# Patient Record
Sex: MALE | Race: WHITE | NOT HISPANIC OR LATINO | Employment: OTHER | URBAN - METROPOLITAN AREA
[De-identification: names, ages, dates, MRNs, and addresses within clinical notes are randomized per-mention and may not be internally consistent; named-entity substitution may affect disease eponyms.]

---

## 2018-03-09 ENCOUNTER — OFFICE VISIT CONVERTED (OUTPATIENT)
Dept: PULMONOLOGY | Facility: CLINIC | Age: 63
End: 2018-03-09
Attending: INTERNAL MEDICINE

## 2018-04-27 ENCOUNTER — OFFICE VISIT CONVERTED (OUTPATIENT)
Dept: CARDIOLOGY | Facility: CLINIC | Age: 63
End: 2018-04-27
Attending: INTERNAL MEDICINE

## 2018-09-18 ENCOUNTER — OFFICE VISIT CONVERTED (OUTPATIENT)
Dept: PULMONOLOGY | Facility: CLINIC | Age: 63
End: 2018-09-18
Attending: INTERNAL MEDICINE

## 2018-11-02 ENCOUNTER — OFFICE VISIT CONVERTED (OUTPATIENT)
Dept: CARDIOLOGY | Facility: CLINIC | Age: 63
End: 2018-11-02
Attending: INTERNAL MEDICINE

## 2019-03-18 ENCOUNTER — OFFICE VISIT CONVERTED (OUTPATIENT)
Dept: PULMONOLOGY | Facility: CLINIC | Age: 64
End: 2019-03-18
Attending: PHYSICIAN ASSISTANT

## 2019-05-14 ENCOUNTER — CONVERSION ENCOUNTER (OUTPATIENT)
Dept: CARDIOLOGY | Facility: CLINIC | Age: 64
End: 2019-05-14

## 2019-05-14 ENCOUNTER — OFFICE VISIT CONVERTED (OUTPATIENT)
Dept: CARDIOLOGY | Facility: CLINIC | Age: 64
End: 2019-05-14
Attending: INTERNAL MEDICINE

## 2019-09-16 ENCOUNTER — HOSPITAL ENCOUNTER (OUTPATIENT)
Dept: GENERAL RADIOLOGY | Facility: HOSPITAL | Age: 64
Discharge: HOME | End: 2019-09-16
Attending: PHYSICIAN ASSISTANT

## 2019-09-24 ENCOUNTER — OFFICE VISIT CONVERTED (OUTPATIENT)
Dept: PULMONOLOGY | Facility: CLINIC | Age: 64
End: 2019-09-24
Attending: PHYSICIAN ASSISTANT

## 2019-11-14 ENCOUNTER — HOSPITAL ENCOUNTER (OUTPATIENT)
Dept: SLEEP MEDICINE | Facility: HOSPITAL | Age: 64
Discharge: HOME | End: 2019-11-14
Attending: INTERNAL MEDICINE

## 2019-11-21 ENCOUNTER — OFFICE VISIT CONVERTED (OUTPATIENT)
Dept: CARDIOLOGY | Facility: CLINIC | Age: 64
End: 2019-11-21
Attending: INTERNAL MEDICINE

## 2019-12-05 ENCOUNTER — HOSPITAL ENCOUNTER (OUTPATIENT)
Dept: FAMILY MEDICINE CLINIC | Facility: CLINIC | Age: 64
Discharge: HOME | End: 2019-12-05
Attending: NURSE PRACTITIONER

## 2019-12-05 ENCOUNTER — OFFICE VISIT CONVERTED (OUTPATIENT)
Dept: FAMILY MEDICINE CLINIC | Facility: CLINIC | Age: 64
End: 2019-12-05
Attending: NURSE PRACTITIONER

## 2019-12-05 LAB
ALBUMIN SERPL-MCNC: 3.7 G/DL (ref 3.5–5)
ALBUMIN/GLOB SERPL: 0.8 {RATIO} (ref 1.4–2.6)
ALP SERPL-CCNC: 96 U/L (ref 56–155)
ALT SERPL-CCNC: 12 U/L (ref 10–40)
ANION GAP SERPL CALC-SCNC: 29 MMOL/L (ref 8–19)
AST SERPL-CCNC: 22 U/L (ref 15–50)
BASOPHILS # BLD AUTO: 0.09 10*3/UL (ref 0–0.2)
BASOPHILS NFR BLD AUTO: 0.8 % (ref 0–3)
BILIRUB SERPL-MCNC: 1.36 MG/DL (ref 0.2–1.3)
BUN SERPL-MCNC: 15 MG/DL (ref 5–25)
BUN/CREAT SERPL: 12 {RATIO} (ref 6–20)
CALCIUM SERPL-MCNC: 10.1 MG/DL (ref 8.7–10.4)
CHLORIDE SERPL-SCNC: 89 MMOL/L (ref 99–111)
CHOLEST SERPL-MCNC: 103 MG/DL (ref 107–200)
CHOLEST/HDLC SERPL: 3.8 {RATIO} (ref 3–6)
CONV ABS IMM GRAN: 0.05 10*3/UL (ref 0–0.2)
CONV CO2: 17 MMOL/L (ref 22–32)
CONV IMMATURE GRAN: 0.4 % (ref 0–1.8)
CONV TOTAL PROTEIN: 8.1 G/DL (ref 6.3–8.2)
CREAT UR-MCNC: 1.21 MG/DL (ref 0.7–1.2)
DEPRECATED RDW RBC AUTO: 48.1 FL (ref 35.1–43.9)
EOSINOPHIL # BLD AUTO: 0.06 10*3/UL (ref 0–0.7)
EOSINOPHIL # BLD AUTO: 0.5 % (ref 0–7)
ERYTHROCYTE [DISTWIDTH] IN BLOOD BY AUTOMATED COUNT: 13 % (ref 11.6–14.4)
GFR SERPLBLD BASED ON 1.73 SQ M-ARVRAT: >60 ML/MIN/{1.73_M2}
GLOBULIN UR ELPH-MCNC: 4.4 G/DL (ref 2–3.5)
GLUCOSE SERPL-MCNC: 114 MG/DL (ref 70–99)
HCT VFR BLD AUTO: 54.6 % (ref 42–52)
HDLC SERPL-MCNC: 27 MG/DL (ref 40–60)
HGB BLD-MCNC: 17.8 G/DL (ref 14–18)
LDLC SERPL CALC-MCNC: 55 MG/DL (ref 70–100)
LYMPHOCYTES # BLD AUTO: 1.39 10*3/UL (ref 1–5)
LYMPHOCYTES NFR BLD AUTO: 11.8 % (ref 20–45)
MCH RBC QN AUTO: 32.5 PG (ref 27–31)
MCHC RBC AUTO-ENTMCNC: 32.6 G/DL (ref 33–37)
MCV RBC AUTO: 99.6 FL (ref 80–96)
MONOCYTES # BLD AUTO: 0.43 10*3/UL (ref 0.2–1.2)
MONOCYTES NFR BLD AUTO: 3.6 % (ref 3–10)
NEUTROPHILS # BLD AUTO: 9.77 10*3/UL (ref 2–8)
NEUTROPHILS NFR BLD AUTO: 82.9 % (ref 30–85)
NRBC CBCN: 0 % (ref 0–0.7)
OSMOLALITY SERPL CALC.SUM OF ELEC: 272 MOSM/KG (ref 273–304)
PLATELET # BLD AUTO: 329 10*3/UL (ref 130–400)
PMV BLD AUTO: 10.7 FL (ref 9.4–12.4)
POTASSIUM SERPL-SCNC: 4.7 MMOL/L (ref 3.5–5.3)
PSA SERPL-MCNC: 0.5 NG/ML (ref 0–4)
RBC # BLD AUTO: 5.48 10*6/UL (ref 4.7–6.1)
SODIUM SERPL-SCNC: 130 MMOL/L (ref 135–147)
TRIGL SERPL-MCNC: 105 MG/DL (ref 40–150)
TSH SERPL-ACNC: 2.84 M[IU]/L (ref 0.27–4.2)
VLDLC SERPL-MCNC: 21 MG/DL (ref 5–37)
WBC # BLD AUTO: 11.79 10*3/UL (ref 4.8–10.8)

## 2020-01-08 ENCOUNTER — HOSPITAL ENCOUNTER (OUTPATIENT)
Dept: FAMILY MEDICINE CLINIC | Facility: CLINIC | Age: 65
Discharge: HOME | End: 2020-01-08
Attending: NURSE PRACTITIONER

## 2020-01-08 LAB
ALBUMIN SERPL-MCNC: 3.8 G/DL (ref 3.5–5)
ALBUMIN/GLOB SERPL: 0.9 {RATIO} (ref 1.4–2.6)
ALP SERPL-CCNC: 121 U/L (ref 56–155)
ALT SERPL-CCNC: 22 U/L (ref 10–40)
ANION GAP SERPL CALC-SCNC: 17 MMOL/L (ref 8–19)
AST SERPL-CCNC: 30 U/L (ref 15–50)
BASOPHILS # BLD AUTO: 0.08 10*3/UL (ref 0–0.2)
BASOPHILS NFR BLD AUTO: 0.9 % (ref 0–3)
BILIRUB SERPL-MCNC: 0.62 MG/DL (ref 0.2–1.3)
BUN SERPL-MCNC: 6 MG/DL (ref 5–25)
BUN/CREAT SERPL: 7 {RATIO} (ref 6–20)
CALCIUM SERPL-MCNC: 9.4 MG/DL (ref 8.7–10.4)
CHLORIDE SERPL-SCNC: 93 MMOL/L (ref 99–111)
CONV ABS IMM GRAN: 0.05 10*3/UL (ref 0–0.2)
CONV CO2: 27 MMOL/L (ref 22–32)
CONV IMMATURE GRAN: 0.5 % (ref 0–1.8)
CONV TOTAL PROTEIN: 7.9 G/DL (ref 6.3–8.2)
CREAT UR-MCNC: 0.9 MG/DL (ref 0.7–1.2)
DEPRECATED RDW RBC AUTO: 48.1 FL (ref 35.1–43.9)
EOSINOPHIL # BLD AUTO: 0.15 10*3/UL (ref 0–0.7)
EOSINOPHIL # BLD AUTO: 1.6 % (ref 0–7)
ERYTHROCYTE [DISTWIDTH] IN BLOOD BY AUTOMATED COUNT: 13.2 % (ref 11.6–14.4)
GFR SERPLBLD BASED ON 1.73 SQ M-ARVRAT: >60 ML/MIN/{1.73_M2}
GLOBULIN UR ELPH-MCNC: 4.1 G/DL (ref 2–3.5)
GLUCOSE SERPL-MCNC: 115 MG/DL (ref 70–99)
HCT VFR BLD AUTO: 52.5 % (ref 42–52)
HGB BLD-MCNC: 17.6 G/DL (ref 14–18)
LYMPHOCYTES # BLD AUTO: 2.39 10*3/UL (ref 1–5)
LYMPHOCYTES NFR BLD AUTO: 25.4 % (ref 20–45)
MCH RBC QN AUTO: 32.7 PG (ref 27–31)
MCHC RBC AUTO-ENTMCNC: 33.5 G/DL (ref 33–37)
MCV RBC AUTO: 97.4 FL (ref 80–96)
MONOCYTES # BLD AUTO: 0.32 10*3/UL (ref 0.2–1.2)
MONOCYTES NFR BLD AUTO: 3.4 % (ref 3–10)
NEUTROPHILS # BLD AUTO: 6.41 10*3/UL (ref 2–8)
NEUTROPHILS NFR BLD AUTO: 68.2 % (ref 30–85)
NRBC CBCN: 0 % (ref 0–0.7)
OSMOLALITY SERPL CALC.SUM OF ELEC: 275 MOSM/KG (ref 273–304)
PLATELET # BLD AUTO: 247 10*3/UL (ref 130–400)
PMV BLD AUTO: 9.7 FL (ref 9.4–12.4)
POTASSIUM SERPL-SCNC: 3.6 MMOL/L (ref 3.5–5.3)
RBC # BLD AUTO: 5.39 10*6/UL (ref 4.7–6.1)
SODIUM SERPL-SCNC: 133 MMOL/L (ref 135–147)
WBC # BLD AUTO: 9.4 10*3/UL (ref 4.8–10.8)

## 2020-06-17 ENCOUNTER — CONVERSION ENCOUNTER (OUTPATIENT)
Dept: CARDIOLOGY | Facility: CLINIC | Age: 65
End: 2020-06-17

## 2020-06-17 ENCOUNTER — OFFICE VISIT CONVERTED (OUTPATIENT)
Dept: CARDIOLOGY | Facility: CLINIC | Age: 65
End: 2020-06-17
Attending: INTERNAL MEDICINE

## 2020-09-16 ENCOUNTER — HOSPITAL ENCOUNTER (OUTPATIENT)
Dept: LAB | Facility: HOSPITAL | Age: 65
Discharge: HOME | End: 2020-09-16
Attending: INTERNAL MEDICINE

## 2020-09-16 ENCOUNTER — HOSPITAL ENCOUNTER (OUTPATIENT)
Dept: GENERAL RADIOLOGY | Facility: HOSPITAL | Age: 65
Discharge: HOME | End: 2020-09-16
Attending: PHYSICIAN ASSISTANT

## 2020-09-16 LAB
ALBUMIN SERPL-MCNC: 3.9 G/DL (ref 3.5–5)
ALBUMIN/GLOB SERPL: 1 {RATIO} (ref 1.4–2.6)
ALP SERPL-CCNC: 99 U/L (ref 56–155)
ALT SERPL-CCNC: 25 U/L (ref 10–40)
ANION GAP SERPL CALC-SCNC: 18 MMOL/L (ref 8–19)
AST SERPL-CCNC: 24 U/L (ref 15–50)
BILIRUB SERPL-MCNC: 0.94 MG/DL (ref 0.2–1.3)
BUN SERPL-MCNC: 11 MG/DL (ref 5–25)
BUN/CREAT SERPL: 10 {RATIO} (ref 6–20)
CALCIUM SERPL-MCNC: 9.7 MG/DL (ref 8.7–10.4)
CHLORIDE SERPL-SCNC: 100 MMOL/L (ref 99–111)
CHOLEST SERPL-MCNC: 111 MG/DL (ref 107–200)
CHOLEST/HDLC SERPL: 3.2 {RATIO} (ref 3–6)
CONV CO2: 23 MMOL/L (ref 22–32)
CONV TOTAL PROTEIN: 7.7 G/DL (ref 6.3–8.2)
CREAT UR-MCNC: 1.09 MG/DL (ref 0.7–1.2)
GFR SERPLBLD BASED ON 1.73 SQ M-ARVRAT: >60 ML/MIN/{1.73_M2}
GLOBULIN UR ELPH-MCNC: 3.8 G/DL (ref 2–3.5)
GLUCOSE SERPL-MCNC: 113 MG/DL (ref 70–99)
HDLC SERPL-MCNC: 35 MG/DL (ref 40–60)
LDLC SERPL CALC-MCNC: 60 MG/DL (ref 70–100)
OSMOLALITY SERPL CALC.SUM OF ELEC: 284 MOSM/KG (ref 273–304)
POTASSIUM SERPL-SCNC: 3.9 MMOL/L (ref 3.5–5.3)
SODIUM SERPL-SCNC: 137 MMOL/L (ref 135–147)
TRIGL SERPL-MCNC: 82 MG/DL (ref 40–150)
VLDLC SERPL-MCNC: 16 MG/DL (ref 5–37)

## 2020-09-23 ENCOUNTER — OFFICE VISIT CONVERTED (OUTPATIENT)
Dept: PULMONOLOGY | Facility: CLINIC | Age: 65
End: 2020-09-23
Attending: NURSE PRACTITIONER

## 2020-11-12 ENCOUNTER — HOSPITAL ENCOUNTER (OUTPATIENT)
Dept: SLEEP MEDICINE | Facility: HOSPITAL | Age: 65
Discharge: HOME | End: 2020-11-12
Attending: INTERNAL MEDICINE

## 2021-04-02 ENCOUNTER — OFFICE VISIT CONVERTED (OUTPATIENT)
Dept: PULMONOLOGY | Facility: CLINIC | Age: 66
End: 2021-04-02
Attending: NURSE PRACTITIONER

## 2021-04-06 ENCOUNTER — OFFICE VISIT CONVERTED (OUTPATIENT)
Dept: FAMILY MEDICINE CLINIC | Facility: CLINIC | Age: 66
End: 2021-04-06
Attending: NURSE PRACTITIONER

## 2021-04-06 ENCOUNTER — HOSPITAL ENCOUNTER (OUTPATIENT)
Dept: FAMILY MEDICINE CLINIC | Facility: CLINIC | Age: 66
Discharge: HOME | End: 2021-04-06
Attending: NURSE PRACTITIONER

## 2021-04-06 LAB
ALBUMIN SERPL-MCNC: 4.2 G/DL (ref 3.5–5)
ALBUMIN/GLOB SERPL: 1 {RATIO} (ref 1.4–2.6)
ALP SERPL-CCNC: 119 U/L (ref 56–155)
ALT SERPL-CCNC: 23 U/L (ref 10–40)
ANION GAP SERPL CALC-SCNC: 25 MMOL/L (ref 8–19)
AST SERPL-CCNC: 21 U/L (ref 15–50)
BASOPHILS # BLD AUTO: 0.1 10*3/UL (ref 0–0.2)
BASOPHILS NFR BLD AUTO: 1.1 % (ref 0–3)
BILIRUB SERPL-MCNC: 0.68 MG/DL (ref 0.2–1.3)
BUN SERPL-MCNC: 13 MG/DL (ref 5–25)
BUN/CREAT SERPL: 11 {RATIO} (ref 6–20)
CALCIUM SERPL-MCNC: 9.7 MG/DL (ref 8.7–10.4)
CHLORIDE SERPL-SCNC: 99 MMOL/L (ref 99–111)
CHOLEST SERPL-MCNC: 121 MG/DL (ref 107–200)
CHOLEST/HDLC SERPL: 2.5 {RATIO} (ref 3–6)
CONV ABS IMM GRAN: 0.03 10*3/UL (ref 0–0.2)
CONV CO2: 21 MMOL/L (ref 22–32)
CONV IMMATURE GRAN: 0.3 % (ref 0–1.8)
CONV TOTAL PROTEIN: 8.6 G/DL (ref 6.3–8.2)
CREAT UR-MCNC: 1.16 MG/DL (ref 0.7–1.2)
DEPRECATED RDW RBC AUTO: 48.3 FL (ref 35.1–43.9)
EOSINOPHIL # BLD AUTO: 0.29 10*3/UL (ref 0–0.7)
EOSINOPHIL # BLD AUTO: 3.3 % (ref 0–7)
ERYTHROCYTE [DISTWIDTH] IN BLOOD BY AUTOMATED COUNT: 13.2 % (ref 11.6–14.4)
GFR SERPLBLD BASED ON 1.73 SQ M-ARVRAT: >60 ML/MIN/{1.73_M2}
GLOBULIN UR ELPH-MCNC: 4.4 G/DL (ref 2–3.5)
GLUCOSE SERPL-MCNC: 99 MG/DL (ref 70–99)
HCT VFR BLD AUTO: 61.4 % (ref 42–52)
HDLC SERPL-MCNC: 49 MG/DL (ref 40–60)
HGB BLD-MCNC: 19.8 G/DL (ref 14–18)
LDLC SERPL CALC-MCNC: 60 MG/DL (ref 70–100)
LYMPHOCYTES # BLD AUTO: 3.03 10*3/UL (ref 1–5)
LYMPHOCYTES NFR BLD AUTO: 34.4 % (ref 20–45)
MCH RBC QN AUTO: 31.4 PG (ref 27–31)
MCHC RBC AUTO-ENTMCNC: 32.2 G/DL (ref 33–37)
MCV RBC AUTO: 97.5 FL (ref 80–96)
MONOCYTES # BLD AUTO: 0.5 10*3/UL (ref 0.2–1.2)
MONOCYTES NFR BLD AUTO: 5.7 % (ref 3–10)
NEUTROPHILS # BLD AUTO: 4.85 10*3/UL (ref 2–8)
NEUTROPHILS NFR BLD AUTO: 55.2 % (ref 30–85)
NRBC CBCN: 0 % (ref 0–0.7)
OSMOLALITY SERPL CALC.SUM OF ELEC: 292 MOSM/KG (ref 273–304)
PLATELET # BLD AUTO: 222 10*3/UL (ref 130–400)
PMV BLD AUTO: 10.8 FL (ref 9.4–12.4)
POTASSIUM SERPL-SCNC: 4.1 MMOL/L (ref 3.5–5.3)
PSA SERPL-MCNC: 0.76 NG/ML (ref 0–4)
RBC # BLD AUTO: 6.3 10*6/UL (ref 4.7–6.1)
SODIUM SERPL-SCNC: 141 MMOL/L (ref 135–147)
TRIGL SERPL-MCNC: 60 MG/DL (ref 40–150)
TSH SERPL-ACNC: 1.82 M[IU]/L (ref 0.27–4.2)
VLDLC SERPL-MCNC: 12 MG/DL (ref 5–37)
WBC # BLD AUTO: 8.8 10*3/UL (ref 4.8–10.8)

## 2021-04-11 LAB — LEVETIRACETAM SERPL-MCNC: 21 UG/ML (ref 10–40)

## 2021-04-23 ENCOUNTER — HOSPITAL ENCOUNTER (OUTPATIENT)
Dept: LAB | Facility: HOSPITAL | Age: 66
Discharge: HOME | End: 2021-04-23
Attending: NURSE PRACTITIONER

## 2021-04-28 LAB — CONV HEMOCHROMATOSIS MUTATION (C282Y,H63D,565C): NORMAL

## 2021-05-06 ENCOUNTER — OFFICE VISIT CONVERTED (OUTPATIENT)
Dept: CARDIOLOGY | Facility: CLINIC | Age: 66
End: 2021-05-06
Attending: INTERNAL MEDICINE

## 2021-05-13 NOTE — PROGRESS NOTES
Progress Note      Patient Name: Sacha Arzola   Patient ID: [id number]   Sex: Male   YOB: 1955    Primary Care Provider: Ifeoma CLEARY   Referring Provider: Ifeoma CLEARY    Visit Date: June 17, 2020    Provider: Kalin Ch MD   Location: [facility]   Location Address: [address] [place] [address], [facility]   [place], [place]  [id number]   Location Phone: [phone]          Chief Complaint     Atrial fibrillation.       History Of Present Illness  REFERRING CARE PROVIDER: Ifeoma CLEARY   Sacha Arzola is a 65 year old /White male with a history of known chronic atrial fibrillation, diastolic CHF, COPD, hypertension, and pulmonary artery aneurysm who has been doing well since his last visit. His weight is down another 15 pounds. He reports stable shortness of breath and wheezing at times. Patient does continue to actively smoke.   PAST MEDICAL HISTORY: COPD; CVA; Chronic atrial fibrillation; Congestive heart failure, diastolic; Hyperlipidemia; Hypertension; Pulmonary artery aneurysm; Seizure disorder.   FAMILY HISTORY: Positive for diabetes mellitus and hypertension. Negative for heart disease.   PSYCHOSOCIAL HISTORY: Current smoker of one pack 1.5 packs per day. Moderate alcohol consumption. Denies mood changes or depression.   CURRENT MEDICATIONS: Aspirin 81 mg daily; metoprolol succinate  mg daily; Eliquis 5 mg b.i.d.; Omega-3 b.i.d.; triamterene-hydrochlorothiazide 37.5-25 mg 1/2 daily; potassium chloride 10 mEq 2 tablets daily; levetiracetam 500 mg 2 b.i.d.; lisinopril 5 mg daily; simvastatin 10 mg daily; furosemide 40 mg daily; Ventolin HFA; Anoro. Dosage and frequency of the medication(s) reviewed with the patient.       Review of Systems  · Cardiovascular  o Denies  o : palpitations (fast, fluttering, or skipping beats), swelling (feet, ankles, hands), shortness of breath while walking or lying flat, chest pain  "or angina pectoris   · Respiratory  o Admits  o : asthma or wheezing  o Denies  o : chronic or frequent cough      Vitals  Date Time BP Position Site L\R Cuff Size HR RR TEMP (F) WT  HT  BMI kg/m2 BSA m2 O2 Sat HC       06/17/2020 09:04 /94 Sitting    66 - R   232lbs 16oz 5'  10\" 33.43 2.28     06/17/2020 09:04 /74 Sitting    78 - R                 Physical Examination  · Constitutional  o Appearance  o : Awake, alert, in no acute distress.   · Eyes  o Conjunctivae  o : Normal.  · Ears, Nose, Mouth and Throat  o Oral Cavity  o :   § Oral Mucosa  § : Normal.  · Neck  o Inspection/Palpation  o : No JVD. Good carotid upstroke. No thyromegaly.  · Respiratory  o Respiratory  o : Distant breath sounds bilaterally.  · Cardiovascular  o Heart  o :   § Auscultation of Heart  § : Irregularly irregular.  o Peripheral Vascular System  o :   § Extremities  § : Good femoral and pedal pulses. No pedal edema.  · Gastrointestinal  o Abdominal Examination  o : Soft. No tenderness or masses felt. No hepatosplenomegaly. Abdominal aorta is not palpable.          Assessment     ASSESSMENT & PLAN:    1.  Atrial fibrillation, chronic, rate controlled.  Patient is on Eliquis for CVA prevention.  We will plan on        repeating a renal panel.    2.  Chronic obstructive pulmonary disease.  3.  Hypertension, controlled.  4.  History of seizure disorder.  5.  Diastolic congestive heart failure.  6.  CVA.  7.  Dyslipidemia.             Electronically Signed by: Eneida Pierre-, Other -Author on June 23, 2020 07:31:38 AM  Electronically Co-signed by: Kalin Ch MD -Reviewer on July 1, 2020 08:49:04 AM  "

## 2021-05-14 VITALS
RESPIRATION RATE: 18 BRPM | BODY MASS INDEX: 31.95 KG/M2 | TEMPERATURE: 96.7 F | SYSTOLIC BLOOD PRESSURE: 111 MMHG | HEIGHT: 70 IN | HEART RATE: 83 BPM | DIASTOLIC BLOOD PRESSURE: 79 MMHG | OXYGEN SATURATION: 97 % | WEIGHT: 223.19 LBS

## 2021-05-14 NOTE — PROGRESS NOTES
"   Progress Note      Patient Name: Sacha Arzola   Patient ID: [id number]   Sex: Male   YOB: 1955    Primary Care Provider: No PCP No PCP Other   Referring Provider: Ifeoma CLEARY    Visit Date: April 6, 2021    Provider: VEIN Florez   Location: [facility]   Location Address: [address] [place] [address]  [id number]   Location Phone: [phone]          Chief Complaint  · COPD  · CHF  · HTN  · Hyperlipidemia      History Of Present Illness  Sacha Arzola is a 66 year old /White male who presents for evaluation and treatment of:        He hasn't been in over a year, [facility] He had a stroke and has been on this from neurology ever since.    chronic afib with cva in 2010- he takes eliquis and aspirin, no longer sees neurology, he had lost peripheral vision on the right  side in both eyes.     HTN- on triamterine/hctz, lisinopril and metoprolol-  managed by cardio, he has an appt this month wiht dr Ch. Denies CP/SOA/HA    HLP- on simvastatin, no problems, he is due for labs.     COPD- he says doing well, ventolin and \"something wiht a red top.\" He just saw Dr Mccormick friday.    Nicotine dependence, smoking 1.5 ppd, no interest in quitting    CHF, denies peripheral edema or shortness of breath    he states he had pneumonia vaccines  Declines covid vaccine  refuses colonoscpy but willing to do cologard.  [facility]    Denies urinary symptoms       Past Medical History  Disease Name Date Onset Notes   CHF (congestive heart failure) 12/05/2019 --    COPD (chronic obstructive pulmonary disease) 12/05/2019 --    CVA (cerebral vascular accident) 12/05/2019 --    Essential hypertension 12/05/2019 --    Fatigue 12/05/2019 --    Hyperlipidemia 12/05/2019 --    Peripheral vision loss 12/05/2019 --    Seizures --  --          Medication List  Name Date " Started Instructions   aspirin 81 mg oral tablet,delayed release (DR/EC) 03/02/2021 TAKE ONE TABLET BY MOUTH ONCE DAILY   Eliquis 5 mg oral tablet 02/09/2021 TAKE ONE TABLET BY MOUTH TWICE DAILY   furosemide 40 mg oral tablet  take 1 tablet (40 mg) by oral route once daily   levetiracetam 500 mg oral tablet 04/06/2021 TAKE TWO TABLETS BY MOUTH TWICE DAILY   lisinopril 5 mg oral tablet 03/02/2021 TAKE ONE TABLET BY MOUTH ONCE DAILY   metoprolol succinate 200 mg oral tablet extended release 24 hr 03/02/2021 TAKE ONE TABLET BY MOUTH ONCE DAILY   omega-3 acid ethyl esters 1 gram oral capsule 03/02/2021 TAKE ONE CAPSULE BY MOUTH TWICE DAILY   potassium chloride 10 mEq oral capsule, extended release 10/29/2018 TAKE TWO CAPSULES BY MOUTH EVERY DAY   potassium chloride 10 mEq oral tablet extended release 07/22/2020 TAKE TWO TABLETS BY MOUTH EVERY DAY   Replaced/[employer] 10 mEq oral tablet extended release 01/29/2021 TAKE TWO TABLETS BY MOUTH EVERY DAY   simvastatin 10 mg oral tablet 03/02/2021 TAKE ONE TABLET BY MOUTH ONCE DAILY AT BEDTIME   triamterene-hydrochlorothiazid 37.5-25 mg oral tablet 03/02/2021 TAKE 1/2 TABLET BY MOUTH DAILY   Ventolin HFA 90 mcg/actuation inhalation HFA aerosol inhaler  inhale 1 puff (90 mcg) by inhalation route every 6 hours as needed         Allergy List  Allergen Name Date Reaction Notes   NO KNOWN DRUG ALLERGIES --  --  --          Family Medical History  Disease Name Relative/Age Notes   *No Known Family History  --          Social History  Finding Status Start/Stop Quantity Notes   Tobacco Current every day --/-- 1.5 ppd --          Immunizations  NameDate Admin Mfg Trade Name Lot Number Route Inj VIS Given VIS Publication   InfluenzaRefused 12/05/2019 NE Not Entered  NE NE     Comments:          Review of Systems  · Constitutional  o Denies  o : chills, excessive sweating, fatigue, fever, sycope/passing out, weight gain, weight loss  · Eyes  o Admits  o : vision problems but  "unchanged.  · HENT  o Denies  o : loss of hearing, ringing in the ears, ear aches, sore throat, nasal congestion, sinus pain, nose bleeds, seasonal allergies  · Cardiovascular  o Denies  o : chest pain, palpitations, peripheral edema  · Respiratory  o Denies  o : shortness of breath, wheezing  · Gastrointestinal  o Denies  o : difficulty swallowing, reflux  · Genitourinary  o Denies  o : incontinence, nocturia  · Neurologic  o Denies  o : stroke, falls, confusion  · Musculoskeletal  o Denies  o : joint pain  · Psychiatric  o Denies  o : anxiety, depression      Vitals  Date Time BP Position Site L\R Cuff Size HR RR TEMP (F) WT  HT  BMI kg/m2 BSA m2 O2 Sat FR L/min FiO2 HC       04/06/2021 08:20 /79 Sitting    83 - R 18 96.7 223lbs 3oz 5'  10\" 32.02 2.24 97 %            Physical Examination  · Constitutional  o Appearance  o : well developed, well-nourished, no acute distress  · Ears, Nose, Mouth and Throat  o Ears  o :   § External Ears  § : external auditory canal appearance normal, no discharge present  § Otoscopic Examination  § : tympanic membranes pearly white/gray bilaterally  o Nose  o :   § External Nose  § : no lesions noted  § Intranasal Exam  § : nasal mucosa light pink, no intranasal lesions present, nares patent  § Nasopharynx  § : no discharge present  o Oral Cavity  o :   § Oral Mucosa  § : oral mucosa light pink  § Teeth  § : poor dentition   o Throat  o :   § Oropharynx  § : tonsils without exudate, no palatal petechiae  · Neck  o Inspection/Palpation  o : normal appearance, no masses or tenderness, trachea midline  o Thyroid  o : gland size normal, nontender, no nodules or masses present on palpation  · Respiratory  o Respiratory Effort  o : breathing unlabored  o Inspection of Chest  o : chest rise symmetric bilaterally  o Auscultation of Lungs  o : clear to auscultation bilaterally throughout inspiration and expiration  · Cardiovascular  o Heart  o :   § Auscultation of Heart  § : regular " "rate and rhythm, no murmurs, gallops or rubs  o Peripheral Vascular System  o :   § Extremities  § : no edema  · Lymphatic  o Neck  o : no cervical lymphadenopathy, no supraclavicular lymphadenopathy  · Psychiatric  o Mood and Affect  o : mood normal, affect appropriate              Assessment  · Annual physical exam     V70.0/Z00.00  · CHF (congestive heart failure)     428.0/I50.9  · COPD (chronic obstructive pulmonary disease)     496/J44.9  · Hyperlipidemia     272.4/E78.5  · Nicotine dependence     305.1/F17.200  · Screening for depression     V79.0/Z13.89  · Screening for colon cancer     V76.51/Z12.11  · Screening for prostate cancer     V76.44/Z12.5  · CVA (cerebral vascular accident)     434.91/I63.9  · Peripheral vision loss     368.44/H53.459  · Chronic atrial fibrillation     427.31/I48.20  · Medication monitoring encounter     V58.83/Z51.81      Plan  · Orders  o ACO-17: Screened for tobacco use AND received tobacco cessation intervention (4004F) - 305.1/F17.200 - 04/06/2021  o ACO-18: Negative screen for clinical depression using a standardized tool () - V79.0/Z13.89 - 04/08/2021  o Male Physical Primary Care Panel (CMP, CBC, TSH, Lipid, PSA) [facility] (14457, 67025, 67233, 36463, 25803, ) - - 04/06/2021  o ACO-39: Current medications updated and reviewed () - - 04/06/2021  o Levetiracetam ser/plas (25299) - - 04/06/2021  o Cologuard (87510) - - 04/06/2021  o ACO - Advance Care Plan or Surrogate Decision Maker documented in EMR (1123F) - - 04/06/2021   pt reports on file at the hospital  o ACO-13: Fall Risk Screening with no falls in past year or only one fall without injury in the past year (1101F) - - 04/06/2021   \"no falls\"  · Medications  o levetiracetam 500 mg oral tablet   SIG: TAKE TWO TABLETS BY MOUTH TWICE DAILY   DISP: (120) Tablet with 5 refills  Refilled on 04/06/2021     o Aspir-Low 81 mg oral tablet,delayed release (DR/EC)   SIG: TAKE ONE TABLET BY MOUTH ONCE DAILY   DISP: (90) " Tablet with -1 refills  Discontinued on 04/06/2021     o ASPIRIN EC 81 MG TABLET    SIG: TAKE ONE TABLET BY MOUTH ONCE DAILY   DISP: (90) Each with -1 refills  Discontinued on 04/06/2021     o Augmentin 875-125 mg oral tablet   SIG: take 1 tablet by oral route every 12 hours for 10 days   DISP: (20) tablets with 0 refills  Discontinued on 04/06/2021     o Medications have been Reconciled  o Transition of Care or Provider Policy  · Instructions  o Reviewed health maintenance flowsheet and updated information. Orders were placed and/or patient's response was documented.  o *Form of nicotine being used: cigarettes  o Patient was strongly encouraged to discontinue use of any nicotine containing product or minimize the use of the product.  o Depression Screen completed and scanned into the EMR under the designated folder within the patient's documents.  o Today's PHQ-9 result is 0__  o Patient is taking medications as prescribed and doing well.   o Patient was educated/instructed on their diagnosis, treatment and medications prior to discharge from the clinic today.  o Patient instructed to seek medical attention urgently for new or worsening symptoms.  o Call the office with any concerns or questions.  o Minutes spent with patient including greater than 50% in Education/Counseling/Care Coordination.  o Time spent with the patient was minutes, more than 50% face to face.  o Chronic conditions reviewed and taken into consideration for today's treatment plan.  o Flu vaccine declined.  o No adjustments were made to meds at this visit, will await labs to make sure levetiracetam level is appropriate. Cardiology to manage heart and cholesterol meds. Pulmonology to continue management of COPD meds. We will arrange for colon screening through [facility].  · Disposition  o Call or Return if symptoms worsen or persist.  o F/u appt in 6 months            Electronically Signed by: EVIN Florez -Author on April 8,  2021 08:29:27 AM

## 2021-05-15 VITALS
HEART RATE: 114 BPM | DIASTOLIC BLOOD PRESSURE: 47 MMHG | SYSTOLIC BLOOD PRESSURE: 93 MMHG | OXYGEN SATURATION: 98 % | BODY MASS INDEX: 34.26 KG/M2 | RESPIRATION RATE: 16 BRPM | WEIGHT: 239.31 LBS | HEIGHT: 70 IN

## 2021-05-15 VITALS
HEART RATE: 62 BPM | HEIGHT: 70 IN | DIASTOLIC BLOOD PRESSURE: 70 MMHG | BODY MASS INDEX: 35.5 KG/M2 | WEIGHT: 248 LBS | SYSTOLIC BLOOD PRESSURE: 94 MMHG

## 2021-05-15 VITALS
BODY MASS INDEX: 33.36 KG/M2 | SYSTOLIC BLOOD PRESSURE: 118 MMHG | HEIGHT: 70 IN | HEART RATE: 66 BPM | WEIGHT: 233 LBS | DIASTOLIC BLOOD PRESSURE: 94 MMHG

## 2021-05-15 VITALS
HEART RATE: 82 BPM | BODY MASS INDEX: 37.94 KG/M2 | SYSTOLIC BLOOD PRESSURE: 112 MMHG | WEIGHT: 265 LBS | HEIGHT: 70 IN | DIASTOLIC BLOOD PRESSURE: 86 MMHG

## 2021-05-16 VITALS
WEIGHT: 265 LBS | HEIGHT: 70 IN | SYSTOLIC BLOOD PRESSURE: 100 MMHG | BODY MASS INDEX: 37.94 KG/M2 | DIASTOLIC BLOOD PRESSURE: 70 MMHG | HEART RATE: 82 BPM

## 2021-05-16 VITALS
HEIGHT: 70 IN | HEART RATE: 78 BPM | WEIGHT: 273 LBS | DIASTOLIC BLOOD PRESSURE: 90 MMHG | BODY MASS INDEX: 39.08 KG/M2 | SYSTOLIC BLOOD PRESSURE: 122 MMHG

## 2021-05-23 ENCOUNTER — TRANSCRIBE ORDERS (OUTPATIENT)
Dept: ADMINISTRATIVE | Facility: HOSPITAL | Age: 66
End: 2021-05-23

## 2021-05-23 DIAGNOSIS — R91.1 PULMONARY NODULE: Primary | ICD-10-CM

## 2021-05-28 VITALS
RESPIRATION RATE: 20 BRPM | WEIGHT: 258.19 LBS | TEMPERATURE: 97.7 F | DIASTOLIC BLOOD PRESSURE: 88 MMHG | BODY MASS INDEX: 36.96 KG/M2 | BODY MASS INDEX: 38.44 KG/M2 | SYSTOLIC BLOOD PRESSURE: 127 MMHG | HEIGHT: 70 IN | HEIGHT: 70 IN | WEIGHT: 268.5 LBS | OXYGEN SATURATION: 99 % | HEART RATE: 62 BPM | OXYGEN SATURATION: 95 % | SYSTOLIC BLOOD PRESSURE: 122 MMHG | DIASTOLIC BLOOD PRESSURE: 95 MMHG | HEART RATE: 76 BPM | RESPIRATION RATE: 16 BRPM | TEMPERATURE: 98.3 F

## 2021-05-28 VITALS
SYSTOLIC BLOOD PRESSURE: 119 MMHG | HEIGHT: 70 IN | RESPIRATION RATE: 15 BRPM | HEART RATE: 71 BPM | WEIGHT: 221 LBS | HEIGHT: 70 IN | OXYGEN SATURATION: 98 % | WEIGHT: 226 LBS | TEMPERATURE: 97.8 F | DIASTOLIC BLOOD PRESSURE: 82 MMHG | DIASTOLIC BLOOD PRESSURE: 56 MMHG | SYSTOLIC BLOOD PRESSURE: 106 MMHG | BODY MASS INDEX: 32.35 KG/M2 | RESPIRATION RATE: 16 BRPM | TEMPERATURE: 97.7 F | BODY MASS INDEX: 31.64 KG/M2 | OXYGEN SATURATION: 95 % | HEART RATE: 82 BPM

## 2021-05-28 VITALS
WEIGHT: 270 LBS | TEMPERATURE: 98.2 F | HEART RATE: 100 BPM | TEMPERATURE: 98.1 F | HEART RATE: 90 BPM | DIASTOLIC BLOOD PRESSURE: 93 MMHG | BODY MASS INDEX: 38.65 KG/M2 | SYSTOLIC BLOOD PRESSURE: 131 MMHG | OXYGEN SATURATION: 96 % | RESPIRATION RATE: 14 BRPM | OXYGEN SATURATION: 95 % | RESPIRATION RATE: 16 BRPM | DIASTOLIC BLOOD PRESSURE: 70 MMHG | BODY MASS INDEX: 36.22 KG/M2 | HEIGHT: 70 IN | WEIGHT: 253 LBS | SYSTOLIC BLOOD PRESSURE: 100 MMHG | HEIGHT: 70 IN

## 2021-05-28 NOTE — PROGRESS NOTES
Patient: GUERRERO PALMA     Acct: [id number]     Report: #[id number]  UNIT #: [id number]     : 1955    Encounter Date:2021  PRIMARY CARE: RAYMOND LOPEZ [facility]  ***Signed***  --------------------------------------------------------------------------------------------------------------------  Chief Complaint      Encounter Date      2021            Primary Care Provider      RAYMOND LOPEZ [facility]            Patient Complaint      Patient is complaining of      PT here today for F/U, COPD            VITALS      Height 70 in / 177.8 cm      Weight 221 lbs  / 100.46621 kg      BSA 2.18 m2      BMI 31.7 kg/m2      Temperature 97.8 F / 36.56 C - Temporal      Pulse 82      Respirations 16      Blood Pressure 119/82 Sitting, Left Arm      Pulse Oximetry 95%, room air            HPI      The patient is a 66 year old male patient of Dr. Mccormick's with history of     pulmonary aneurysm, lung nodules, chronic obstructive pulmonary disease and     obstructive sleep apnea. The patient presents for follow up today. The patient's    daughter is also present in the office today.             The patient states since his last office visit his breathing is at baseline. The    patient states he will get short of breath that is moderate in severity, worse     with exertion, improved with rest. The patient denies any cough, wheezing,      fever or chills, night sweats, hemoptysis,  purulent sputum production, swollen     glands in head and neck, unintentional weight loss, chest pain or chest     tightness, abdominal pain, nausea or vomiting or diarrhea. The patient denies      any headaches, myalgias, sore throat, changes in sense of taste and smell any     coronavirus or flu like symptoms. The patient denies any leg swelling, orthopnea    or paroxysmal nocturnal dyspnea. The patient states he is smoking about 1.5     packs of cigarettes a day and has no interest in quitting at all. The patient      states he has not had to take any antibiotics or steroids and denies any     hospitalizations since his last office visit. The patient states he is wearing     his CPAP  every night which helps him to sleep. The patient denies any morning     headaches or excessive daytime sleepiness. Upon reviewing the patient's CPAP      compliance report over the last 30 days, the patient's average daily use is 9     hours, 58 minutes and 22 seconds with auto titrating pressures of 12-20 cm of     water and mean pressure of 12.2 cm of water and apnea hypopnea index of 1.6.      The patient states he is able to perform his activities of daily living without     difficulty.             I reviewed the Review of Systems, medical, surgical and family history and agree    with those as entered.      Copies To:   Darek Mccormick      Constitutional:  Denies: Fatigue, Fever, Weight gain, Weight loss, Chills,     Insomnia, Other      Respiratory/Breathing:  Complains of: Shortness of air, Cough; Denies: Wheezing,    Hemoptysis, Pleuritic pain, Other      Endocrine:  Denies: Polydipsia, Polyuria, Heat/cold intolerance, Diabetes, Other      Eyes:  Denies: Blurred vision, Vision Changes, Other      Ears, nose, mouth, throat:  Denies: Congestion, Dysphagia, Hearing Changes, Nose    Bleeding, Nasal Discharge, Throat pain, Tinnitus, Other      Cardiovascular:  Denies: Chest Pain, Exertional dyspnea, Peripheral Edema,     Palpitations, Syncope, Wake up Gasping for air, Orthopnea, Tachycardia, Other      Gastrointestinal:  Denies: Abdominal pain/cramping, Bloody stools, Constipation,    Diarrhea, Melena, Nausea, Vomiting, Other      Genitourinary:  Denies: Dysuria, Urinary frequency, Incontinence, Hematuria,     Urgency, Other      Musculoskeletal:  Denies: Joint Pain, Joint Stiffness, Joint Swelling, Myalgias,    Other      Hematologic/lymphatic:  DENIES: Lymphadenopathy, Bruising, Bleeding tendencies,     Other       Neurologic:  Denies: Headache, Numbness, Weakness, Seizures, Other      Psychiatric:  Denies: Anxiety, Appropriate Effect, Depression, Other      Sleep:  No: Excessive daytime sleep, Morning Headache?, Snoring, Insomnia?, Stop    breathing at sleep?, Other      Integumentary:  Denies: Rash, Dry skin, Skin Warm to Touch, Other            FAMILY/SOCIAL/MEDICAL HX      Stroke - Family Hx:  Mother      Heart - Family Hx:  Mother      Diabetes - Family Hx:  Mother      Social History:  Tobacco Use, Alcohol Use      Smoking status:  Current every day smoker (smokes 1.5 PPD, smoker X50 yrs)      Anticoagulation Therapy:  Yes      Antibiotic Prophylaxis:  No      Medical History:  Yes: Seizures (2009), Heart Attack (disease ), Hemorrhoids    /Rectal Prob (acid reflux), Hiatal Hernia, High Blood Pressure, Stroke; No:     Blood Disease, Chemotherapy/Cancer, Deafness or Ringing Ears, Shortness Of     Breath, Sinus Trouble      Psychiatric History      none            PREVENTION      Hx Influenza Vaccination:  Yes      Date Influenza Vaccine Given:  Sep 1, 2020      Influenza Vaccine Declined:  No      2 or More Falls in Past Year?:  No      Fall Past Year with Injury?:  No      Hx Pneumococcal Vaccination:  Yes      Encouraged to follow-up with:  PCP regarding preventative exams.      Chart initiated by      Thuy Wilson CMA            ALLERGIES/MEDICATIONS      Allergies:        Coded Allergies:             NO KNOWN DRUG ALLERGIES (Verified  Allergy, Mild, 9/24/19)      Medications    Last Reconciled on 4/2/21 09:40 by SHAWNEE HATHAWAY-Albuterol (Ventolin HFA) 18 Gm Hfa.aer.ad      2 PUFFS INH QID for 90 Days, #3 MDI 3 Refills         [facility]         4/2/21       Umeclidinium/Vilanterol 62.5-25 Mcg Inh (Anoro Ellipta 62.5-25 Mcg Inh) 1 Each     Blst.w.dev      1 PUFF INH QDAY for 90 Days, #3 INH 3 Refills         [facility]         4/2/21       Umeclidinium/Vilanterol  62.5-25 Mcg Inh (Anoro Ellipta 62.5-25 Mcg Inh) 1 Each     Blst.w.dev      1 PUFF INH QDAY for 30 Days, #3 INH 3 Refills         Prov: Darek Mccormick         3/19/21       MDI-Albuterol (Ventolin HFA) 8 Gm Hfa.aer.ad      2 PUFFS INH Q4H PRN for SHORTNESS OF BREATH for 30 Days, #1 MDI 4 Refills         Prov: Darek Mccormick         6/26/20       MDI-Albuterol (Ventolin HFA) 18 Gm Hfa.aer.ad      2 PUFFS INH Q4H PRN for SHORTNESS OF BREATH, #1 INH 3 Refills         Prov: Darek Mccormick         1/29/20       Metoprolol Succinate (Metoprolol Succinate) 100 Mg Tab.sr.24h      200 MG PO QDAY, #30 TAB.SR.24H         Reported         2/23/16       Lisinopril* (Lisinopril*) 5 Mg Tablet      5 MG PO QDAY, TAB 0 Refills         Reported         5/1/15       Apixaban (Eliquis) 5 Mg Tab      5 MG PO BID, TAB         Reported         5/1/15       levETIRAcetam (levETIRAcetam) 500 Mg Tablet      1000 MG PO BID, TAB         Reported         5/1/15       Simvastatin (Simvastatin*) 10 Mg Tablet      10 MG PO HS, TAB 0 Refills         Reported         5/1/15       Aspirin Chew (Aspirin Chew) 81 Mg Tab.chew      81 MG PO QDAY, TAB.CHEW 0 Refills         Reported         5/1/15       Omega 3 Polyunsat Fatty Acids (Lovaza) 1 Gm Capsule      1 GM PO BID, CAP         Reported         5/1/15       Triamterene/HCTZ 37.5/25 Mg (Triamterene/HCTZ 37.5/25 Mg) 1 Tab Tablet      0.5 TAB PO QAM, TAB 0 Refills         Reported         5/1/15       Potassium Chloride (K-Dur*) 10 Meq Tab.prt.sr      20 MEQ PO DAILY, 0 Refills         Reported         10/4/09      Current Medications      Current Medications Reviewed 4/2/21            EXAM      Vital Signs Reviewed      Gen: WDWN, Alert, NAD.        HEENT:  PERRL, EOMI.  OP, nares clear, no sinus tenderness.      Neck:  Supple, no JVD, no thyromegaly.      Lymph: No axillary, cervical, supraclavicular lymphadenopathy noted bilaterally.      Chest: Mildly decreased breath sounds throughout, no  wheezes, rhonchi or     crackles, normal work of breathing noted.  The patient is able to speak full se    ntences without difficulty.       CV:  RRR, no MGR, pulses 2+, equal.      Abd:  Soft, NT, ND, + BS, no HSM.      EXT:  No clubbing, no cyanosis, no edema, no joint tenderness.       Neuro:  A  Skin: No rashes or lesions.      Vitals      Vitals:             Height 70 in / 177.8 cm           Weight 221 lbs  / 100.02273 kg           BSA 2.18 m2           BMI 31.7 kg/m2           Temperature 97.8 F / 36.56 C - Temporal           Pulse 82           Respirations 16           Blood Pressure 119/82 Sitting, Left Arm           Pulse Oximetry 95%, room air            REVIEW      Results Reviewed      PCCS Results Reviewed?:  Yes Prev Lab Results, Yes Prev Radiology Results, Yes     Previous Mecial Records      Lab Results      I personally reviewed my last office visit notes. I also reviewed the patient's     CPAP compliance report with apnea hypopnea index of 1.6.            Assessment      Lung nodule - R91.1            Current smoker - F17.200            Notes      New Medications      * Umeclidinium/Vilanterol 62.5-25 Mcg Inh (Anoro Ellipta 62.5-25 Mcg Inh) 1 EACH      BLST.W.DEV: 1 PUFF INH QDAY 90 Days #3      * MDI-Albuterol (Ventolin HFA) 18 GM HFA.AER.AD: 2 PUFFS INH QID 90 Days #3      New Diagnostics      * Chest W/O Cont CT, 6 Months         Dx: Lung nodule - R91.1      ASSESSMENT:      1. 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       2. Chronic obstructive pulmonary disease without acute exacerbation on LABA/LAMA    therapy.       3. Obstructive sleep apnea on nightly CPAP, compliant on nightly CPAP.        4. Pulmonary nodule stable on last CT scan of the chest.       5. Tobacco abuse of cigarettes ongoing. The patient reports he is smoking 1.5     pack per day and has no desire to quit.             PLAN:      1. The patient will be due for repeat CT scan of the chest in September 2021,      order placed today.       2. Continue anoro everyday as prescribed.       3. Continue albuterol inhaler as needed.       4. The patient is advised to call the office, call 911 or go to the ER for any     new or worsening symptoms.       5. Continue CPAP at current settings and clean mask and tubing daily.       6. I spent 3 minutes today counseling the patient on smoking cessation.  I     counseled the patient on the risks of continued smoking including the risk of     lung cancer, head and neck cancer, renal cancer, heart disease, stroke, and     early death. The patient refuses nicotine replacement therapy and      pharmacotherapy at this time.  The patient is advised to decrease the amount of     cigarettes to the point where he can quit.        7. The patient reports he is up to date with flu and pneumonia vaccines. The     patient will need Prevnar 13 at his next office visit. The patient is advised to    receive the COVID-19 vaccine when available.  The patient is advised to follow     CDC recommendations such as social distancing, wearing a mask and washing hand     for at least 20 seconds.      8. Follow up with Dr. Mccormick in 6-9 months sooner if needed.            Patient Education      Tobacco Cessation Counseling:  for 3 - 10 minutes      Patient Education Provided:  COPD, Smoking Cessation      Time Spent:  > 50% /Coord Care            Electronically signed by OFE MILLER [facility]  04/07/2021 16:48       Disclaimer: Converted document may not contain table formatting or lab diagrams. Please see [employer] for the authenticated document.

## 2021-05-28 NOTE — PROGRESS NOTES
Patient: GUERRERO PALMA     Acct: [id number]     Report: #[id number]  UNIT #: [id number]     : 1955    Encounter Date:2018  PRIMARY CARE: RAYMOND LOPEZ [facility]  ***Signed***  --------------------------------------------------------------------------------------------------------------------  Chief Complaint      Encounter Date      Mar 9, 2018            Referring Provider      SELF,REFERRED PATIENT            Patient Complaint      Patient is complaining of      Pt here for 6 mth fu            VITALS      Height 5 ft 10 in / 177.8 cm      Weight 258 lbs 3 oz / 117.693162 kg      BSA 2.45 m2      BMI 37.0 kg/m2      Temperature 97.7 F / 36.5 C - Oral      Pulse 62      Respirations 20      Blood Pressure 122/88 Sitting, Right Arm      Pulse Oximetry 99%, room air            HPI      The patient is a very pleasant 63 year old white male with history of     obstructive sleep apnea, ongoing tobacco abuse, chronic obstructive pulmonary     disease and pulmonary artery aneurysm here today for follow up.             The patient is doing well at this time, he reports compliance with the use of     his CPAP and he feels it does help with his symptoms.  He wears it every night.     The patient is still smoking approximately a pack of cigarettes a day. He     reports compliance with the use of his Stiolto. He feels the Stiolto helps him.     He still has shortness of breath occurring daily. It is worse with exertion,     better with rest, lasting for a couple minutes each time. It occurs numerous     time throughout the day. It is worse when he is doing exertional activities. He     denies any associated chest pains or heart palpitations or lower extremity     edema.  He describes his symptoms as very mild to moderate in severity. It does     interfere with his day to day activities but he still tries to remain active.            ROS      Constitutional:  Complains of: Fatigue, Denies: Fever,  Weight gain, Weight loss    , Chills, Insomnia, Other      Respiratory/Breathing:  Denies: Shortness of air, Wheezing, Cough, Hemoptysis,     Pleuritic pain, Other      Endocrine:  Denies: Polydipsia, Polyuria, Heat/cold intolerance, Diabetes, Other      Eyes:  Denies: Blurred vision, Vision Changes, Other      Ears, nose, mouth, throat:  Denies: Congestion, Dysphagia, Hearing Changes,     Nose Bleeding, Nasal Discharge, Throat pain, Tinnitus, Other      Cardiovascular:  Denies: Chest Pain, Exertional dyspnea, Peripheral Edema,     Palpitations, Syncope, Wake up Gasping for air, Orthopnea, Tachycardia, Other      Gastrointestinal:  Denies: Abdominal pain/cramping, Bloody stools, Constipation    , Diarrhea, Melena, Nausea, Vomiting, Other      Genitourinary:  Denies: Dysuria, Urinary frequency, Incontinence, Hematuria,     Urgency, Other      Musculoskeletal:  Denies: Joint Pain, Joint Stiffness, Joint Swelling, Myalgias    , Other      Hematologic/lymphatic:  DENIES: Lymphadenopathy, Bruising, Bleeding tendencies,     Other      Neurologic:  Denies: Headache, Numbness, Weakness, Seizures, Other      Psychiatric:  Denies: Anxiety, Appropriate Effect, Depression, Other      Sleep:  No: Excessive daytime sleep, Morning Headache?, Snoring, Insomnia?,     Stop breathing at sleep?, Other      Integumentary:  Denies: Rash, Dry skin, Skin Warm to Touch, Other            FAMILY/SOCIAL/MEDICAL HX      Stroke - Family Hx:  Mother      Heart - Family Hx:  Mother      Diabetes - Family Hx:  Mother      Is Father Still Living?:  No      Is Mother Still Living?:  No      Social History:  Tobacco Use, Alcohol Use      Smoking status:  Current every day smoker (1.5 ppd x 40 y )      Anticoagulation Therapy:  Yes      Antibiotic Prophylaxis:  No      Medical History:  Yes: Seizures (2009), Heart Attack (disease ), Hemorrhoids/    Rectal Prob (acid reflux), Hiatal Hernia, High Blood Pressure, Stroke, No:     Blood Disease,  Chemotherapy/Cancer, Deafness or Ringing Ears, Shortness Of     Breath, Sinus Trouble            Hx Influenza Vaccination:  Yes      Date Influenza Vaccine Given:  Aug 1, 2016      Influenza Vaccine Declined:  Yes      2 or More Falls Past Year?:  No      Fall Past Year with Injury?:  No      Hx Pneumococcal Vaccination:  Yes      Encouraged to follow-up with:  PCP regarding preventative exams.      Chart initiated by      Kaylin Johnston MA            ALLERGIES/MEDICATIONS      Allergies:        Coded Allergies:             NO KNOWN DRUG ALLERGIES (Verified  Allergy, Mild, 3/9/18)      Medications    Last Reconciled on 3/9/18 09:52 by DAREK MCCORMICK MD      Tiotropium Br/Olodaterol HCl (Stiolto Respimat Inhal Spray) 4 Gm Mist.inhal      2 PUFFS INH RTQDAY, #1 INH 6 Refills         Prov: Darek Mccormick         11/28/17       MDI-Albuterol (Proair HFA) 8.5 Gm Hfa.aer.ad      2 PUFFS INH Q6H Y for SHORTNESS OF BREATH, #1 MDI 6 Refills         Prov: Darek Mccormick         11/28/17       Metoprolol Succinate (Metoprolol Succinate *) 100 Mg Tab.sr.24h      200 MG PO QDAY, #30 TAB.SR.24H         Reported         2/23/16       Lisinopril* (Lisinopril*) 5 Mg Tablet      5 MG PO QDAY, TAB 0 Refills         Reported         5/1/15       Apixaban (Eliquis) 5 Mg Tab      5 MG PO BID, TAB         Reported         5/1/15       levETIRAcetam (levETIRAcetam) 500 Mg Tablet      1000 MG PO BID, TAB         Reported         5/1/15       Simvastatin (Simvastatin*) 10 Mg Tablet      10 MG PO HS, TAB 0 Refills         Reported         5/1/15       Aspirin (Aspirin*) 81 Mg Tab.chew      81 MG PO QDAY, TAB.CHEW 0 Refills         Reported         5/1/15       Omega 3 Polyunsat Fatty Acids (Lovaza) 1 Gm Capsule      1 GM PO BID, CAP         Reported         5/1/15       Triamterene/HCTZ 37.5/25 Mg (Triamterene/HCTZ 37.5/25 Mg) 1 Tab Tablet      0.5 TAB PO QAM, TAB 0 Refills         Reported         5/1/15       Potassium Chloride (K-Dur*) 10  Meq Tab.prt.sr      20 MEQ PO DAILY, 0 Refills         Reported         10/4/09      Current Medications      Current Medications Reviewed 3/9/18            EXAM      GEN-patient appears stated age resting comfortable in no acute distress      Eyes-PERRL,  conjunctiva are normal in appearance extraocular muscles are intact    , no scleral icterus      Lymphatic-no swollen or enlarged cervical nodes, or axillary node, or femoral     nodes, or supraclavicular nodes      Mouth normal dentition, no erythema no ulcerations oropharynx appears normal no     exudate no evidence of postnasal drip,       Neck-there are no palpable supraclavicular or cervical adenopathy, thyroid is     normal in appearance no apparent nodules, there is no inspiratory or expiratory     stridor      Respiratory-patient exhibits normal work of breathing, speaking in full     sentences without difficulty, the chest is normal in appearance, clear to     auscultation with no wheezes rales or rhonchi, chest is normal to percussion on     both the right and left sides      Cardiovascular-the heart rate is normal and regular S1 and S2 present with no     murmur or extra heart sounds, there is no JVD or pedal edema present      GI-the abdomen is normal in appearance, bowel sounds present and normal in all     quadrants no hepatosplenomegaly or masses felt      Extremities-no clubbing is present, pulses present in all extremities,     capillary refill time is normal      Skin-skin is normal in appearance it is warm and dry, no rashes present, no     evidence of cyanosis, palpation reveals no masses      Neurological-the patient is alert and oriented to time place and person, moves     all 4 extremities, normal gait, normal affect and mood, CN2-12 intact      Psych-normal judgment and insight is good, normal mood and affect, alert and     oriented to person, place, and time, and date      Vitals      Vitals:             Height 5 ft 10 in / 177.8 cm            Weight 258 lbs 3 oz / 117.225630 kg           BSA 2.45 m2           BMI 37.0 kg/m2           Temperature 97.7 F / 36.5 C - Oral           Pulse 62           Respirations 20           Blood Pressure 122/88 Sitting, Right Arm           Pulse Oximetry 99%, room air            REVIEW      Results Reviewed      PCCS Results Reviewed?:  Yes Prev Lab Results, Yes Prev Radiology Results, Yes     Previous Mecial Records            PLAN      Assessment      Pulmonary artery aneurysm - I28.1            Notes      New Medications      * Tiotropium Br/Olodaterol HCl (Stiolto Respimat Inhal [place]) 4 GM MIST.INHAL:     2 PUFFS INH QDAY #1      New Diagnostics      * Chest W/ Cont CT, 6 Months       Dx: Pulmonary artery aneurysm - I28.1      ASSESSMENT/PLAN:      1. Pulmonary artery aneurysm. Obtain CT scan of the chest with IV contrast for     surveillance.       2. Chronic obstructive pulmonary disease with centrilobular emphysema. Continue     Stiolto and albuterol.  I stressed the importance of smoking cessation.       3. Tobacco abuse with active cigarettes smoking. I discussed the importance of     smoking cessation with the patient today. He is not interested in     pharmacological intervention. Time spent discussing smoking cessation with the     patient today 4 minutes.       4. Dyspnea improved.       5. Obstructive sleep apnea. Continue pap therapy at current settings.       6. Pulmonary nodule. CT scan as above.       7. I have personally reviewed laboratory data, imaging as well as previous     medical records.            Patient Education      Education resources provided:  Yes                 Disclaimer: Converted document may not contain table formatting or lab diagrams. Please see [employer] for the authenticated document.

## 2021-05-28 NOTE — PROGRESS NOTES
Patient: GUERRERO PALMA     Acct: [id number]     Report: #[id number]  UNIT #: [id number]     : 1955    Encounter Date:2019  PRIMARY CARE: R[facility]MOND LOPE[facility]  ***Signed***  --------------------------------------------------------------------------------------------------------------------  Chief Complaint      Encounter Date      Sep 24, 2019            Primary Care Provider            None            Referring Provider      SELF,REFERRED            Patient Complaint      Patient is complaining of      Patient here today for 6 month follow up and CT results            VITALS      Height 70 in / 177.8 cm      Weight 253 lbs  / 114.494509 kg      BSA 2.31 m2      BMI 36.3 kg/m2      Temperature 98.2 F / 36.78 C - Oral      Pulse 90      Respirations 14      Blood Pressure 100/70 Sitting, Left Arm      Pulse Oximetry 96%, room air            HPI      The patient is a very pleasant 64 year old white male patient of Dr. Mccormick's     also known to me from a previous office visit. He is here for 6 month follow up.          He had a CT scan of the chest done recently on 19 for follow up of his     pulmonary artery aneurysm. It was not explicitly mentioned on CT report but on     my review it does not appear to have changed in size.  He continues to have     aortic aneurysm as well and stable pulmonary nodules. He continues to smoke     cigarettes and reports he is smoking 1-1.5 packs per day and has no desire to     quit. He continues to use anoro daily and thinks it helps him. He has been using    albuterol long standing every day out of habit, not really because he needs it.     He typically uses his albuterol every morning along with his anoro. He denies     any increased dyspnea, coughing or wheezing, hemoptysis, fever or chills. He     feels like he has overall done quite well.  He reports compliance with his CPAP.            I reviewed his Review of Systems, medical, surgical  and family history and agree    with those as entered.      Copies To:   Darek Mccormick      Constitutional:  Denies: Fatigue, Fever, Weight gain, Weight loss, Chills,     Insomnia, Other      Respiratory/Breathing:  Complains of: Shortness of air; Denies: Wheezing, Cough,    Hemoptysis, Pleuritic pain, Other      Endocrine:  Denies: Polydipsia, Polyuria, Heat/cold intolerance, Diabetes, Other      Eyes:  Denies: Blurred vision, Vision Changes, Other      Ears, nose, mouth, throat:  Denies: Congestion, Dysphagia, Hearing Changes, Nose    Bleeding, Nasal Discharge, Throat pain, Tinnitus, Other      Cardiovascular:  Denies: Chest Pain, Exertional dyspnea, Peripheral Edema,     Palpitations, Syncope, Wake up Gasping for air, Orthopnea, Tachycardia, Other      Gastrointestinal:  Denies: Abdominal pain/cramping, Bloody stools, Constipation,    Diarrhea, Melena, Nausea, Vomiting, Other      Genitourinary:  Denies: Dysuria, Urinary frequency, Incontinence, Hematuria,     Urgency, Other      Musculoskeletal:  Denies: Joint Pain, Joint Stiffness, Joint Swelling, Myalgias,    Other      Hematologic/lymphatic:  DENIES: Lymphadenopathy, Bruising, Bleeding tendencies,     Other      Neurologic:  Denies: Headache, Numbness, Weakness, Seizures, Other      Psychiatric:  Denies: Anxiety, Appropriate Effect, Depression, Other      Sleep:  No: Excessive daytime sleep, Morning Headache?, Snoring, Insomnia?, Stop    breathing at sleep?, Other      Integumentary:  Denies: Rash, Dry skin, Skin Warm to Touch, Other            FAMILY/SOCIAL/MEDICAL HX      Stroke - Family Hx:  Mother      Heart - Family Hx:  Mother      Diabetes - Family Hx:  Mother      Is Father Still Living?:  No      Is Mother Still Living?:  No      Social History:  Tobacco Use, Alcohol Use      Smoking status:  Current every day smoker (1.5 ppd x 40 y )      Anticoagulation Therapy:  Yes      Antibiotic Prophylaxis:  No      Medical History:  Yes:  Seizures (2009), Heart Attack (disease ),     Hemorrhoids/Rectal Prob (acid reflux), Hiatal Hernia, High Blood Pressure,     Stroke; No: Blood Disease, Chemotherapy/Cancer, Deafness or Ringing Ears,     Shortness Of Breath, Sinus Trouble      Psychiatric History      none            PREVENTION      Hx Influenza Vaccination:  Yes      Date Influenza Vaccine Given:  Sep 1, 2018      Influenza Vaccine Declined:  Yes      2 or More Falls Past Year?:  No      Fall Past Year with Injury?:  No      Hx Pneumococcal Vaccination:  Yes      Encouraged to follow-up with:  PCP regarding preventative exams.      Chart initiated by      Thuy Wilson CMA            ALLERGIES/MEDICATIONS      Allergies:        Coded Allergies:             NO KNOWN DRUG ALLERGIES (Verified  Allergy, Mild, 9/24/19)      Medications    Last Reconciled on 9/24/19 08:40 by OSCAR DAVIS      Umeclidinium/Vilanterol 62.5-25 Mcg Inh (Anoro Ellipta 62.5-25 Mcg Inh) 1 Each     Blst.w.dev      1 PUFF INH QDAY, #1 INH 6 Refills         Prov: Ese Livingston PA-C         3/18/19       MDI-Albuterol (Ventolin HFA) 18 Gm Hfa.aer.ad      2 PUFFS INH Q4H PRN for SHORTNESS OF BREATH, #1 INH 9 Refills         Prov: Darek Mccormick         9/18/18       Metoprolol Succinate (Metoprolol Succinate) 100 Mg Tab.sr.24h      200 MG PO QDAY, #30 TAB.SR.24H         Reported         2/23/16       Lisinopril* (Lisinopril*) 5 Mg Tablet      5 MG PO QDAY, TAB 0 Refills         Reported         5/1/15       Apixaban (Eliquis) 5 Mg Tab      5 MG PO BID, TAB         Reported         5/1/15       levETIRAcetam (levETIRAcetam) 500 Mg Tablet      1000 MG PO BID, TAB         Reported         5/1/15       Simvastatin (Simvastatin*) 10 Mg Tablet      10 MG PO HS, TAB 0 Refills         Reported         5/1/15       Aspirin Chew (Aspirin Chew) 81 Mg Tab.chew      81 MG PO QDAY, TAB.CHEW 0 Refills         Reported         5/1/15       Omega 3 Polyunsat Fatty Acids (Lovaza) 1 Gm  Capsule      1 GM PO BID, CAP         Reported         5/1/15       Triamterene/HCTZ 37.5/25 Mg (Triamterene/HCTZ 37.5/25 Mg) 1 Tab Tablet      0.5 TAB PO QAM, TAB 0 Refills         Reported         5/1/15       Potassium Chloride (K-Dur*) 10 Meq Tab.prt.sr      20 MEQ PO DAILY, 0 Refills         Reported         10/4/09      Current Medications      Current Medications Reviewed 9/24/19            EXAM      GEN-patient appears stated age resting comfortable in no acute distress      Eyes-PERRL,  conjunctiva are normal in appearance extraocular muscles are     intact, no scleral icterus      Lymphatic-no swollen or enlarged cervical nodes, or axillary node, or femoral     nodes, or supraclavicular nodes      Mouth normal dentition, no erythema no ulcerations oropharynx appears normal no     exudate no evidence of postnasal drip, MP       Neck-there are no palpable supraclavicular or cervical adenopathy, thyroid is n    ormal in appearance no apparent nodules, there is no inspiratory or expiratory     stridor      Respiratory-trace expiratory wheezing, no rhonchi or crackles appreciated,     normal work of breathing noted.        Cardiovascular-the heart rate is normal and regular S1 and S2 present with no     murmur or extra heart sounds, there is no JVD or pedal edema present      GI-the abdomen is normal in appearance, bowel sounds present and normal in all     quadrants no hepatosplenomegaly or masses felt      Extremities-no clubbing is present, pulses present in all extremities, capillary    refill time is normal      Musculoskeletal-Normal strength in upper and lower extremities, inspection shows    no evidence of muscle atrophy      Skin-skin is normal in appearance it is warm and dry, no rashes present, no     evidence of cyanosis, palpation reveals no masses      Neurological-the patient is alert and oriented to time place and person, moves     all 4 extremities, normal gait, normal affect and mood, CN2-12  intact      Psych-normal judgment and insight is good, normal mood and affect, alert and     oriented to person, place, and time, and date      Vitals      Vitals:             Height 70 in / 177.8 cm           Weight 253 lbs  / 114.453285 kg           BSA 2.31 m2           BMI 36.3 kg/m2           Temperature 98.2 F / 36.78 C - Oral           Pulse 90           Respirations 14           Blood Pressure 100/70 Sitting, Left Arm           Pulse Oximetry 96%, room air            REVIEW      Results Reviewed      PCCS Results Reviewed?:  Yes Prev Lab Results, Yes Prev Radiology Results, Yes     Previous Mecial Records      Radiographic Results               [facility]                PACS RADIOLOGY REPORT            Patient: GUERRERO PALMA   Acct: #[id number]   Report: #[id number]            UNIT #: [id number]    DOS: 19 1115      INSURANCE:[facility] [place]   ORDER #:[id number]      LOCATION:[facility]     : 1955            PROVIDERS      ADMITTING:     ATTENDING: Ese Livingston PA-C      FAMILY:  NONE,MD   ORDERING:  Ese Livingston PA-C         OTHER: HOLLY DIAZ   DICTATING:  BENTLEY WALDRON MD            REQ #:[id number]   [facility]      REASON FOR EXAM:        REASON FOR VISIT:  COPD            *******Signed******         This report includes an Addendum and supersedes previous reports for this exam.             PROCEDURE:   CT CHEST WITHOUT CONTRAST             COMPARISON:   [facility], CT, CHEST W/ CONTRAST,     2018, 8:06.             INDICATIONS:   FOLLOW UP ANEURYSM. NO COMPLAINTS.             TECHNIQUE:   CT images were created without the administration of contrast     material.               PROTOCOL:     Standard imaging protocol performed                RADIATION:     DLP: 685.2mGy*cm          Automated exposure control was utilized to minimize radiation dose.               FINDINGS:         Vascular:  Aortic root measures up to 4 cm, previously measured at 3.9 cm.      Ascending thoracic       aorta measures 3.6 cm.  Mid descending thoracic aorta measures 2.5 cm.  No     evidence for dissection.              Chest:  No adenopathy in the chest.  Coronary artery calcification.             Moderate-sized hiatal hernia.  No acute findings in the included upper abdomen.     Stable 6-7 mm       nodule in the medial left lower lobe.  Stable 6 mm nodule in the posterior     medial right lower lobe.        No dense consolidation.  No aggressive appearing bone change.               CONCLUSION:   Stable dilated aortic root.  Remainder of the aorta has normal     caliber.             No acute findings in the chest.  Stable lower lobe nodules.              BENTLEY WALDRON MD             Electronically Signed and Approved By: BENTLEY WALDRON MD on 9/16/2019 at 13:07                ADDENDUM:              The main pulmonary artery measures up to 3.5 cm in diameter, which is not     significantly changed       from the previous study.  A prominent area in the distal left main pulmonary     artery measures       approximately 4.2 x 3.9 cm.  This is not significantly changed from the previous    study when       allowing for differences slices and technique.                BENTLEY WALDRON MD             Electronically Signed and Approved By: BENTLEY WALDRON MD on 9/24/2019 at 16:27                        Until signed, this is an unconfirmed preliminary report that may contain      errors and is subject to change.                                              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 Pulmonary artery aneurysm, stable on most recent CT of the chest.       2. Chronic obstructive pulmonary disease without acute exacerbation.        3. Tobacco abuse with active cigarette smoking ongoing.        4. Obstructive sleep apnea on nightly CPAP.       5. Pulmonary nodule stable on recent CT scan of the chest.              PLAN:      1. At this time I have discussed with the patient regarding his recent CT scan     of the chest results. I attempted to discuss with radiology regarding pulmonary     artery aneurysm as the size of the aneurysm was not noted on CT scan report.     Unfortunately I was not able to reach anyone with radiology by phone so I will     attempt this again or discuss with my . On my review, pulmonary     artery aneurysm does appear stable on imaging. Pulmonary nodules were also     stable. Continue with 1 year follow up CT scan of the chest.       2. Continue anoro 1 puff once daily. Use albuterol rescue inhaler only as needed    instead of routinely every day. The patient verbalized understanding.       3. I have counseled the patient for 3 minutes on smoking cessation and offered      pharmacologic therapy and nicotine replacement therapy but he is not interested     in quitting at this time.       4. He will need flu vaccine when it is available in fall 2019. He needs Prevnar     13 when he is 66.       5. Continue CPAP on current settings. I will review CPAP compliance data when it    is available.        6. Follow up with Dr. Mccormick in 1 year,  sooner if needed.             ADDENDUM:      I was able to speak with the radiologist who reviewed the patient's CT scan of     the chest and confirmed that pulmonary artery aneurysm has remained roughly the     same in size and any difference in measurement would appear to  just be based on     how it was measured rather than an actual increase in size of the pulmonary     artery aneurysm. He is to make an addendum to the report reflecting this.            Patient Education      Tobacco Cessation Counseling:  for 3 - 10 minutes      Patient Education Provided:  COPD, How to use an Inhaler, Smoking Cessation      Time Spent:  > 50% /[facility]                 Disclaimer: Converted document may not contain table formatting or lab diagrams. Please see [employer] for the authenticated document.

## 2021-05-28 NOTE — PROGRESS NOTES
Patient: GUERRERO PALMA     Acct: [id number]     Report: #[id number]  UNIT #: [id number]     : 1955    Encounter Date:2020  PRIMARY CARE: RAYMOND LOPEZ [facility]  ***Signed***  --------------------------------------------------------------------------------------------------------------------  Chief Complaint      Encounter Date      Sep 23, 2020            Primary Care Provider      RAYMOND LOPEZ [facility]            Patient Complaint      Patient is complaining of      PT here today for F/U, COPD, KATHARINE            VITALS      Height 5 ft 10 in / 177.8 cm      Weight 226 lbs  / 102.459235 kg      BSA 2.20 m2      BMI 32.4 kg/m2      Temperature 97.7 F / 36.5 C - Temporal      Pulse 71      Respirations 15      Blood Pressure 106/56 Sitting, Left Arm      Pulse Oximetry 98%, room air            HPI      The patient is a 65 year old male, patient of Dr. Mccormick's who has a history of    pulmonary artery aneurysm, lung nodules, COPD and obstructive sleep apnea who     presents for follow up visit today. The patient states that since last visit his    breathing is at baseline. The patient states that he will get short of breath     that is worse with exertion, moderate in severity and improved with rest.  The     patient currently denies any cough or wheezing.  The patient denies any fever,     chills, night sweats, hemoptysis, purulent sputum production, chest pain, chest     tightness, swollen glands in the head and neck, nausea, vomiting or diarrhea.      The patient denies any headaches, myalgias, changes in sense of taste and/or     smell or any other coronavirus or flu-like symptoms.  The patient did have a     repeat chest CT scan completed on 2020 and pulmonary artery aneurysm and     lung nodules appear stable. The patient does continue to smoke and reports he is    smoking one and a half pack of cigarettes a day and has no interest in quitting.     The patient states he has  not had to take any antibiotics or steroids since     last visit and denies any hospitalizations since last visit.  The patient states    he typically uses albuterol in the morning along with his Anoro inhaler once     daily.  The patient states he is also wearing a CPAP machine everynight and it     helps him sleep.  The patient states he is not having any morning headaches and     denies any excessive daytime sleepiness.  The patient states he is able to     perform all of his ADLs without difficulty.            I have personally reviewed the review of systems, past family, social, surgical     and medical histories and I agree with those as entered in the chart.      Copies To:   Darek Mccormick      Constitutional:  Denies: Fatigue, Fever, Weight gain, Weight loss, Chills,     Insomnia, Other      Respiratory/Breathing:  Complains of: Shortness of air; Denies: Wheezing, Cough,    Hemoptysis, Pleuritic pain, Other      Endocrine:  Denies: Polydipsia, Polyuria, Heat/cold intolerance, Diabetes, Other      Eyes:  Denies: Blurred vision, Vision Changes, Other      Ears, nose, mouth, throat:  Denies: Congestion, Dysphagia, Hearing Changes, Nose    Bleeding, Nasal Discharge, Throat pain, Tinnitus, Other      Cardiovascular:  Denies: Chest Pain, Exertional dyspnea, Peripheral Edema,     Palpitations, Syncope, Wake up Gasping for air, Orthopnea, Tachycardia, Other      Gastrointestinal:  Denies: Abdominal pain/cramping, Bloody stools, Constipation,    Diarrhea, Melena, Nausea, Vomiting, Other      Genitourinary:  Denies: Dysuria, Urinary frequency, Incontinence, Hematuria,     Urgency, Other      Musculoskeletal:  Denies: Joint Pain, Joint Stiffness, Joint Swelling, Myalgias,    Other      Hematologic/lymphatic:  DENIES: Lymphadenopathy, Bruising, Bleeding tendencies,     Other      Neurologic:  Denies: Headache, Numbness, Weakness, Seizures, Other      Psychiatric:  Denies: Anxiety, Appropriate Effect,  Depression, Other      Sleep:  No: Excessive daytime sleep, Morning Headache?, Snoring, Insomnia?, Stop    breathing at sleep?, Other      Integumentary:  Denies: Rash, Dry skin, Skin Warm to Touch, Other            FAMILY/SOCIAL/MEDICAL HX      Stroke - Family Hx:  Mother      Heart - Family Hx:  Mother      Diabetes - Family Hx:  Mother      Social History:  Tobacco Use, Alcohol Use      Smoking status:  Current every day smoker (smokes 1.5 PPD, smoker X50 yrs)      Anticoagulation Therapy:  Yes      Antibiotic Prophylaxis:  No      Medical History:  Yes: Seizures (2009), Heart Attack (disease ),     Hemorrhoids/Rectal Prob (acid reflux), Hiatal Hernia, High Blood Pressure,     Stroke; No: Blood Disease, Chemotherapy/Cancer, Deafness or Ringing Ears,     Shortness Of Breath, Sinus Trouble      Psychiatric History      none            PREVENTION      Hx Influenza Vaccination:  Yes      Date Influenza Vaccine Given:  Sep 1, 2019      Influenza Vaccine Declined:  No      2 or More Falls in Past Year?:  No      Fall Past Year with Injury?:  No      Hx Pneumococcal Vaccination:  Yes      Encouraged to follow-up with:  PCP regarding preventative exams.      Chart initiated by      Thuy Wilson CMA            ALLERGIES/MEDICATIONS      Allergies:        Coded Allergies:             NO KNOWN DRUG ALLERGIES (Verified  Allergy, Mild, 9/24/19)      Medications    Last Reconciled on 9/23/20 09:16 by OFE MILLER,       Umeclidinium/Vilanterol 62.5-25 Mcg Inh (Anoro Ellipta 62.5-25 Mcg Inh) 1 Each     Blst.w.dev      1 PUFF INH QDAY, #1 INH 11 Refills         Prov: OFE MILLER [facility]         9/23/20       MDI-Albuterol (Ventolin HFA) 8 Gm Hfa.aer.ad      2 PUFFS INH Q4H PRN for SHORTNESS OF BREATH for 30 Days, #1 MDI 4 Refills         Prov: Darek Mccormick         6/26/20       MDI-Albuterol (Ventolin HFA) 18 Gm Hfa.aer.ad      2 PUFFS INH Q4H PRN for SHORTNESS OF BREATH, #1 INH 3 Refills         Prov:  AnnyDarek gold         1/29/20       Metoprolol Succinate (Metoprolol Succinate) 100 Mg Tab.sr.24h      200 MG PO QDAY, #30 TAB.SR.24H         Reported         2/23/16       Lisinopril* (Lisinopril*) 5 Mg Tablet      5 MG PO QDAY, TAB 0 Refills         Reported         5/1/15       Apixaban (Eliquis) 5 Mg Tab      5 MG PO BID, TAB         Reported         5/1/15       levETIRAcetam (levETIRAcetam) 500 Mg Tablet      1000 MG PO BID, TAB         Reported         5/1/15       Simvastatin (Simvastatin*) 10 Mg Tablet      10 MG PO HS, TAB 0 Refills         Reported         5/1/15       Aspirin Chew (Aspirin Chew) 81 Mg Tab.chew      81 MG PO QDAY, TAB.CHEW 0 Refills         Reported         5/1/15       Omega 3 Polyunsat Fatty Acids (Lovaza) 1 Gm Capsule      1 GM PO BID, CAP         Reported         5/1/15       Triamterene/HCTZ 37.5/25 Mg (Triamterene/HCTZ 37.5/25 Mg) 1 Tab Tablet      0.5 TAB PO QAM, TAB 0 Refills         Reported         5/1/15       Potassium Chloride (K-Dur*) 10 Meq Tab.prt.sr      20 MEQ PO DAILY, 0 Refills         Reported         10/4/09      Current Medications      Current Medications Reviewed 9/23/20            EXAM      Vital Signs Reviewed.      General:  WDWN, Alert, NAD.      HEENT: PERRL, EOMI.  OP, nares clear, no sinus tenderness.      Neck: Supple, no JVD, no thyromegaly.      Lymph: No axillary, cervical, supraclavicular lymphadenopathy noted bilaterally.      Chest: Lungs clear to auscultation bilaterally, no wheezes, rales or rhonchi,     normal work of breathing noted, patient able to speak full sentences without     difficulty.       CV: RRR, no MGR, pulses 2+, equal.        Abd: Soft, NT, ND, +BS, no HSM.      EXT: No clubbing, no cyanosis, no edema, no joint tenderness.        Neuro:  A  Skin: No rashes or lesions.      Vitals      Vitals:             Height 5 ft 10 in / 177.8 cm           Weight 226 lbs  / 102.626275 kg           BSA 2.20 m2           BMI 32.4 kg/m2            Temperature 97.7 F / 36.5 C - Temporal           Pulse 71           Respirations 15           Blood Pressure 106/56 Sitting, Left Arm           Pulse Oximetry 98%, room air            REVIEW      Results Reviewed      PCCS Results Reviewed?:  Yes Prev Lab Results, Yes Prev Radiology Results, Yes     Previous [facility] Records      Lab Results      I reviewed patient's noncontrast chest CT dated 2020.  I reviewed Bernadette Cool last office visit note.      Radiographic Results               [facility]                PACS RADIOLOGY REPORT            Patient: GUERRERO PALMA   Acct: #[id number]   Report: #[id number]            UNIT #: [id number]    DOS: 20 1015      INSURANCE:[facility]   ORDER #:[id number]      LOCATION:[facility]     : 1955            PROVIDERS      ADMITTING:     ATTENDING: IVAN BERNAL PA-C      FAMILY:  RAYMOND LOPEZ [facility]   ORDERING:  IVAN BERNAL PA-C         OTHER: HOLLY DIAZ   DICTATING:  BENTLEY WALDRON MD            REQ #:[id number]   EXAM:[facility] - CT CHEST without CONTRAST      REASON FOR EXAM:        REASON FOR VISIT:  PULMONARY NODULE            *******Signed******         PROCEDURE:   CT CHEST WITHOUT CONTRAST             COMPARISON:   [facility], CT, CHEST W/O CONTRAST,     2019, 11:07.             INDICATIONS:   FOLLOW UP PREVIOUS CT CHEST.             TECHNIQUE:   CT images were created without the administration of contrast     material.               PROTOCOL:     Standard imaging protocol performed                RADIATION:     DLP: 516mGy*cm          Automated exposure control was utilized to minimize radiation dose.              FINDINGS:         Vascular:  Aortic root measures 3.7 cm and is stable.  Ascending thoracic aorta     measures 3.8 cm.        Mid descending thoracic aorta measures 2.3 cm.  Main pulmonary artery measures     3.7 cm and  is       stable.  A prominent area in the distal left main pulmonary artery measures up     to 3.8 cm and is       unchanged.  Coronary artery calcification.             Chest:  Sub cm pulmonary nodules are not significantly changed.  Cardiomegaly.      Moderate-sized       hiatal hernia.  No acute findings in the included upper abdomen.               CONCLUSION:   No significant change from the previous study.  An area of     prominence in the distal       left main pulmonary artery is unchanged.             Stable sub cm pulmonary nodules.              BENTLEY WALDRON MD             Electronically Signed and Approved By: BENTLEY WALDRON MD on 9/16/2020 at 11:01                               Until signed, this is an unconfirmed preliminary report that may contain      errors and is subject to change.                                              DOWER:      D:09/16/20 1101            Assessment      Lung nodule - R91.1            Pulmonary artery aneurysm - I28.1            Notes      Renewed Medications      * Umeclidinium/Vilanterol 62.5-25 Mcg Inh (Anoro Ellipta 62.5-25 Mcg Inh) 1 EACH      BLST.W.DEV: 1 PUFF INH QDAY #1      New Diagnostics      * Chest W/O Cont CT, Year         Dx: Lung nodule - R91.1      New Office Procedures      * Flu Vacc Fluarix Quadrivalent, Routine         Flu Vacc (6Mos Up)/Pf (Fluarix Quadrivalent Syringe) 60 MCG/0.5 ML SYRINGE:        60 MICROGRAM INTRAMUSCULARLY Qty 1 SYRINGE      ASSESSMENT:       1. Pulmonary artery aneurysm, stable on most recent CT of the chest.      2.  COPD without acute exacerbation.      3.  Obstructive sleep apnea on nightly CPAP.      4. Pulmonary nodule, stable on recent CT scan of the chest.      5. Tobacco abuse with cigarettes, ongoing, patient has no desire to quit.            PLAN:      1.  I discussed with patient regarding his recent CT scan of the chest results.     Recommended referral to vascular surgeon for further evaluation of pulmonary     artery  aneurysm, however patient declines at this time.  The patient states that    he sees too many doctors and absolutely does not want to go see another doctor     at this time.      2. I will order a follow up chest CT scan again in one year from now.      3.  The patient to continue Anoro everyday as prescribed.      4. The patient to continue albuterol inhaler as needed.      5.  I spent four minutes today counseling the patient on smoking cessation.  I     counseled the patient on the risks of continued smoking including the risk of     lung cancer, head and neck cancer, renal cancer, heart disease, stroke, and     early death. The patient refuses nicotine therapy and pharmacotherapy at this     time.  The patient is advised to decrease the number of cigarettes he is smoking    up to the point where he can quit.        6.  Flu vaccine given to patient in the office today.  The patient will need a     Prevnar 13 when he is 66.        7. The patient to continue CPAP on current settings and clean mask and tubing     daily.  I will request a copy of patient's CPAP report from [facility] and notify     patient of any changes in in the settings need to be changed.      8. Patient is advised to call the office, 911 or go to the ER with any new or     worsening symptoms.      9.  Follow up with Dr. Mccormick in six months, sooner if needed.            Patient Education      Tobacco Cessation Counseling:  for 3 - 10 minutes      Patient Education Provided:  COPD, Smoking Cessation      Time Spent:  > 50% /Coord Care            Electronically signed by OFE MILLER [facility]  09/24/2020 19:44       Disclaimer: Converted document may not contain table formatting or lab diagrams. Please see [facility] for the authenticated document.

## 2021-05-28 NOTE — PROGRESS NOTES
Patient: GUERRERO PALMA     Acct: [id number]     Report: #[id number]  UNIT #: [id number]     : 1955    Encounter Date:2019  PRIMARY CARE: RAYMOND LOPEZ [facility]  ***Signed***  --------------------------------------------------------------------------------------------------------------------  Chief Complaint      Encounter Date      Mar 18, 2019            Primary Care Provider            None            Referring Provider      SELF,REFERRED            Patient Complaint      Patient is complaining of      Pt here for 6m f/u, copd            VITALS      Height 5 ft 10 in / 177.8 cm      Weight 270 lbs 0 oz / 122.378051 kg      BSA 2.37 m2      BMI 38.7 kg/m2      Temperature 98.1 F / 36.72 C - Oral      Pulse 100      Respirations 16      Blood Pressure 131/93 Sitting, Right Arm      Pulse Oximetry 95%, Room air            HPI      The patient is a very pleasant 64 year old white male patient of Dr. Mccormick's     here for 6 month follow up today. 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     The patient states that he has been doing well since then and denies any     increased dyspnea, coughing or wheezing. He denies hemoptysis, fevers or chills.    He is still using Anoro and thinks it helps him somewhat. He is a little     confused about how to use his albuterol and says he has been using it every     morning out of habit. He continues to smoke cigarettes and tells me he is not     interested in quitting.             I have reviewed his Review of Systems medical, surgical and family history and     agree with those as entered.      Copies To:   Darek Mccormick ;            AMANDA      Constitutional:  Denies: Fatigue, Fever, Weight gain, Weight loss, Chills, In    somnia, Other      Respiratory/Breathing:  Complains of: Shortness of air; Denies: Wheezing, Cough,    Hemoptysis, Pleuritic pain, Other       Endocrine:  Denies: Polydipsia, Polyuria, Heat/cold intolerance, Diabetes, Other      Eyes:  Denies: Blurred vision, Vision Changes, Other      Ears, nose, mouth, throat:  Denies: Congestion, Dysphagia, Hearing Changes, Nose    Bleeding, Nasal Discharge, Throat pain, Tinnitus, Other      Cardiovascular:  Denies: Chest Pain, Exertional dyspnea, Peripheral Edema,     Palpitations, Syncope, Wake up Gasping for air, Orthopnea, Tachycardia, Other      Gastrointestinal:  Denies: Abdominal pain/cramping, Bloody stools, Constipation,    Diarrhea, Melena, Nausea, Vomiting, Other      Genitourinary:  Denies: Dysuria, Urinary frequency, Incontinence, Hematuria,     Urgency, Other      Musculoskeletal:  Denies: Joint Pain, Joint Stiffness, Joint Swelling, Myalgias,    Other      Hematologic/lymphatic:  DENIES: Lymphadenopathy, Bruising, Bleeding tendencies,     Other      Neurologic:  Denies: Headache, Numbness, Weakness, Seizures, Other      Psychiatric:  Denies: Anxiety, Appropriate Effect, Depression, Other      Sleep:  No: Excessive daytime sleep, Morning Headache?, Snoring, Insomnia?, Stop    breathing at sleep?, Other      Integumentary:  Denies: Rash, Dry skin, Skin Warm to Touch, Other            FAMILY/SOCIAL/MEDICAL HX      Stroke - Family Hx:  Mother      Heart - Family Hx:  Mother      Diabetes - Family Hx:  Mother      Is Father Still Living?:  No      Is Mother Still Living?:  No      Social History:  Tobacco Use, Alcohol Use      Smoking status:  Current every day smoker (1.5 ppd x 40 y )      Anticoagulation Therapy:  Yes      Antibiotic Prophylaxis:  No      Medical History:  Yes: Seizures (2009), Heart Attack (disease ),     Hemorrhoids/Rectal Prob (acid reflux), Hiatal Hernia, High Blood Pressure,     Stroke; No: Blood Disease, Chemotherapy/Cancer, Deafness or Ringing Ears,     Shortness Of Breath, Sinus Trouble      Psychiatric History      None            PREVENTION      Hx Influenza Vaccination:  Yes       Date Influenza Vaccine Given:  Sep 1, 2018      Influenza Vaccine Declined:  Yes      2 or More Falls Past Year?:  No      Fall Past Year with Injury?:  No      Hx Pneumococcal Vaccination:  Yes      Encouraged to follow-up with:  PCP regarding preventative exams.      Chart initiated by      Pebbles Graham MA            ALLERGIES/MEDICATIONS      Allergies:        Coded Allergies:             NO KNOWN DRUG ALLERGIES (Verified  Allergy, Mild, 3/18/19)      Medications    Last Reconciled on 3/18/19 08:22 by OSCAR DAVIS      Umeclidinium/Vilanterol 62.5-25 Mcg Inh (Anoro Ellipta 62.5-25 Mcg Inh) 1 Each     Blst.w.dev      1 PUFF INH QDAY, #1 INH 6 Refills         Prov: Ese Livingston PA-C         3/18/19       MDI-Albuterol (Ventolin HFA) 18 Gm Hfa.aer.ad      2 PUFFS INH Q4H PRN for SHORTNESS OF BREATH, #1 INH 9 Refills         Prov: Darek Mccormick         9/18/18       Metoprolol Succinate (Metoprolol Succinate *) 100 Mg Tab.sr.24h      200 MG PO QDAY, #30 TAB.SR.24H         Reported         2/23/16       Lisinopril* (Lisinopril*) 5 Mg Tablet      5 MG PO QDAY, TAB 0 Refills         Reported         5/1/15       Apixaban (Eliquis) 5 Mg Tab      5 MG PO BID, TAB         Reported         5/1/15       levETIRAcetam (levETIRAcetam) 500 Mg Tablet      1000 MG PO BID, TAB         Reported         5/1/15       Simvastatin (Simvastatin*) 10 Mg Tablet      10 MG PO HS, TAB 0 Refills         Reported         5/1/15       Aspirin Chew (Aspirin Chew) 81 Mg Tab.chew      81 MG PO QDAY, TAB.CHEW 0 Refills         Reported         5/1/15       Omega 3 Polyunsat Fatty Acids (Lovaza) 1 Gm Capsule      1 GM PO BID, CAP         Reported         5/1/15       Triamterene/HCTZ 37.5/25 Mg (Triamterene/HCTZ 37.5/25 Mg) 1 Tab Tablet      0.5 TAB PO QAM, TAB 0 Refills         Reported         5/1/15       Potassium Chloride (K-Dur*) 10 Meq Tab.prt.sr      20 MEQ PO DAILY, 0 Refills         Reported         10/4/09      Current  Medications      Current Medications Reviewed 3/18/19            EXAM      GEN-patient appears stated age resting comfortable in no acute distress      Eyes-PERRL,  conjunctiva are normal in appearance extraocular muscles are     intact, no scleral icterus      Nasal-both nares are patent turbinates appear normal no polyps seen no nasal     discharge or ulcerations      Ears-tympanic membranes are normal no erythema no bulging, normal to inspection      Lymphatic-no swollen or enlarged cervical nodes, or axillary node, or femoral     nodes, or supraclavicular nodes      Mouth normal dentition, no erythema no ulcerations oropharynx appears normal no     exudate no evidence of postnasal drip, MP(default value)      Neck-there are no palpable supraclavicular or cervical adenopathy, thyroid is     normal in appearance no apparent nodules, there is no inspiratory or expiratory     stridor      Respiratory-mildly decreased bilateral breath sounds throughout, no wheezing,     rhonchi or crackles appreciated, normal work of breathing noted.        Cardiovascular-the heart rate is normal and regular S1 and S2 present with no     murmur or extra heart sounds, there is no JVD or pedal edema present      GI-the abdomen is normal in appearance, bowel sounds present and normal in all     quadrants no hepatosplenomegaly or masses felt      Extremities-no clubbing is present, pulses present in all extremities, capillary    refill time is normal      Musculoskeletal-Normal strength in upper and lower extremities, inspection shows    no evidence of muscle atrophy      Skin-skin is normal in appearance it is warm and dry, no rashes present, no     evidence of cyanosis, palpation reveals no masses      Neurological-the patient is alert and oriented to time place and person, moves     all 4 extremities, normal gait, normal affect and mood, CN2-12 intact      Psych-normal judgment and insight is good, normal mood and affect, alert and      oriented to person, place, and time, and date      Vitals      Vitals:             Height 5 ft 10 in / 177.8 cm           Weight 270 lbs 0 oz / 122.666115 kg           BSA 2.37 m2           BMI 38.7 kg/m2           Temperature 98.1 F / 36.72 C - Oral           Pulse 100           Respirations 16           Blood Pressure 131/93 Sitting, Right Arm           Pulse Oximetry 95%, Room air            REVIEW      Results Reviewed      PCCS Results Reviewed?:  Yes Prev Lab Results, Yes Prev Radiology Results, Yes     Previous [facility]ecial Records            Assessment      Pulmonary artery aneurysm - 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            COPD (chronic obstructive pulmonary disease) - J44.9            Notes      New Medications      * Umeclidinium/Vilanterol 62.5-25 Mcg Inh (Anoro Ellipta 62.5-25 Mcg Inh) 1 EACH      BLST.W.DEV: 1 PUFF INH QDAY #1      Discontinued Medications      * MDI-Albuterol (Proair HFA) 8.5 GM HFA.AER.AD: 2 PUFFS INH Q6H PRN SHORTNESS OF      BREATH #1      * Umeclidinium/Vilanterol 62.5-25 Mcg Inh (Anoro Ellipta 62.5-25 Mcg Inh) 1 EACH      BLST.W.DEV: 1 PUFF INH QDAY #1         Instructions: To replace Stiolto      * Umeclidinium/Vilanterol 62.5-25 Mcg Inh (Anoro Ellipta 62.5-25 Mcg Inh) 1 EACH      BLST.W.DEV: 1 PUFF INH QDAY #1      New Diagnostics      * Chest W/O Cont CT, 6 Months         Dx: Pulmonary artery aneurysm - I28.1      ASSESSMENT/PLAN:      1. Pulmonary artery aneurysm, stable on most recent CT of the chest.       2. Chronic obstructive pulmonary disease without acute exacerbation.        3. Tobacco abuse with active cigarette smoking.        4. Obstructive sleep apnea on nightly CPAP.             PLAN:      1. At this time I will continue the patient on Anoro 1 puff once daily. I have     counseled the patient that he should use his albuterol rescue inhaler as needed     q 4-6 hours for shortness of breath, coughing or wheezing and not routinely     every morning. The patient verbalized understanding.        2. I will repeat a chest CT scan in 6 months for follow up of his pulmonary     artery aneurysm.       3. He is up to date on his flu vaccine. He will need Prevnar when he is 66.       4. Continue nightly CPAP at current settings and reports good compliance with     this.       5. I have counseled the patient for 4 minutes on smoking cessation and he says     he is not interested in quitting at this time. He verbalized understanding that     there are options in terms of pharmacologic therapy and nicotine replacement     therapy if he changes his mind in the future.       6. Follow up with Dr. Mccormick in 6 months, sooner if needed.            Patient Education      Tobacco Cessation Counseling:  for 3 - 10 minutes      Patient Education Provided:  COPD, How to use an Inhaler, Smoking Cessation      Time Spent:  > 50% /[facility]            Patient Education:        Chronic Obstructive Pulmonary Disease                 Disclaimer: Converted document may not contain table formatting or lab diagrams. Please see [employer] for the authenticated document.

## 2021-05-28 NOTE — PROGRESS NOTES
Patient: GUERRERO PALMA     Acct: [id number]     Report: #[id number]  UNIT #: [id number]     : 1955    Encounter Date:2018  PRIMARY CARE: RAYMOND LOPEZ [facility]  ***Signed***  --------------------------------------------------------------------------------------------------------------------  Chief Complaint      Encounter Date      Sep 18, 2018            Primary Care Provider            None            Referring Provider      SELF,REFERRED            Patient Complaint      Patient is complaining of      Pt here for 6m f/u, chest ct results,Pulmonary artery aneurysm            VITALS      Height 5 ft 10 in / 177.8 cm      Weight 268 lbs 8 oz / 121.785597 kg      BSA 2.50 m2      BMI 38.5 kg/m2      Temperature 98.3 F / 36.83 C - Oral      Pulse 76      Respirations 16      Blood Pressure 127/95 Sitting, Right Arm      Pulse Oximetry 95%, Room air            HPI      The patient is a very pleasant 63-year-old white male who has a history of COPD     and pulmonary artery aneurysm here today for followup. The patient is doing well    at this time and had a CT scan of the chest in 2018 that showed no     change in pulmonary artery aneurysm. The patient still has dyspnea on exertion,     no worse, improved with Anoro. No increased cough, no increased sputum     production. The patient is still smoking on a daily basis and is not interested     in quitting at this time.            ROS      Constitutional:  Denies: Fatigue, Fever, Weight gain, Weight loss, Chills,     Insomnia, Other      Respiratory/Breathing:  Complains of: Shortness of air, Wheezing, Cough; Denies:    Hemoptysis, Pleuritic pain, Other      Endocrine:  Denies: Polydipsia, Polyuria, Heat/cold intolerance, Diabetes, Other      Eyes:  Denies: Blurred vision, Vision Changes, Other      Ears, nose, mouth, throat:  Denies: Mouth lesions, Thrush, Throat pain,     Hoarseness, Allergies/Hay Fever, Post Nasal Drip,  Headaches, Recent Head Injury,    Nose Bleeding, Neck Stiffness, Thyroid Mass, Hearing Loss, Ear Fullness, Dry     Mouth, Nasal or Sinus Pain, Dry Lips, Nasal discharge, Nasal congestion, Other      Cardiovascular:  Denies: Palpitations, Syncope, Claudication, Chest Pain, Wake     up Gasping for air, Leg Swelling, Irregular Heart Rate, Cyanosis, Dyspnea on     Exertion, Other      Gastrointestinal:  Denies: Nausea, Constipation, Diarrhea, Abdominal pain,     Vomiting, Difficulty Swallowing, Reflux/Heartburn, Dysphagia, Jaundice,     Bloating, Melena, Bloody stools, Other      Genitourinary:  Denies: Urinary frequency, Incontinence, Hematuria, Urgency,     Nocturia, Dysuria, Testicular problems, Other      Musculoskeletal:  Denies: Joint Pain, Joint Stiffness, Joint Swelling, Myalgias,    Other      Hematologic/lymphatic:  DENIES: Lymphadenopathy, Bruising, Bleeding tendencies,     Other      Neurological:  Denies: Headache, Numbness, Weakness, Seizures, Other      Psychiatric:  Denies: Anxiety, Appropriate Effect, Depression, Other      Sleep:  No: Excessive daytime sleep, Morning Headache?, Snoring, Insomnia?, Stop    breathing at sleep?, Other      Integumentary:  Denies: Rash, Dry skin, Skin Warm to Touch, Other      Immunologic/Allergic:  Denies: Latex allergy, Seasonal allergies, Asthma,     Urticaria, Eczema, Other      Immunization status:  No: Up to date            FAMILY/SOCIAL/MEDICAL HX      Stroke - Family Hx:  Mother      Heart - Family Hx:  Mother      Diabetes - Family Hx:  Mother      Is Father Still Living?:  No      Is Mother Still Living?:  No      Social History:  Tobacco Use, Alcohol Use      Smoking status:  Current every day smoker (1.5 ppd x 40 y )      Anticoagulation Therapy:  Yes      Antibiotic Prophylaxis:  No      Medical History:  Yes: Seizures (2009), Heart Attack (disease ),     Hemorrhoids/Rectal Prob (acid reflux), Hiatal Hernia, High Blood Pressure,     Stroke; No: Blood Disease,  Chemotherapy/Cancer, Deafness or Ringing Ears,     Shortness Of Breath, Sinus Trouble      Psychiatric History      None            PREVENTION      Hx Influenza Vaccination:  Yes      Date Influenza Vaccine Given:  Aug 1, 2016      Influenza Vaccine Declined:  Yes      2 or More Falls Past Year?:  No      Fall Past Year with Injury?:  No      Hx Pneumococcal Vaccination:  Yes      Encouraged to follow-up with:  PCP regarding preventative exams.      Chart initiated by      Pebbles Graham MA            ALLERGIES/MEDICATIONS      Allergies:        Coded Allergies:             NO KNOWN DRUG ALLERGIES (Verified  Allergy, Mild, 9/18/18)      Medications    Last Reconciled on 9/18/18 09:43 by DAREK MCCALLUM MD      Umeclidinium/Vilanterol 62.5-25 Mcg Inh (Anoro Ellipta 62.5-25 Mcg Inh) 1 Each     Blst.w.dev      1 PUFF INH QDAY, #1 INH 6 Refills         Prov: Darek Mccallum         6/8/18       MDI-Albuterol (Proair HFA) 8.5 Gm Hfa.aer.ad      2 PUFFS INH Q6H PRN for SHORTNESS OF BREATH, #1 MDI 6 Refills         Prov: Darek Mccallum         11/28/17       Metoprolol Succinate (Metoprolol Succinate *) 100 Mg Tab.sr.24h      200 MG PO QDAY, #30 TAB.SR.24H         Reported         2/23/16       Lisinopril* (Lisinopril*) 5 Mg Tablet      5 MG PO QDAY, TAB 0 Refills         Reported         5/1/15       Apixaban (Eliquis) 5 Mg Tab      5 MG PO BID, TAB         Reported         5/1/15       levETIRAcetam (levETIRAcetam) 500 Mg Tablet      1000 MG PO BID, TAB         Reported         5/1/15       Simvastatin (Simvastatin*) 10 Mg Tablet      10 MG PO HS, TAB 0 Refills         Reported         5/1/15       Aspirin (Aspirin*) 81 Mg Tab.chew      81 MG PO QDAY, TAB.CHEW 0 Refills         Reported         5/1/15       Omega 3 Polyunsat Fatty Acids (Lovaza) 1 Gm Capsule      1 GM PO BID, CAP         Reported         5/1/15       Triamterene/HCTZ 37.5/25 Mg (Triamterene/HCTZ 37.5/25 Mg) 1 Tab Tablet      0.5 TAB PO QAM, TAB 0 Refills          Reported         5/1/15       Potassium Chloride (K-Dur*) 10 Meq Tab.prt.sr      20 MEQ PO DAILY, 0 Refills         Reported         10/4/09      Current Medications      Current Medications Reviewed 9/18/18            EXAM      GEN-patient appears stated age resting comfortable in no acute distress      Eyes-PERRL,  conjunctiva are normal in appearance extraocular muscles are     intact, no scleral icterus      Lymphatic-no swollen or enlarged cervical nodes, or axillary node, or femoral     nodes, or supraclavicular nodes      Mouth normal dentition, no erythema no ulcerations oropharynx appears normal no     exudate no evidence of postnasal drip,       Neck-there are no palpable supraclavicular or cervical adenopathy, thyroid is     normal in appearance no apparent nodules, there is no inspiratory or expiratory     stridor      Respiratory-patient exhibits normal work of breathing, speaking in full     sentences without difficulty, the chest is normal in appearance, clear to     auscultation with no wheezes rales or rhonchi, chest is normal to percussion on     both the right and left sides      Cardiovascular-the heart rate is normal and regular S1 and S2 present with no     murmur or extra heart sounds, there is no JVD or pedal edema present      GI-the abdomen is normal in appearance, bowel sounds present and normal in all     quadrants no hepatosplenomegaly or masses felt      Extremities-no clubbing is present, pulses present in all extremities, capillary    refill time is normal      Skin-skin is normal in appearance it is warm and dry, no rashes present, no     evidence of cyanosis, palpation reveals no masses      Neurological-the patient is alert and oriented to time place and person, moves     all 4 extremities, normal gait, normal affect and mood, CN2-12 intact      Psych-normal judgment and insight is good, normal mood and affect, alert and     oriented to person, place, and time, and date       Vtials      Vitals:             Height 5 ft 10 in / 177.8 cm           Weight 268 lbs 8 oz / 121.688034 kg           BSA 2.50 m2           BMI 38.5 kg/m2           Temperature 98.3 F / 36.83 C - Oral           Pulse 76           Respirations 16           Blood Pressure 127/95 Sitting, Right Arm           Pulse Oximetry 95%, Room air            REVIEW      Results Reviewed      PCCS Results Reviewed?:  Yes Prev Lab Results, Yes Prev Radiology Results, Yes     Previous [facility] Records            Assessment      Pulmonary artery aneurysm - I28.1            Notes      New Medications      * Umeclidinium/Vilanterol 62.5-25 Mcg Inh (Anoro Ellipta 62.5-25 Mcg Inh) 1 EACH      BLST.W.DEV: 1 PUFF INH QDAY #1      * MDI-Albuterol (Ventolin HFA*) 18 GM HFA.AER.AD: 2 PUFFS INH Q4H PRN SHORTNESS       OF BREATH #1      New Diagnostics      * T SPOT TB TEST, Routine         Dx: Pulmonary artery aneurysm - I28.1      New Office Procedures      * Flu Vaccine Quadrivalent, As Soon As Possible         FLU VACC [id number] 36MOS UP/PF (Fluzone Quadrivalent [id number] Syringe) 60        MCG/0.5 ML SYRINGE:         60 MICROGRAM INTRAMUSCULARLY Qty 1 SYRINGE      ASSESSMENT/PLAN:      1. Pulmonary artery aneurysm,stable on most recent CT of the chest, Will check     T-spot most likely caused his COPD.       2. Chronic obstructive pulmonary disease with centrilobular emphysema. Continue     Stiolto and albuterol.  I stressed the importance of smoking cessation.       3. Tobacco abuse with active cigarette smoking. I discussed the importance of     smoking cessation with the patient today. He is not interested in     pharmacological intervention. Time spent discussing smoking cessation with the     patient today 6 minutes.       4. Dyspnea, improved.       5. Obstructive sleep apnea. Continue PAP therapy at current settings.       6. Pulmonary nodule. CT scan as above.       7. I have personally reviewed laboratory data, imaging as well as  previous     medical records.            Patient Education      Education resources provided:  Yes      Patient Education Provided:  COPD, Smoking Cessation                 Disclaimer: Converted document may not contain table formatting or lab diagrams. Please see [employer] for the authenticated document.

## 2021-06-05 NOTE — PROGRESS NOTES
"   Progress Note      Patient Name: Sacha Arzola   Patient ID: [id number]   Sex: Male   YOB: 1955    Primary Care Provider: Ifeoma CLEARY   Referring Provider: Kalin Ch MD    Visit Date: May 6, 2021    Provider: Kalin Ch MD   Location: [facility]   Location Address: [address] [place] [address], [address]   [place]  [id number]   Location Phone: [phone]          Chief Complaint  · Atrial fibrillation       History Of Present Illness  REFERRING CARE PROVIDER: Ifeoma CLEARY   Sacha Arzola is a 66 year old gentleman with known chronic atrial fibrillation, diastolic CHF, COPD, hypertension, and previous pulmonary artery aneurysm who has been doing well. The patient has had stable shortness of breath with exertion issues. He does continue to smoke. Denies any new chest pain. Patient's weight is down 11 pounds.   PAST MEDICAL HISTORY: COPD; CVA; Chronic atrial fibrillation; Congestive heart failure, diastolic; Hyperlipidemia; Hypertension; Pulmonary artery aneurysm; Seizure disorder.   PSYCHOSOCIAL HISTORY: Daily alcohol use. Currently smokes 1-1/2 packs per day.   CURRENT MEDICATIONS: Medications have been reviewed and are as stated.      ALLERGIES:  No known drug allergies.       Review of Systems  · Cardiovascular  o Admits  o : shortness of breath while walking or lying flat  o Denies  o : palpitations (fast, fluttering, or skipping beats), swelling (feet, ankles, hands), chest pain or angina pectoris   · Respiratory  o Denies  o : chronic or frequent cough      Vitals  Date Time BP Position Site L\R Cuff Size HR RR TEMP (F) WT  HT  BMI kg/m2 BSA m2 O2 Sat FR L/min FiO2 HC       05/06/2021 10:24 /70 Sitting    62 - R   222lbs 0oz 5'  10\" 31.85 2.23             Physical Examination  · Constitutional  o Appearance  o : Awake, alert, in no acute distress.   · Eyes  o Conjunctivae  o : Normal.  · Ears, Nose, Mouth and Throat  o Oral Cavity  o : "   § Oral Mucosa  § : Normal.  · Neck  o Inspection/Palpation  o : No JVD. Good carotid upstroke. No thyromegaly.  · Respiratory  o Respiratory  o : Mildly diminished breath sounds bilaterally.  · Cardiovascular  o Heart  o :   § Auscultation of Heart  § : Irregularly irregular.  o Peripheral Vascular System  o :   § Extremities  § : Good femoral and pedal pulses. No pedal edema.  · Gastrointestinal  o Abdominal Examination  o : Soft. No tenderness or masses felt. No hepatosplenomegaly. Abdominal aorta is not palpable.          Assessment     ASSESSMENT AND PLAN:  1.  Atrial fibrillation, chronic but rate controlled. Patient on Eliquis for CVA prevention.  2.  Diastolic congestive heart failure. Patient with no weight gain or increased edema/shortness of breath        issues. Continue with his  current dose of Lasix 20 mg once a day along with potassium supplementation.        Check labs next visit.  3.  CVA. Patient on chronic aspirin as well as intensive statin therapy. Goal of  LDL less than 70. Tolerating        well.   5.  Smoking. Counseled on the importance of cessation and  offered both Chantix and Wellbutrin. Spent over 3        minutes counseling.      Kalin Ch MD  JH/pap                Electronically Signed by: Carlotta Du-, Other -Author on May 8, 2021 08:04:42 AM  Electronically Co-signed by: Kalin Ch MD -Reviewer on May 10, 2021 11:03:58 AM

## 2021-07-06 RX ORDER — APIXABAN 5 MG/1
TABLET, FILM COATED ORAL
Qty: 180 TABLET | Refills: 3 | Status: SHIPPED | OUTPATIENT
Start: 2021-07-06 | End: 2021-12-21 | Stop reason: SDUPTHER

## 2021-07-15 VITALS
BODY MASS INDEX: 31.78 KG/M2 | WEIGHT: 222 LBS | HEIGHT: 70 IN | DIASTOLIC BLOOD PRESSURE: 70 MMHG | HEART RATE: 62 BPM | SYSTOLIC BLOOD PRESSURE: 108 MMHG

## 2021-07-21 RX ORDER — POTASSIUM CHLORIDE 750 MG/1
TABLET, FILM COATED, EXTENDED RELEASE ORAL
Qty: 180 TABLET | Refills: 2 | Status: SHIPPED | OUTPATIENT
Start: 2021-07-21 | End: 2021-12-21 | Stop reason: SDUPTHER

## 2021-09-10 ENCOUNTER — OFFICE VISIT (OUTPATIENT)
Dept: SLEEP MEDICINE | Facility: HOSPITAL | Age: 66
End: 2021-09-10

## 2021-09-10 VITALS
BODY MASS INDEX: 31.5 KG/M2 | TEMPERATURE: 94.3 F | WEIGHT: 220 LBS | DIASTOLIC BLOOD PRESSURE: 60 MMHG | HEIGHT: 70 IN | SYSTOLIC BLOOD PRESSURE: 83 MMHG | OXYGEN SATURATION: 99 % | HEART RATE: 98 BPM

## 2021-09-10 DIAGNOSIS — E66.9 OBESITY WITH SERIOUS COMORBIDITY, UNSPECIFIED CLASSIFICATION, UNSPECIFIED OBESITY TYPE: ICD-10-CM

## 2021-09-10 DIAGNOSIS — J44.9 CHRONIC OBSTRUCTIVE PULMONARY DISEASE, UNSPECIFIED COPD TYPE (HCC): ICD-10-CM

## 2021-09-10 DIAGNOSIS — G47.33 OBSTRUCTIVE SLEEP APNEA: Primary | ICD-10-CM

## 2021-09-10 PROCEDURE — G0463 HOSPITAL OUTPT CLINIC VISIT: HCPCS

## 2021-09-10 PROCEDURE — 99214 OFFICE O/P EST MOD 30 MIN: CPT | Performed by: FAMILY MEDICINE

## 2021-09-10 NOTE — PROGRESS NOTES
Follow Up [facility] Disorders [facility] Note     Chief Complaint:  KATHARINE     Primary Care Physician: Ifeoma Faria APRN Richard Frandy Arzola is a 66 y.o.male  was last seen at [facility] sleep lab: 11/12/2020.  Patient has obstructive sleep apnea with underlying COPD.  Uses a CPAP with a full facemask.  Currently on auto CPAP 12-20 cm H2O.  Sleep study from March 2016 shows overall AHI of 134 with hypoxemia.  Per records it was controlled on CPAP of 12.  Today patient reports he presents because machine is broken beyond repair.  Unable to pull data.  Not working for him at all at home.  ESS: 4.    Current Medications:    Current Outpatient Medications:   •  Eliquis 5 MG tablet tablet, TAKE 1 TABLET TWICE A DAY, Disp: 180 tablet, Rfl: 3  •  potassium chloride 10 MEQ CR tablet, TAKE TWO TABLETS BY MOUTH EVERY DAY, Disp: 180 tablet, Rfl: 2   also entered in Sleep Questionnaire    Patient  has a past medical history of CHF (congestive heart failure) (CMS/[facility]) (12/05/2019), COPD (chronic obstructive pulmonary disease) (CMS/[facility]) (12/05/2019), CVA (cerebral vascular accident) (CMS/[facility]) (12/05/2019), Essential hypertension (12/05/2019), Fatigue (12/05/2019), HLD (hyperlipidemia) (12/05/2019), Peripheral vision loss (12/05/2019), Positive colorectal cancer screening using Cologuard test, and Seizures (CMS/HCC).    Social History:    Social History     Socioeconomic History   • Marital status:      Spouse name: Not on file   • Number of children: Not on file   • Years of education: Not on file   • Highest education level: Not on file   Tobacco Use   • Smoking status: Current Every Day Smoker     Packs/day: 1.50       Allergies:  Patient has no known allergies.    Review of Systems:    A complete review of systems was done and all were negative with the exception of shortness of breath wheezing; history of COPD    Vital Signs:    Vitals:    09/10/21 1100   BP: (!) 83/60   Pulse: 98   Temp: 94.3 °F (34.6 °C)   SpO2: 99%  "  Weight: 99.8 kg (220 lb)   Height: 177.8 cm (70\")     Body mass index is 31.57 kg/m².    Vital Signs BP (!) 83/60   Pulse 98   Temp 94.3 °F (34.6 °C)   Ht 177.8 cm (70\")   Wt 99.8 kg (220 lb)   SpO2 99%   BMI 31.57 kg/m²  Body mass index is 31.57 kg/m².    General Alert and oriented. No acute distress noted   Pharynx/Throat Class IV Mallampati airway, large tongue, no evidence of redundant lateral pharyngeal tissue. No oral lesions. No thrush. Moist mucous membranes.   Head Normocephalic. Symmetrical. Atraumatic.    Nose No septal deviation. No drainage   Chest Wall Normal shape. Symmetric expansion with respiration. No tenderness.   Neck Trachea midline, no thyromegaly or adenopathy    Lungs Clear to auscultation bilaterally. No wheezes. No rhonchi. No rales. Respirations regular, even and unlabored.   Heart Regular rhythm and normal rate. Normal S1 and S2. No murmur   Abdomen Soft, non-tender and non-distended. Normal bowel sounds. No masses.   Extremities Moves all extremities well. No edema   Psychiatric Normal mood and affect.     Impression:  1. Obstructive sleep apnea    2. Chronic obstructive pulmonary disease, unspecified COPD type (CMS/HCC)    3. Obesity with serious comorbidity, unspecified classification, unspecified obesity type        CPAP machine is broken beyond repair.  We will place order for new auto CPAP with same settings of 12-20 cm H2O.  Return to clinic after least 1 month use of Pap for follow-up or sooner if needed.    Still smoking cigarettes; will think about quitting.    Patient uses the CPAP device and benefits from its use in terms of reduction of hypersomnia and snoring.Weight loss will be strongly beneficial to reduce the severity of sleep-disordered breathing. Body mass index is 31.57 kg/m².  Caution during activities that require prolonged concentration is strongly advised if sleepiness returns. Changing of PAP supplies regularly is important for effective use. Patient " needs to change cushion on the mask or plugs on nasal pillows along with disposable filters once every month and change mask frame, tubing, headgear and Velcro straps every 6 months at the minimum.    Blood pressure low today 83/60.  Denies lightheadedness or dizziness.  Heart rate of 98.  Denies chest pain.  Has not had breakfast today and did not sleep very well last night due to anxiety about doctor's appointment.  Has blood pressure monitor at home.  Advised patient to go home eat little breakfast and check his blood pressure.  If continues to run low or if he becomes symptomatic call your cardiologist or go to the ER.  Patient expressed understanding and agreeable to plan.    Time spent during visit: 20 minutes of which at least 50% of the time was spent counseling patient.    Binh Morrow MD  Sleep Medicine  09/10/21  11:59 EDT

## 2021-09-22 RX ORDER — LEVETIRACETAM 500 MG/1
TABLET ORAL
Qty: 120 TABLET | Refills: 0 | Status: SHIPPED | OUTPATIENT
Start: 2021-09-22 | End: 2021-10-11 | Stop reason: SDUPTHER

## 2021-09-23 ENCOUNTER — HOSPITAL ENCOUNTER (OUTPATIENT)
Dept: CT IMAGING | Facility: HOSPITAL | Age: 66
Discharge: HOME | End: 2021-09-23
Admitting: NURSE PRACTITIONER

## 2021-09-23 DIAGNOSIS — R91.1 PULMONARY NODULE: ICD-10-CM

## 2021-09-23 DIAGNOSIS — R91.8 LUNG NODULES: Primary | ICD-10-CM

## 2021-09-23 PROCEDURE — 71250 CT THORAX DX C-: CPT

## 2021-10-11 ENCOUNTER — OFFICE VISIT (OUTPATIENT)
Dept: FAMILY MEDICINE CLINIC | Facility: CLINIC | Age: 66
End: 2021-10-11

## 2021-10-11 VITALS
TEMPERATURE: 97 F | OXYGEN SATURATION: 96 % | HEIGHT: 70 IN | BODY MASS INDEX: 31.87 KG/M2 | SYSTOLIC BLOOD PRESSURE: 110 MMHG | RESPIRATION RATE: 24 BRPM | DIASTOLIC BLOOD PRESSURE: 64 MMHG | WEIGHT: 222.6 LBS | HEART RATE: 88 BPM

## 2021-10-11 DIAGNOSIS — H91.90 HEARING LOSS, UNSPECIFIED HEARING LOSS TYPE, UNSPECIFIED LATERALITY: ICD-10-CM

## 2021-10-11 DIAGNOSIS — R56.9 SEIZURES (HCC): ICD-10-CM

## 2021-10-11 DIAGNOSIS — I10 ESSENTIAL HYPERTENSION: ICD-10-CM

## 2021-10-11 DIAGNOSIS — R19.5 POSITIVE COLORECTAL CANCER SCREENING USING COLOGUARD TEST: ICD-10-CM

## 2021-10-11 DIAGNOSIS — I63.9 CEREBROVASCULAR ACCIDENT (CVA), UNSPECIFIED MECHANISM (HCC): Primary | ICD-10-CM

## 2021-10-11 DIAGNOSIS — R53.83 OTHER FATIGUE: ICD-10-CM

## 2021-10-11 DIAGNOSIS — J44.9 CHRONIC OBSTRUCTIVE PULMONARY DISEASE, UNSPECIFIED COPD TYPE (HCC): ICD-10-CM

## 2021-10-11 DIAGNOSIS — I50.9 CONGESTIVE HEART FAILURE, UNSPECIFIED HF CHRONICITY, UNSPECIFIED HEART FAILURE TYPE (HCC): ICD-10-CM

## 2021-10-11 DIAGNOSIS — F17.219 CIGARETTE NICOTINE DEPENDENCE WITH NICOTINE-INDUCED DISORDER: ICD-10-CM

## 2021-10-11 DIAGNOSIS — Z23 NEED FOR INFLUENZA VACCINATION: ICD-10-CM

## 2021-10-11 DIAGNOSIS — Z51.81 MEDICATION MONITORING ENCOUNTER: ICD-10-CM

## 2021-10-11 DIAGNOSIS — Z12.5 SCREENING FOR PROSTATE CANCER: ICD-10-CM

## 2021-10-11 DIAGNOSIS — E78.2 MIXED HYPERLIPIDEMIA: ICD-10-CM

## 2021-10-11 DIAGNOSIS — H53.453 DECREASED PERIPHERAL VISION OF BOTH EYES: ICD-10-CM

## 2021-10-11 PROBLEM — H53.459 PERIPHERAL VISION LOSS: Status: ACTIVE | Noted: 2019-12-05

## 2021-10-11 PROBLEM — E78.5 HYPERLIPIDEMIA: Status: ACTIVE | Noted: 2019-12-05

## 2021-10-11 LAB
ALBUMIN SERPL-MCNC: 3.7 G/DL (ref 3.5–5.2)
ALBUMIN/GLOB SERPL: 1 G/DL
ALP SERPL-CCNC: 103 U/L (ref 39–117)
ALT SERPL W P-5'-P-CCNC: 18 U/L (ref 1–41)
ANION GAP SERPL CALCULATED.3IONS-SCNC: 8.6 MMOL/L (ref 5–15)
AST SERPL-CCNC: 18 U/L (ref 1–40)
BASOPHILS # BLD AUTO: 0.11 10*3/MM3 (ref 0–0.2)
BASOPHILS NFR BLD AUTO: 1.4 % (ref 0–1.5)
BILIRUB SERPL-MCNC: 0.7 MG/DL (ref 0–1.2)
BUN SERPL-MCNC: 13 MG/DL (ref 8–23)
BUN/CREAT SERPL: 12.6 (ref 7–25)
CALCIUM SPEC-SCNC: 8.9 MG/DL (ref 8.6–10.5)
CHLORIDE SERPL-SCNC: 98 MMOL/L (ref 98–107)
CHOLEST SERPL-MCNC: 124 MG/DL (ref 0–200)
CO2 SERPL-SCNC: 28.4 MMOL/L (ref 22–29)
CREAT SERPL-MCNC: 1.03 MG/DL (ref 0.76–1.27)
DEPRECATED RDW RBC AUTO: 43.3 FL (ref 37–54)
EOSINOPHIL # BLD AUTO: 0.18 10*3/MM3 (ref 0–0.4)
EOSINOPHIL NFR BLD AUTO: 2.3 % (ref 0.3–6.2)
ERYTHROCYTE [DISTWIDTH] IN BLOOD BY AUTOMATED COUNT: 13.2 % (ref 12.3–15.4)
GFR SERPL CREATININE-BSD FRML MDRD: 72 ML/MIN/1.73
GLOBULIN UR ELPH-MCNC: 3.6 GM/DL
GLUCOSE SERPL-MCNC: 113 MG/DL (ref 65–99)
HCT VFR BLD AUTO: 56.6 % (ref 37.5–51)
HDLC SERPL-MCNC: 41 MG/DL (ref 40–60)
HGB BLD-MCNC: 19.8 G/DL (ref 13–17.7)
IMM GRANULOCYTES # BLD AUTO: 0.02 10*3/MM3 (ref 0–0.05)
IMM GRANULOCYTES NFR BLD AUTO: 0.3 % (ref 0–0.5)
LDLC SERPL CALC-MCNC: 69 MG/DL (ref 0–100)
LDLC/HDLC SERPL: 1.7 {RATIO}
LYMPHOCYTES # BLD AUTO: 2.33 10*3/MM3 (ref 0.7–3.1)
LYMPHOCYTES NFR BLD AUTO: 30.1 % (ref 19.6–45.3)
MCH RBC QN AUTO: 31.6 PG (ref 26.6–33)
MCHC RBC AUTO-ENTMCNC: 35 G/DL (ref 31.5–35.7)
MCV RBC AUTO: 90.3 FL (ref 79–97)
MONOCYTES # BLD AUTO: 0.38 10*3/MM3 (ref 0.1–0.9)
MONOCYTES NFR BLD AUTO: 4.9 % (ref 5–12)
NEUTROPHILS NFR BLD AUTO: 4.71 10*3/MM3 (ref 1.7–7)
NEUTROPHILS NFR BLD AUTO: 61 % (ref 42.7–76)
NRBC BLD AUTO-RTO: 0 /100 WBC (ref 0–0.2)
PLATELET # BLD AUTO: 208 10*3/MM3 (ref 140–450)
PMV BLD AUTO: 11 FL (ref 6–12)
POTASSIUM SERPL-SCNC: 3.8 MMOL/L (ref 3.5–5.2)
PROT SERPL-MCNC: 7.3 G/DL (ref 6–8.5)
PSA SERPL-MCNC: 0.68 NG/ML (ref 0–4)
RBC # BLD AUTO: 6.27 10*6/MM3 (ref 4.14–5.8)
SODIUM SERPL-SCNC: 135 MMOL/L (ref 136–145)
TRIGL SERPL-MCNC: 66 MG/DL (ref 0–150)
TSH SERPL DL<=0.05 MIU/L-ACNC: 2.05 UIU/ML (ref 0.27–4.2)
VLDLC SERPL-MCNC: 14 MG/DL (ref 5–40)
WBC # BLD AUTO: 7.73 10*3/MM3 (ref 3.4–10.8)

## 2021-10-11 PROCEDURE — 99214 OFFICE O/P EST MOD 30 MIN: CPT | Performed by: NURSE PRACTITIONER

## 2021-10-11 PROCEDURE — 80053 COMPREHEN METABOLIC PANEL: CPT | Performed by: NURSE PRACTITIONER

## 2021-10-11 PROCEDURE — 36415 COLL VENOUS BLD VENIPUNCTURE: CPT | Performed by: NURSE PRACTITIONER

## 2021-10-11 PROCEDURE — G0008 ADMIN INFLUENZA VIRUS VAC: HCPCS | Performed by: NURSE PRACTITIONER

## 2021-10-11 PROCEDURE — G0103 PSA SCREENING: HCPCS | Performed by: NURSE PRACTITIONER

## 2021-10-11 PROCEDURE — 90662 IIV NO PRSV INCREASED AG IM: CPT | Performed by: NURSE PRACTITIONER

## 2021-10-11 PROCEDURE — 85025 COMPLETE CBC W/AUTO DIFF WBC: CPT | Performed by: NURSE PRACTITIONER

## 2021-10-11 PROCEDURE — 84443 ASSAY THYROID STIM HORMONE: CPT | Performed by: NURSE PRACTITIONER

## 2021-10-11 PROCEDURE — 80061 LIPID PANEL: CPT | Performed by: NURSE PRACTITIONER

## 2021-10-11 RX ORDER — ASPIRIN 81 MG/1
81 TABLET ORAL DAILY
COMMUNITY
Start: 2021-09-24 | End: 2022-03-15

## 2021-10-11 RX ORDER — TRIAMTERENE AND HYDROCHLOROTHIAZIDE 37.5; 25 MG/1; MG/1
TABLET ORAL
COMMUNITY
Start: 2021-05-25 | End: 2021-12-21 | Stop reason: SDUPTHER

## 2021-10-11 RX ORDER — ALBUTEROL SULFATE 90 UG/1
AEROSOL, METERED RESPIRATORY (INHALATION)
COMMUNITY
End: 2021-12-21

## 2021-10-11 RX ORDER — SIMVASTATIN 10 MG
10 TABLET ORAL
COMMUNITY
Start: 2021-09-24 | End: 2021-12-21 | Stop reason: SDUPTHER

## 2021-10-11 RX ORDER — LISINOPRIL 5 MG/1
5 TABLET ORAL DAILY
COMMUNITY
Start: 2021-09-24 | End: 2021-12-21 | Stop reason: SDUPTHER

## 2021-10-11 RX ORDER — METOPROLOL SUCCINATE 200 MG/1
TABLET, EXTENDED RELEASE ORAL
COMMUNITY
Start: 2021-05-25 | End: 2021-12-21 | Stop reason: SDUPTHER

## 2021-10-11 RX ORDER — OMEGA-3-ACID ETHYL ESTERS 1 G/1
1 CAPSULE, LIQUID FILLED ORAL 2 TIMES DAILY
COMMUNITY
Start: 2021-09-24 | End: 2022-03-15

## 2021-10-11 RX ORDER — LEVETIRACETAM 500 MG/1
1000 TABLET ORAL 2 TIMES DAILY
Qty: 360 TABLET | Refills: 1 | Status: SHIPPED | OUTPATIENT
Start: 2021-10-11 | End: 2022-03-15

## 2021-10-11 NOTE — PROGRESS NOTES
Chief Complaint  Follow-up, Med Refill, and Seizures    Subjective      History of Present Illness  Sacha Arzola presents to [facility]    He is here with his daughter today for medication refills on the Keppra.  He no longer drives himself.  He is doing well overall, has no complaints.    Hypertension and CHF, hyperlipidemia, managed by cardiology.    COPD with smoking 1-1/2 packs/day.  No interest in cessation.  He sees pulmonology.    History of CVA with peripheral vision loss.  He denies any recent seizures.  He is unsure if he ever had seizures.  He had the stroke at [employer] game he says.    KATHARINE, on CPAP.    Positive Cologuard test but refuses any further follow-up.  He understands the risks.    He is vaccinated against Covid.  With [employer].    He is agreeable to a high risk flu shot today.    Past Medical History:   • CHF (congestive heart failure) (HCC)   • COPD (chronic obstructive pulmonary disease) (HCC)   • CVA (cerebral vascular accident) (HCC)   • Essential hypertension   • Fatigue   • HLD (hyperlipidemia)   • Peripheral vision loss   • Positive colorectal cancer screening using Cologuard test   • Seizures (HCC)       Allergies  Patient has no known allergies.    No past surgical history on file.    Social History     Tobacco Use   • Smoking status: Current Every Day Smoker     Packs/day: 1.50     Years: 47.00     Pack years: 70.50     Types: Cigarettes     Start date: 1974   • Smokeless tobacco: Never Used   Substance Use Topics   • Alcohol use: Not on file   • Drug use: Not on file       No family history on file.     Health Maintenance Due   Topic Date Due   • Pneumococcal Vaccine 65+ (1 of 2 - PPSV23) Never done   • TDAP/TD VACCINES (1 - Tdap) Never done   • ZOSTER VACCINE (1 of 2) Never done   • INFLUENZA VACCINE  Never done   • HEPATITIS C SCREENING  Never done   • ANNUAL WELLNESS VISIT  Never done   • LIPID PANEL  09/16/2021          Current  Outpatient Medications:   •  albuterol sulfate HFA (Ventolin HFA) 108 (90 Base) MCG/ACT inhaler, Ventolin HFA 90 mcg/actuation inhalation HFA aerosol inhaler inhale 1 puff (90 mcg) by inhalation route every 6 hours as needed   Active, Disp: , Rfl:   •  Aspirin Adult Low Strength 81 MG EC tablet, Take 81 mg by mouth Daily., Disp: , Rfl:   •  Eliquis 5 MG tablet tablet, TAKE 1 TABLET TWICE A DAY, Disp: 180 tablet, Rfl: 3  •  levETIRAcetam (KEPPRA) 500 MG tablet, Take 2 tablets by mouth 2 (Two) Times a Day., Disp: 360 tablet, Rfl: 1  •  lisinopril (PRINIVIL,ZESTRIL) 5 MG tablet, Take 5 mg by mouth Daily., Disp: , Rfl:   •  metoprolol succinate XL (TOPROL-XL) 200 MG 24 hr tablet, metoprolol succinate 200 mg oral tablet extended release 24 hr TAKE ONE TABLET BY MOUTH ONCE DAILY 5/25/2021  Active, Disp: , Rfl:   •  omega-3 acid ethyl esters (LOVAZA) 1 g capsule, Take 1 g by mouth 2 (Two) Times a Day., Disp: , Rfl:   •  potassium chloride 10 MEQ CR tablet, TAKE TWO TABLETS BY MOUTH EVERY DAY, Disp: 180 tablet, Rfl: 2  •  simvastatin (ZOCOR) 10 MG tablet, Take 10 mg by mouth Daily With Breakfast., Disp: , Rfl:   •  triamterene-hydrochlorothiazide (MAXZIDE-25) 37.5-25 MG per tablet, triamterene-hydrochlorothiazid 37.5-25 mg oral tablet TAKE 1/2 TABLET BY MOUTH DAILY 5/25/2021  Active, Disp: , Rfl:   •  umeclidinium-vilanterol (ANORO ELLIPTA) 62.5-25 MCG/INH aerosol powder  inhaler, Inhale 1 puff Daily., Disp: , Rfl:     Immunization History   Administered Date(s) Administered   • COVID-19 (ARMANDO) 06/16/2021       Objective     Vitals:    10/11/21 0824   BP: 110/64   Pulse:    Resp:    Temp:    SpO2:      Body mass index is 31.94 kg/m².     Review of Systems    Physical Exam  Vitals reviewed.   Constitutional:       Appearance: Normal appearance. He is well-developed.   HENT:      Right Ear: Tympanic membrane and ear canal normal.      Left Ear: Tympanic membrane and ear canal normal.   Cardiovascular:      Rate and Rhythm:  Normal rate and regular rhythm.      Heart sounds: Normal heart sounds. No murmur heard.      Pulmonary:      Effort: Pulmonary effort is normal.      Comments: Diminished breath sounds in bilateral bases  Neurological:      Mental Status: He is alert and oriented to person, place, and time.      Cranial Nerves: No cranial nerve deficit.      Motor: No weakness.   Psychiatric:         Mood and Affect: Mood and affect normal.             Result Review :    The following data was reviewed by: EVIN Florez on 10/11/2021:       Depression: Not at risk   • PHQ-2 Score: 0       Common labs    Common Labsle 4/6/21   Glucose 99   BUN 13   Creatinine 1.16   Sodium 141   Potassium 4.1   Chloride 99   Calcium 9.7   Albumin 4.2   Total Bilirubin 0.68   Alkaline Phosphatase 119   AST (SGOT) 21   ALT (SGPT) 23   WBC 8.80   Hemoglobin 19.8 (A)   Hematocrit 61.4 (A)   Platelets 222   Total Cholesterol 121   Triglycerides 60   HDL Cholesterol 49   LDL Cholesterol  60 (A)   PSA 0.76   (A) Abnormal value       Comments are available for some flowsheets but are not being displayed.                           Assessment and Plan     Diagnoses and all orders for this visit:    1. Cerebrovascular accident (CVA), unspecified mechanism (HCC) (Primary)  Comments:  Continue lipid-lowering, BP management, and encouraged tobacco cessation    2. Seizures (HCC)  Comments:  No recent seizures  Orders:  -     levETIRAcetam (KEPPRA) 500 MG tablet; Take 2 tablets by mouth 2 (Two) Times a Day.  Dispense: 360 tablet; Refill: 1    3. Congestive heart failure, unspecified HF chronicity, unspecified heart failure type (HCC)  Comments:  Well-controlled currently    4. Essential hypertension  Comments:  BP to target with current medication regimen    5. Mixed hyperlipidemia  -     Lipid Panel    6. Other fatigue  Comments:  Check labs and call with results  Orders:  -     TSH    7. Chronic obstructive pulmonary disease, unspecified COPD type  "([facility])  Comments:  States his breathing is \"the same.\"  Orders:  -     CBC & Differential    8. Need for influenza vaccination    9. Medication monitoring encounter  -     Comprehensive Metabolic Panel    10. Screening for prostate cancer  -     PSA Screen    11. Hearing loss, unspecified hearing loss type, unspecified laterality    12. Cigarette nicotine dependence with nicotine-induced disorder    13. Decreased peripheral vision of both eyes    14. Positive colorectal cancer screening using Cologuard test  Comments:  Unwilling to do any further follow-up    Other orders  -     Fluzone High-Dose 65+yrs ([id number])            Follow Up     Return in about 1 month (around 11/11/2021) for Medicare Wellness- initial.    Patient was given instructions and counseling regarding his condition or for health maintenance advice. Please see specific information pulled into the AVS if appropriate.     Sacha Crawleychner  reports that he has been smoking cigarettes. He started smoking about 47 years ago. He has a 70.50 pack-year smoking history. He has never used smokeless tobacco.. I have educated him on the risk of diseases from using tobacco products such as cancer, COPD and heart disease.     I advised him to quit and he is not willing to quit.    I spent 3  minutes counseling the patient.           Ifeoma Hart, EVIN  "

## 2021-11-09 NOTE — PATIENT INSTRUCTIONS
Calorie Counting for Weight Loss  Calories are units of energy. Your body needs a certain number of calories from food to keep going throughout the day. When you eat or drink more calories than your body needs, your body stores the extra calories mostly as fat. When you eat or drink fewer calories than your body needs, your body burns fat to get the energy it needs.  Calorie counting means keeping track of how many calories you eat and drink each day. Calorie counting can be helpful if you need to lose weight. If you eat fewer calories than your body needs, you should lose weight. Ask your health care provider what a healthy weight is for you.  For calorie counting to work, you will need to eat the right number of calories each day to lose a healthy amount of weight per week. A dietitian can help you figure out how many calories you need in a day and will suggest ways to reach your calorie goal.  · A healthy amount of weight to lose each week is usually 1-2 lb (0.5-0.9 kg). This usually means that your daily calorie intake should be reduced by 500-750 calories.  · Eating 1,200-1,500 calories a day can help most women lose weight.  · Eating 1,500-1,800 calories a day can help most men lose weight.  What do I need to know about calorie counting?  Work with your health care provider or dietitian to determine how many calories you should get each day. To meet your daily calorie goal, you will need to:  · Find out how many calories are in each food that you would like to eat. Try to do this before you eat.  · Decide how much of the food you plan to eat.  · Keep a food log. Do this by writing down what you ate and how many calories it had.  To successfully lose weight, it is important to balance calorie counting with a healthy lifestyle that includes regular activity.  Where do I find calorie information?    The number of calories in a food can be found on a Nutrition Facts label. If a food does not have a Nutrition Facts  label, try to look up the calories online or ask your dietitian for help.  Remember that calories are listed per serving. If you choose to have more than one serving of a food, you will have to multiply the calories per serving by the number of servings you plan to eat. For example, the label on a package of bread might say that a serving size is 1 slice and that there are 90 calories in a serving. If you eat 1 slice, you will have eaten 90 calories. If you eat 2 slices, you will have eaten 180 calories.  How do I keep a food log?  After each time that you eat, record the following in your food log as soon as possible:  · What you ate. Be sure to include toppings, sauces, and other extras on the food.  · How much you ate. This can be measured in cups, ounces, or number of items.  · How many calories were in each food and drink.  · The total number of calories in the food you ate.  Keep your food log near you, such as in a pocket-sized notebook or on an shalonda or website on your mobile phone. Some programs will calculate calories for you and show you how many calories you have left to meet your daily goal.  What are some portion-control tips?  · Know how many calories are in a serving. This will help you know how many servings you can have of a certain food.  · Use a measuring cup to measure serving sizes. You could also try weighing out portions on a kitchen scale. With time, you will be able to estimate serving sizes for some foods.  · Take time to put servings of different foods on your favorite plates or in your favorite bowls and cups so you know what a serving looks like.  · Try not to eat straight from a food's packaging, such as from a bag or box. Eating straight from the package makes it hard to see how much you are eating and can lead to overeating. Put the amount you would like to eat in a cup or on a plate to make sure you are eating the right portion.  · Use smaller plates, glasses, and bowls for smaller  portions and to prevent overeating.  · Try not to multitask. For example, avoid watching TV or using your computer while eating. If it is time to eat, sit down at a table and enjoy your food. This will help you recognize when you are full. It will also help you be more mindful of what and how much you are eating.  What are tips for following this plan?  Reading food labels  · Check the calorie count compared with the serving size. The serving size may be smaller than what you are used to eating.  · Check the source of the calories. Try to choose foods that are high in protein, fiber, and vitamins, and low in saturated fat, trans fat, and sodium.  Shopping  · Read nutrition labels while you shop. This will help you make healthy decisions about which foods to buy.  · Pay attention to nutrition labels for low-fat or fat-free foods. These foods sometimes have the same number of calories or more calories than the full-fat versions. They also often have added sugar, starch, or salt to make up for flavor that was removed with the fat.  · Make a grocery list of lower-calorie foods and stick to it.  Cooking  · Try to cook your favorite foods in a healthier way. For example, try baking instead of frying.  · Use low-fat dairy products.  Meal planning  · Use more fruits and vegetables. One-half of your plate should be fruits and vegetables.  · Include lean proteins, such as chicken, turkey, and fish.  Lifestyle  Each week, aim to do one of the following:  · 150 minutes of moderate exercise, such as walking.  · 75 minutes of vigorous exercise, such as running.  General information  · Know how many calories are in the foods you eat most often. This will help you calculate calorie counts faster.  · Find a way of tracking calories that works for you. Get creative. Try different apps or programs if writing down calories does not work for you.  What foods should I eat?    · Eat nutritious foods. It is better to have a nutritious,  high-calorie food, such as an avocado, than a food with few nutrients, such as a bag of potato chips.  · Use your calories on foods and drinks that will fill you up and will not leave you hungry soon after eating.  ? Examples of foods that fill you up are nuts and nut butters, vegetables, lean proteins, and high-fiber foods such as whole grains. High-fiber foods are foods with more than 5 g of fiber per serving.  · Pay attention to calories in drinks. Low-calorie drinks include water and unsweetened drinks.  The items listed above may not be a complete list of foods and beverages you can eat. Contact a dietitian for more information.  What foods should I limit?  Limit foods or drinks that are not good sources of vitamins, minerals, or protein or that are high in unhealthy fats. These include:  · Candy.  · Other sweets.  · Sodas, specialty coffee drinks, alcohol, and juice.  The items listed above may not be a complete list of foods and beverages you should avoid. Contact a dietitian for more information.  How do I count calories when eating out?  · Pay attention to portions. Often, portions are much larger when eating out. Try these tips to keep portions smaller:  ? Consider sharing a meal instead of getting your own.  ? If you get your own meal, eat only half of it. Before you start eating, ask for a container and put half of your meal into it.  ? When available, consider ordering smaller portions from the menu instead of full portions.  · Pay attention to your food and drink choices. Knowing the way food is cooked and what is included with the meal can help you eat fewer calories.  ? If calories are listed on the menu, choose the lower-calorie options.  ? Choose dishes that include vegetables, fruits, whole grains, low-fat dairy products, and lean proteins.  ? Choose items that are boiled, broiled, grilled, or steamed. Avoid items that are buttered, battered, fried, or served with cream sauce. Items labeled as  crispy are usually fried, unless stated otherwise.  ? Choose water, low-fat milk, unsweetened iced tea, or other drinks without added sugar. If you want an alcoholic beverage, choose a lower-calorie option, such as a glass of wine or light beer.  ? Ask for dressings, sauces, and syrups on the side. These are usually high in calories, so you should limit the amount you eat.  ? If you want a salad, choose a garden salad and ask for grilled meats. Avoid extra toppings such as stoddard, cheese, or fried items. Ask for the dressing on the side, or ask for olive oil and vinegar or lemon to use as dressing.  · Estimate how many servings of a food you are given. Knowing serving sizes will help you be aware of how much food you are eating at restaurants.  Where to find more information  · Centers for Disease Control and Prevention: www.cdc.gov  · [place] Department of Agriculture: myplate.gov  Summary  · Calorie counting means keeping track of how many calories you eat and drink each day. If you eat fewer calories than your body needs, you should lose weight.  · A healthy amount of weight to lose per week is usually 1-2 lb (0.5-0.9 kg). This usually means reducing your daily calorie intake by 500-750 calories.  · The number of calories in a food can be found on a Nutrition Facts label. If a food does not have a Nutrition Facts label, try to look up the calories online or ask your dietitian for help.  · Use smaller plates, glasses, and bowls for smaller portions and to prevent overeating.  · Use your calories on foods and drinks that will fill you up and not leave you hungry shortly after a meal.  This information is not intended to replace advice given to you by your health care provider. Make sure you discuss any questions you have with your health care provider.  Document Revised: 01/28/2021 Document Reviewed: 01/28/2021  Elsevier Patient Education © 2021 [employer]

## 2021-11-09 NOTE — PROGRESS NOTES
The ABCs of the Annual Wellness Visit  Initial Medicare Wellness Visit    Chief Complaint   Patient presents with   • Welcome To Medicare     Subjective   History of Present Illness:  Sacha Arzola is a 66 y.o. male who presents for an Initial Medicare Wellness Visit.    He c/o hard of hearing for years.  Afib, he is on eliquis, sees cardiology next month    COPD, he has an appointment with pulmonology next month    CVA, he is uncertain the etiology, but he does have risk factors including A. fib hypertension and hyperlipidemia    The following portions of the patient's history were reviewed and   updated as appropriate: allergies, current medications, past family history, past medical history, past social history, past surgical history and problem list.     Compared to one year ago, the patient feels his physical   health is better.    Compared to one year ago, the patient feels his mental   health is the same.    Recent Hospitalizations:  He was not admitted to the hospital during the last year.       Current Medical Providers:  Patient Care Team:  Ifeoma Faria APRN as PCP - General (Nurse Practitioner)    Outpatient Medications Prior to Visit   Medication Sig Dispense Refill   • albuterol sulfate HFA (Ventolin HFA) 108 (90 Base) MCG/ACT inhaler Ventolin HFA 90 mcg/actuation inhalation HFA aerosol inhaler inhale 1 puff (90 mcg) by inhalation route every 6 hours as needed   Active     • Aspirin Adult Low Strength 81 MG EC tablet Take 81 mg by mouth Daily.     • Eliquis 5 MG tablet tablet TAKE 1 TABLET TWICE A  tablet 3   • levETIRAcetam (KEPPRA) 500 MG tablet Take 2 tablets by mouth 2 (Two) Times a Day. 360 tablet 1   • lisinopril (PRINIVIL,ZESTRIL) 5 MG tablet Take 5 mg by mouth Daily.     • metoprolol succinate XL (TOPROL-XL) 200 MG 24 hr tablet metoprolol succinate 200 mg oral tablet extended release 24 hr TAKE ONE TABLET BY MOUTH ONCE DAILY 5/25/2021  Active     • omega-3 acid ethyl  esters (LOVAZA) 1 g capsule Take 1 g by mouth 2 (Two) Times a Day.     • potassium chloride 10 MEQ CR tablet TAKE TWO TABLETS BY MOUTH EVERY  tablet 2   • simvastatin (ZOCOR) 10 MG tablet Take 10 mg by mouth Daily With Breakfast.     • triamterene-hydrochlorothiazide (MAXZIDE-25) 37.5-25 MG per tablet triamterene-hydrochlorothiazid 37.5-25 mg oral tablet TAKE 1/2 TABLET BY MOUTH DAILY 5/25/2021  Active     • umeclidinium-vilanterol (ANORO ELLIPTA) 62.5-25 MCG/INH aerosol powder  inhaler Inhale 1 puff Daily.       No facility-administered medications prior to visit.       No opioid medication identified on active medication list. I have reviewed chart for other potential  high risk medication/s and harmful drug interactions in the elderly.          Aspirin is on active medication list. Aspirin use is indicated based on review of current medical condition/s. Pros and cons of this therapy have been discussed today. Benefits of this medication outweigh potential harm.  Patient has been encouraged to continue taking this medication.  .      Patient Active Problem List   Diagnosis   • CHF (congestive heart failure) (HCC)   • COPD (chronic obstructive pulmonary disease) (HCC)   • CVA (cerebral vascular accident) (HCC)   • Essential hypertension   • Fatigue   • Hyperlipidemia   • Peripheral vision loss   • Seizures (HCC)   • Cigarette nicotine dependence with nicotine-induced disorder   • Positive colorectal cancer screening using Cologuard test   • Longstanding persistent atrial fibrillation (HCC)     Advance Care Planning  Advance Directive is not on file.  ACP discussion was held with the patient during this visit. he is working on this today    Review of Systems   Constitutional: Positive for fatigue.   HENT: Positive for hearing loss.    Eyes: Positive for visual disturbance.   Respiratory: Negative.    Cardiovascular: Negative.    Gastrointestinal: Negative.    Neurological: Negative.    Psychiatric/Behavioral:  "Negative for sleep disturbance and suicidal ideas. The patient is not nervous/anxious.         Objective       Vitals:    11/11/21 1039   BP: 102/67   BP Location: Left arm   Patient Position: Sitting   Cuff Size: Large Adult   Pulse: 98   Resp: 20   Temp: 97.2 °F (36.2 °C)   TempSrc: Tympanic   SpO2: 97%   Weight: 102 kg (224 lb 11.2 oz)   Height: 177.8 cm (70\")     BMI Readings from Last 1 Encounters:   11/11/21 32.24 kg/m²   BMI is above normal parameters. Recommendations include: educational material, exercise counseling and nutrition counseling    Does the patient have evidence of cognitive impairment? No    Physical Exam  Vitals reviewed.   Constitutional:       Appearance: Normal appearance. He is well-developed.   HENT:      Ears:      Comments: He has a cerumen impaction on the right that is hard and impacted     Mouth/Throat:      Pharynx: No oropharyngeal exudate.   Cardiovascular:      Rate and Rhythm: Normal rate. Rhythm irregular.      Heart sounds: Normal heart sounds. No murmur heard.      Pulmonary:      Effort: Pulmonary effort is normal.      Breath sounds: Normal breath sounds.   Neurological:      Mental Status: He is alert and oriented to person, place, and time.      Cranial Nerves: No cranial nerve deficit.      Motor: No weakness.   Psychiatric:         Mood and Affect: Mood and affect normal.       .  ECG 12 Lead    Date/Time: 11/11/2021 12:49 PM  Performed by: Ifeoma Faria APRN  Authorized by: Ifeoma Faria APRN   Previous ECG: no previous ECG available  Rhythm: atrial fibrillation  Rate: normal  ST Segments: ST segments normal  T Waves: T waves normal  QRS axis: normal  Other: no other findings    Clinical impression: abnormal EKG  Comments: Baseline afib          Lab Results   Component Value Date    TRIG 66 10/11/2021    HDL 41 10/11/2021    LDL 69 10/11/2021    VLDL 14 10/11/2021          HEALTH RISK ASSESSMENT    Smoking Status:  Social History     Tobacco Use "   Smoking Status Current Every Day Smoker   • Packs/day: 1.50   • Years: 47.00   • Pack years: 70.50   • Types: Cigarettes   • Start date: 1974   Smokeless Tobacco Never Used     Alcohol Consumption:  Social History     Substance and Sexual Activity   Alcohol Use None     Fall Risk Screen:    TONIO Fall Risk Assessment was completed, and patient is at LOW risk for falls.Assessment completed on:11/11/2021    Depression Screen:   PHQ-2/PHQ-9 Depression Screening 11/11/2021   Little interest or pleasure in doing things 0   Feeling down, depressed, or hopeless 0   Total Score 0       Health Habits and Functional and Cognitive Screening:  Functional & Cognitive Status 11/11/2021   Do you have difficulty preparing food and eating? No   Do you have difficulty bathing yourself, getting dressed or grooming yourself? No   Do you have difficulty using the toilet? No   Do you have difficulty moving around from place to place? No   Do you have trouble with steps or getting out of a bed or a chair? No   Current Diet Frequent Junk Food   Dental Exam Not up to date   Eye Exam Not up to date   Exercise (times per week) 0 times per week   Current Exercises Include No Regular Exercise   Do you need help using the phone?  No   Are you deaf or do you have serious difficulty hearing?  No   Do you need help with transportation? No   Do you need help shopping? No   Do you need help preparing meals?  No   Do you need help with housework?  No   Do you need help with laundry? No   Do you need help taking your medications? No   Do you need help managing money? No   Do you ever drive or ride in a car without wearing a seat belt? No   Have you felt unusual stress, anger or loneliness in the last month? No   Who do you live with? Alone   If you need help, do you have trouble finding someone available to you? No   Have you been bothered in the last four weeks by sexual problems? No   Do you have difficulty concentrating, remembering or making  decisions? Yes       Age-appropriate Screening Schedule:  Refer to the list below for future screening recommendations based on patient's age, sex and/or medical conditions. Orders for these recommended tests are listed in the plan section. The patient has been provided with a written plan.    Health Maintenance   Topic Date Due   • TDAP/TD VACCINES (1 - Tdap) Never done   • ZOSTER VACCINE (1 of 2) Never done   • LIPID PANEL  10/11/2022   • INFLUENZA VACCINE  Completed            Assessment/Plan   CMS Preventative Services Quick Reference  Risk Factors Identified During Encounter  Alcohol Misuse  Cardiovascular Disease  Dementia/Memory   Depression/Dysphoria  Glaucoma or Family History of Glaucoma  Hearing Problem  Immunizations Discussed/Encouraged (specific Immunizations; Td, Influenza and Shingrix  The above risks/problems have been discussed with the patient.  Follow up actions/plans if indicated are seen below in the Assessment/Plan Section.  Pertinent information has been shared with the patient in the After Visit Summary.    Diagnoses and all orders for this visit:    1. Encounter for annual wellness exam in Medicare patient (Primary)    2. Decreased peripheral vision of both eyes  Comments:  His daughter Amanda who is with him today is going to make him an appointment with an ophthalmologist    3. Essential hypertension  Comments:  BP well controlled today    4. Mixed hyperlipidemia  Comments:  Continue the Zocor    5. Longstanding persistent atrial fibrillation (HCC)  Comments:  continue the eliquis and f/u cardiology    6. Positive colorectal cancer screening using Cologuard test  Comments:  refuses follow up  Assessment & Plan:  Refuses colonoscopy      7. Mixed simple and mucopurulent chronic bronchitis (HCC)  Comments:  he sees pulmonology next month    8. Cigarette nicotine dependence with nicotine-induced disorder  Comments:  And interested in cessation  Assessment & Plan:  Tobacco use is stable, 1.5  ppd.  Smoking cessation counseling was provided.  Tobacco use will be reassessed at the next regular appointment.      9. Impacted cerumen of right ear  Comments:  Debrox drops to the right ear and return if he wants me to rinse it      Follow Up:  No follow-ups on file.     An After Visit Summary and PPPS were made available to the patient.

## 2021-11-11 ENCOUNTER — OFFICE VISIT (OUTPATIENT)
Dept: FAMILY MEDICINE CLINIC | Facility: CLINIC | Age: 66
End: 2021-11-11

## 2021-11-11 VITALS
OXYGEN SATURATION: 97 % | BODY MASS INDEX: 32.17 KG/M2 | HEIGHT: 70 IN | DIASTOLIC BLOOD PRESSURE: 67 MMHG | RESPIRATION RATE: 20 BRPM | SYSTOLIC BLOOD PRESSURE: 102 MMHG | WEIGHT: 224.7 LBS | HEART RATE: 98 BPM | TEMPERATURE: 97.2 F

## 2021-11-11 DIAGNOSIS — I10 ESSENTIAL HYPERTENSION: ICD-10-CM

## 2021-11-11 DIAGNOSIS — F17.219 CIGARETTE NICOTINE DEPENDENCE WITH NICOTINE-INDUCED DISORDER: ICD-10-CM

## 2021-11-11 DIAGNOSIS — Z00.00 ENCOUNTER FOR ANNUAL WELLNESS EXAM IN MEDICARE PATIENT: Primary | ICD-10-CM

## 2021-11-11 DIAGNOSIS — H53.453 DECREASED PERIPHERAL VISION OF BOTH EYES: ICD-10-CM

## 2021-11-11 DIAGNOSIS — R19.5 POSITIVE COLORECTAL CANCER SCREENING USING COLOGUARD TEST: ICD-10-CM

## 2021-11-11 DIAGNOSIS — E78.2 MIXED HYPERLIPIDEMIA: ICD-10-CM

## 2021-11-11 DIAGNOSIS — I48.11 LONGSTANDING PERSISTENT ATRIAL FIBRILLATION (HCC): ICD-10-CM

## 2021-11-11 DIAGNOSIS — J41.8 MIXED SIMPLE AND MUCOPURULENT CHRONIC BRONCHITIS (HCC): ICD-10-CM

## 2021-11-11 DIAGNOSIS — H61.21 IMPACTED CERUMEN OF RIGHT EAR: ICD-10-CM

## 2021-11-11 PROCEDURE — 1160F RVW MEDS BY RX/DR IN RCRD: CPT | Performed by: NURSE PRACTITIONER

## 2021-11-11 PROCEDURE — G0402 INITIAL PREVENTIVE EXAM: HCPCS | Performed by: NURSE PRACTITIONER

## 2021-11-11 PROCEDURE — 1170F FXNL STATUS ASSESSED: CPT | Performed by: NURSE PRACTITIONER

## 2021-11-11 PROCEDURE — G0403 EKG FOR INITIAL PREVENT EXAM: HCPCS | Performed by: NURSE PRACTITIONER

## 2021-11-11 NOTE — ASSESSMENT & PLAN NOTE
Tobacco use is stable, 1.5 ppd.  Smoking cessation counseling was provided.  Tobacco use will be reassessed at the next regular appointment.

## 2021-12-12 PROBLEM — I28.1 PULMONARY ARTERY ANEURYSM: Status: ACTIVE | Noted: 2021-12-12

## 2021-12-12 PROBLEM — I50.32 CHRONIC HEART FAILURE WITH PRESERVED EJECTION FRACTION (HFPEF): Status: ACTIVE | Noted: 2019-12-05

## 2021-12-15 ENCOUNTER — OFFICE VISIT (OUTPATIENT)
Dept: PULMONOLOGY | Facility: CLINIC | Age: 66
End: 2021-12-15

## 2021-12-15 VITALS
WEIGHT: 224 LBS | HEIGHT: 70 IN | RESPIRATION RATE: 14 BRPM | TEMPERATURE: 98 F | BODY MASS INDEX: 32.07 KG/M2 | HEART RATE: 80 BPM | OXYGEN SATURATION: 97 % | DIASTOLIC BLOOD PRESSURE: 70 MMHG | SYSTOLIC BLOOD PRESSURE: 99 MMHG

## 2021-12-15 DIAGNOSIS — J41.8 MIXED SIMPLE AND MUCOPURULENT CHRONIC BRONCHITIS (HCC): ICD-10-CM

## 2021-12-15 DIAGNOSIS — F17.219 CIGARETTE NICOTINE DEPENDENCE WITH NICOTINE-INDUCED DISORDER: Primary | ICD-10-CM

## 2021-12-15 DIAGNOSIS — I28.1 PULMONARY ARTERY ANEURYSM (HCC): ICD-10-CM

## 2021-12-15 PROCEDURE — 99213 OFFICE O/P EST LOW 20 MIN: CPT | Performed by: INTERNAL MEDICINE

## 2021-12-15 RX ORDER — CHLORAL HYDRATE 500 MG
CAPSULE ORAL
COMMUNITY
Start: 2021-05-25 | End: 2021-12-21

## 2021-12-15 RX ORDER — FUROSEMIDE 40 MG/1
TABLET ORAL
COMMUNITY
End: 2021-12-21 | Stop reason: HOSPADM

## 2021-12-15 RX ORDER — ALBUTEROL SULFATE 90 UG/1
2 AEROSOL, METERED RESPIRATORY (INHALATION) EVERY 4 HOURS PRN
Qty: 54 G | Refills: 4 | Status: SHIPPED | OUTPATIENT
Start: 2021-12-15 | End: 2022-06-15 | Stop reason: SDUPTHER

## 2021-12-15 NOTE — ASSESSMENT & PLAN NOTE
COPD is unchanged.  Counseled to avoid exposure to cigarette smoke.  Continue current medications.    Continue Anoro, refills given for rescue inhaler today      Patient has had 1 shot of Oscar & Oscar for COVID-19    Explained to the patient this time he will need boosters but recommended he get a booster shot with a dose of Moderna

## 2021-12-15 NOTE — ASSESSMENT & PLAN NOTE
Tobacco use is active and ongoing stressed the importance of smoking cessation offer pharmacological intervention patient not interested at this time

## 2021-12-15 NOTE — PROGRESS NOTES
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    HEENT:  PERRL, EOMI.  OP, nares clear, no sinus tenderness  Neck:  Supple, no JVD, no thyromegaly  Lymph: no axillary, cervical, supraclavicular lymphadenopathy noted bilaterally  Chest: Faint bilateral expiratory wheezes, tympanic to percussion bilaterally, no work of breathing noted  CV: RRR, no MGR, pulses 2+, equal.  Abd:  Soft, NT, ND, + BS, no HSM  EXT:  no clubbing, no cyanosis, no edema, no joint tenderness  Neuro:  A&Ox3, CN grossly intact, no focal deficits.  Skin: No rashes or lesions noted  Result Review :                 Assessment and Plan    Diagnoses and all orders for this visit:    1. Cigarette nicotine dependence with nicotine-induced disorder (Primary)  Assessment & Plan:  Tobacco use is active and ongoing stressed the importance of smoking cessation offer pharmacological intervention patient not interested at this time      2. Mixed simple and mucopurulent chronic bronchitis (HCC)  Assessment & Plan:  COPD is unchanged.  Counseled to avoid exposure to cigarette smoke.  Continue current medications.    Continue Anoro, refills given for rescue inhaler today      Patient has had 1 shot of Oscar & Oscar for " COVID-19    Explained to the patient this time he will need boosters but recommended he get a booster shot with a dose of Moderna      3. Pulmonary artery aneurysm (HCC)  Assessment & Plan:  Stable on CT scan  No evidence of a pH  Continue yearly CTs      Other orders  -     umeclidinium-vilanterol (Anoro Ellipta) 62.5-25 MCG/INH aerosol powder  inhaler; Inhale 1 puff Daily.  Dispense: 3 each; Refill: 4  -     albuterol sulfate  (90 Base) MCG/ACT inhaler; Inhale 2 puffs Every 4 (Four) Hours As Needed for Wheezing.  Dispense: 54 g; Refill: 4      Follow Up   Return in about 6 months (around 6/15/2022).  Patient was given instructions and counseling regarding his condition or for health maintenance advice. Please see specific information pulled into the AVS if appropriate.

## 2021-12-21 ENCOUNTER — OFFICE VISIT (OUTPATIENT)
Dept: CARDIOLOGY | Facility: CLINIC | Age: 66
End: 2021-12-21

## 2021-12-21 VITALS
HEIGHT: 70 IN | BODY MASS INDEX: 33.07 KG/M2 | HEART RATE: 92 BPM | WEIGHT: 231 LBS | DIASTOLIC BLOOD PRESSURE: 80 MMHG | SYSTOLIC BLOOD PRESSURE: 122 MMHG

## 2021-12-21 DIAGNOSIS — F17.219 CIGARETTE NICOTINE DEPENDENCE WITH NICOTINE-INDUCED DISORDER: ICD-10-CM

## 2021-12-21 DIAGNOSIS — I10 ESSENTIAL HYPERTENSION: ICD-10-CM

## 2021-12-21 DIAGNOSIS — I50.32 CHRONIC HEART FAILURE WITH PRESERVED EJECTION FRACTION (HFPEF) (HCC): ICD-10-CM

## 2021-12-21 DIAGNOSIS — I48.20 ATRIAL FIBRILLATION, CHRONIC (HCC): Primary | ICD-10-CM

## 2021-12-21 PROBLEM — I28.1 PULMONARY ARTERY ANEURYSM: Status: RESOLVED | Noted: 2021-12-12 | Resolved: 2021-12-21

## 2021-12-21 PROBLEM — I63.9 CVA (CEREBRAL VASCULAR ACCIDENT): Status: RESOLVED | Noted: 2019-12-05 | Resolved: 2021-12-21

## 2021-12-21 PROBLEM — E78.5 HYPERLIPIDEMIA: Status: RESOLVED | Noted: 2019-12-05 | Resolved: 2021-12-21

## 2021-12-21 PROBLEM — I28.1 PULMONARY ARTERY ANEURYSM: Status: RESOLVED | Noted: 2021-12-15 | Resolved: 2021-12-21

## 2021-12-21 PROBLEM — I48.11 LONGSTANDING PERSISTENT ATRIAL FIBRILLATION: Status: RESOLVED | Noted: 2021-11-11 | Resolved: 2021-12-21

## 2021-12-21 PROCEDURE — 99214 OFFICE O/P EST MOD 30 MIN: CPT | Performed by: NURSE PRACTITIONER

## 2021-12-21 RX ORDER — SIMVASTATIN 10 MG
10 TABLET ORAL
Qty: 90 TABLET | Refills: 2 | Status: SHIPPED | OUTPATIENT
Start: 2021-12-21 | End: 2022-09-09

## 2021-12-21 RX ORDER — POTASSIUM CHLORIDE 750 MG/1
20 TABLET, FILM COATED, EXTENDED RELEASE ORAL DAILY
Qty: 180 TABLET | Refills: 2 | Status: SHIPPED | OUTPATIENT
Start: 2021-12-21 | End: 2022-09-09

## 2021-12-21 RX ORDER — METOPROLOL SUCCINATE 200 MG/1
200 TABLET, EXTENDED RELEASE ORAL DAILY
Qty: 90 TABLET | Refills: 2 | Status: SHIPPED | OUTPATIENT
Start: 2021-12-21 | End: 2022-09-09

## 2021-12-21 RX ORDER — LISINOPRIL 5 MG/1
5 TABLET ORAL DAILY
Qty: 90 TABLET | Refills: 2 | Status: SHIPPED | OUTPATIENT
Start: 2021-12-21 | End: 2022-09-09

## 2021-12-21 RX ORDER — TRIAMTERENE AND HYDROCHLOROTHIAZIDE 37.5; 25 MG/1; MG/1
1 TABLET ORAL DAILY
Qty: 90 TABLET | Refills: 2 | Status: SHIPPED | OUTPATIENT
Start: 2021-12-21 | End: 2022-09-09

## 2021-12-21 NOTE — PROGRESS NOTES
Chief Complaint  Atrial Fibrillation (Follow up with EKG)    Subjective            History of Present Illness  Sacha Arzola is a 66-year-old white/ male patient who presents to the office today for follow-up.  He has chronic atrial fibrillation, heart failure, hypertension, is a smoker, and has history of pulmonary arterial aneurysm.  He is requesting refill on all of his medications.  He reports compliance with medications.  He denies chest pain, shortness of breath, lightheadedness/dizziness, palpitations, or edema.    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 No pertinent surgical history.       SOC  Social History     Socioeconomic History   • Marital status:    Tobacco Use   • Smoking status: Current Every Day Smoker     Packs/day: 1.50     Years: 47.00     Pack years: 70.50     Types: Cigarettes     Start date: 1974   • Smokeless tobacco: Never Used   Vaping Use   • Vaping Use: Never used   Substance and Sexual Activity   • Alcohol use: Yes     Comment: occ   • Drug use: Never   • Sexual activity: Defer         FAMHX  History reviewed. No pertinent family history.       MEDSIGONLY  Current Outpatient Medications on File Prior to Visit   Medication Sig   • albuterol sulfate  (90 Base) MCG/ACT inhaler Inhale 2 puffs Every 4 (Four) Hours As Needed for Wheezing.   • Aspirin Adult Low Strength 81 MG EC tablet Take 81 mg by mouth Daily.   • Eliquis 5 MG tablet  "tablet TAKE 1 TABLET TWICE A DAY   • levETIRAcetam (KEPPRA) 500 MG tablet Take 2 tablets by mouth 2 (Two) Times a Day.   • lisinopril (PRINIVIL,ZESTRIL) 5 MG tablet Take 5 mg by mouth Daily.   • metoprolol succinate XL (TOPROL-XL) 200 MG 24 hr tablet metoprolol succinate 200 mg oral tablet extended release 24 hr TAKE ONE TABLET BY MOUTH ONCE DAILY 5/25/2021  Active   • omega-3 acid ethyl esters (LOVAZA) 1 g capsule Take 1 g by mouth 2 (Two) Times a Day.   • potassium chloride 10 MEQ CR tablet TAKE TWO TABLETS BY MOUTH EVERY DAY   • simvastatin (ZOCOR) 10 MG tablet Take 10 mg by mouth Daily With Breakfast.   • triamterene-hydrochlorothiazide (MAXZIDE-25) 37.5-25 MG per tablet triamterene-hydrochlorothiazid 37.5-25 mg oral tablet TAKE 1/2 TABLET BY MOUTH DAILY 5/25/2021  Active   • umeclidinium-vilanterol (ANORO ELLIPTA) 62.5-25 MCG/INH aerosol powder  inhaler Inhale 1 puff Daily.   • [DISCONTINUED] albuterol sulfate HFA (Ventolin HFA) 108 (90 Base) MCG/ACT inhaler Ventolin HFA 90 mcg/actuation inhalation HFA aerosol inhaler inhale 1 puff (90 mcg) by inhalation route every 6 hours as needed   Active   • [DISCONTINUED] furosemide (LASIX) 40 MG tablet furosemide 40 mg oral tablet take 1 tablet (40 mg) by oral route once daily   Active   • [DISCONTINUED] Omega-3 Fatty Acids (fish oil) 1000 MG capsule capsule omega-3 acid ethyl esters 1 gram oral capsule TAKE ONE CAPSULE BY MOUTH TWICE DAILY 5/25/2021  Active   • [DISCONTINUED] umeclidinium-vilanterol (Anoro Ellipta) 62.5-25 MCG/INH aerosol powder  inhaler Inhale 1 puff Daily.     No current facility-administered medications on file prior to visit.         Objective   /80 (Patient Position: Sitting)   Pulse 92   Ht 177.8 cm (70\")   Wt 105 kg (231 lb)   BMI 33.15 kg/m²       Physical Exam  HENT:      Head: Normocephalic.   Neck:      Vascular: No carotid bruit.   Cardiovascular:      Rate and Rhythm: Normal rate. Rhythm irregular.      Pulses: Normal pulses.      " Heart sounds: Normal heart sounds. No murmur heard.      Pulmonary:      Effort: Pulmonary effort is normal.      Breath sounds: Normal breath sounds.   Musculoskeletal:      Cervical back: Neck supple.      Right lower leg: No edema.      Left lower leg: No edema.   Skin:     General: Skin is dry.      Capillary Refill: Capillary refill takes less than 2 seconds.   Neurological:      Mental Status: He is alert and oriented to person, place, and time.   Psychiatric:         Behavior: Behavior normal.       Result Review :   The following data was reviewed by: EVIN Carballo on 12/21/2021:  No results found for: PROBNP  CMP    CMP 10/11/21   Glucose 113 (A)   BUN 13   Creatinine 1.03   eGFR Non African Am 72   Sodium 135 (A)   Potassium 3.8   Chloride 98   Calcium 8.9   Albumin 3.70   Total Bilirubin 0.7   Alkaline Phosphatase 103   AST (SGOT) 18   ALT (SGPT) 18   (A) Abnormal value       Comments are available for some flowsheets but are not being displayed.           CBC w/diff    CBC w/Diff 10/11/21   WBC 7.73   RBC 6.27 (A)   Hemoglobin 19.8 (A)   Hematocrit 56.6 (A)   MCV 90.3   MCH 31.6   MCHC 35.0   RDW 13.2   Platelets 208   Neutrophil Rel % 61.0   Immature Granulocyte Rel % 0.3   Lymphocyte Rel % 30.1   Monocyte Rel % 4.9 (A)   Eosinophil Rel % 2.3   Basophil Rel % 1.4   (A) Abnormal value       Comments are available for some flowsheets but are not being displayed.            Lab Results   Component Value Date    TSH 2.050 10/11/2021      No results found for: FREET4   No results found for: DDIMERQUANT  No results found for: MG   No results found for: DIGOXIN   No results found for: TROPONINT        Lipid Panel    Lipid Panel 10/11/21   Total Cholesterol 124   Triglycerides 66   HDL Cholesterol 41   VLDL Cholesterol 14   LDL Cholesterol  69   LDL/HDL Ratio 1.70   (A) Abnormal value         4/21/2017 echo  1.  Technically difficult images  2.  Grossly normal left ventricular systolic function EF of  55%  3.  Borderline right ventricular enlargement  4.  Mild tricuspid regurgitation with normal estimated PA pressures       Assessment and Plan    Diagnoses and all orders for this visit:    1. Atrial fibrillation, chronic (Primary)  Symptomatically stable at this time, continue metoprolol 200 mg daily.  Continue Eliquis for CVA prevention.    2. Chronic heart failure with preserved ejection fraction  Last echocardiogram in 2017, obtain new echocardiogram to evaluate left ventricular systolic function.  Currently symptomatically stable and euvolemic on exam.  Continue lisinopril 5 mg daily and metoprolol 200 mg daily.  -     Adult Transthoracic Echo Complete W/ Cont if Necessary Per Protocol; Future    3. Essential hypertension  Currently controlled and without adverse effects from medication, continue triamterene HCTZ 37.5-25 mg daily and lisinopril 5 mg daily.    4. Smoker  Smoking cessation counseling provided to patient today but was unable to get patient to commit to cessation plan.    Other orders  -     lisinopril (PRINIVIL,ZESTRIL) 5 MG tablet; Take 1 tablet by mouth Daily.  Dispense: 90 tablet; Refill: 2  -     metoprolol succinate XL (TOPROL-XL) 200 MG 24 hr tablet; Take 1 tablet by mouth Daily.  Dispense: 90 tablet; Refill: 2  -     potassium chloride 10 MEQ CR tablet; Take 2 tablets by mouth Daily.  Dispense: 180 tablet; Refill: 2  -     simvastatin (ZOCOR) 10 MG tablet; Take 1 tablet by mouth Daily With Breakfast.  Dispense: 90 tablet; Refill: 2  -     triamterene-hydrochlorothiazide (MAXZIDE-25) 37.5-25 MG per tablet; Take 1 tablet by mouth Daily.  Dispense: 90 tablet; Refill: 2  -     apixaban (Eliquis) 5 MG tablet tablet; Take 1 tablet by mouth 2 (Two) Times a Day.  Dispense: 180 tablet; Refill: 3            Follow Up   Return in about 6 months (around 6/21/2022) for Follow up with Dr Ch.    Patient was given instructions and counseling regarding his condition or for health maintenance advice.  Please see specific information pulled into the AVS if appropriate.     Sacha Wise Ness  reports that he has been smoking cigarettes. He started smoking about 48 years ago. He has a 70.50 pack-year smoking history. He has never used smokeless tobacco.. I have educated him on the risk of diseases from using tobacco products such as cancer, COPD and heart disease.     I advised him to quit and he is not willing to quit.    I spent 4 minutes counseling the patient.           Lilly Jefferson, APRN  12/21/21  08:24 EST    Dictated Utilizing Dragon Dictation

## 2021-12-21 NOTE — PATIENT INSTRUCTIONS
"Low-Sodium Eating Plan  Sodium, which is an element that makes up salt, helps you maintain a healthy balance of fluids in your body. Too much sodium can increase your blood pressure and cause fluid and waste to be held in your body.  Your health care provider or dietitian may recommend following this plan if you have high blood pressure (hypertension), kidney disease, liver disease, or heart failure. Eating less sodium can help lower your blood pressure, reduce swelling, and protect your heart, liver, and kidneys.  What are tips for following this plan?  Reading food labels  · The Nutrition Facts label lists the amount of sodium in one serving of the food. If you eat more than one serving, you must multiply the listed amount of sodium by the number of servings.  · Choose foods with less than 140 mg of sodium per serving.  · Avoid foods with 300 mg of sodium or more per serving.  Shopping    · Look for lower-sodium products, often labeled as \"low-sodium\" or \"no salt added.\"  · Always check the sodium content, even if foods are labeled as \"unsalted\" or \"no salt added.\"  · Buy fresh foods.  ? Avoid canned foods and pre-made or frozen meals.  ? Avoid canned, cured, or processed meats.  · Buy breads that have less than 80 mg of sodium per slice.    Cooking    · Eat more home-cooked food and less restaurant, buffet, and fast food.  · Avoid adding salt when cooking. Use salt-free seasonings or herbs instead of table salt or sea salt. Check with your health care provider or pharmacist before using salt substitutes.  · Cook with plant-based oils, such as canola, sunflower, or olive oil.    Meal planning  · When eating at a restaurant, ask that your food be prepared with less salt or no salt, if possible. Avoid dishes labeled as brined, pickled, cured, smoked, or made with soy sauce, miso, or teriyaki sauce.  · Avoid foods that contain MSG (monosodium glutamate). MSG is sometimes added to Chinese food, bouillon, and some " "canned foods.  · Make meals that can be grilled, baked, poached, roasted, or steamed. These are generally made with less sodium.  General information  Most people on this plan should limit their sodium intake to 1,500-2,000 mg (milligrams) of sodium each day.  What foods should I eat?  Fruits  Fresh, frozen, or canned fruit. Fruit juice.  Vegetables  Fresh or frozen vegetables. \"No salt added\" canned vegetables. \"No salt added\" tomato sauce and paste. Low-sodium or reduced-sodium tomato and vegetable juice.  Grains  Low-sodium cereals, including oats, puffed wheat and rice, and shredded wheat. Low-sodium crackers. Unsalted rice. Unsalted pasta. Low-sodium bread. Whole-grain breads and whole-grain pasta.  Meats and other proteins  Fresh or frozen (no salt added) meat, poultry, seafood, and fish. Low-sodium canned tuna and salmon. Unsalted nuts. Dried peas, beans, and lentils without added salt. Unsalted canned beans. Eggs. Unsalted nut butters.  Dairy  Milk. Soy milk. Cheese that is naturally low in sodium, such as ricotta cheese, fresh mozzarella, or Swiss cheese. Low-sodium or reduced-sodium cheese. Cream cheese. Yogurt.  Seasonings and condiments  Fresh and dried herbs and spices. Salt-free seasonings. Low-sodium mustard and ketchup. Sodium-free salad dressing. Sodium-free light mayonnaise. Fresh or refrigerated horseradish. Lemon juice. Vinegar.  Other foods  Homemade, reduced-sodium, or low-sodium soups. Unsalted popcorn and pretzels. Low-salt or salt-free chips.  The items listed above may not be a complete list of foods and beverages you can eat. Contact a dietitian for more information.  What foods should I avoid?  Vegetables  Sauerkraut, pickled vegetables, and relishes. Olives. French fries. Onion rings. Regular canned vegetables (not low-sodium or reduced-sodium). Regular canned tomato sauce and paste (not low-sodium or reduced-sodium). Regular tomato and vegetable juice (not low-sodium or reduced-sodium). " Frozen vegetables in sauces.  Grains  Instant hot cereals. Bread stuffing, pancake, and biscuit mixes. Croutons. Seasoned rice or pasta mixes. Noodle soup cups. Boxed or frozen macaroni and cheese. Regular salted crackers. Self-rising flour.  Meats and other proteins  Meat or fish that is salted, canned, smoked, spiced, or pickled. Precooked or cured meat, such as sausages or meat loaves. Schulz. Ham. Pepperoni. Hot dogs. Corned beef. Chipped beef. Salt pork. Jerky. Pickled herring. Anchovies and sardines. Regular canned tuna. Salted nuts.  Dairy  Processed cheese and cheese spreads. Hard cheeses. Cheese curds. Blue cheese. Feta cheese. String cheese. Regular cottage cheese. Buttermilk. Canned milk.  Fats and oils  Salted butter. Regular margarine. Ghee. Schulz fat.  Seasonings and condiments  Onion salt, garlic salt, seasoned salt, table salt, and sea salt. Canned and packaged gravies. Worcestershire sauce. Tartar sauce. Barbecue sauce. Teriyaki sauce. Soy sauce, including reduced-sodium. Steak sauce. Fish sauce. Oyster sauce. Cocktail sauce. Horseradish that you find on the shelf. Regular ketchup and mustard. Meat flavorings and tenderizers. Bouillon cubes. Hot sauce. Pre-made or packaged marinades. Pre-made or packaged taco seasonings. Relishes. Regular salad dressings. Salsa.  Other foods  Salted popcorn and pretzels. Corn chips and puffs. Potato and tortilla chips. Canned or dried soups. Pizza. Frozen entrees and pot pies.  The items listed above may not be a complete list of foods and beverages you should avoid. Contact a dietitian for more information.  Summary  · Eating less sodium can help lower your blood pressure, reduce swelling, and protect your heart, liver, and kidneys.  · Most people on this plan should limit their sodium intake to 1,500-2,000 mg (milligrams) of sodium each day.  · Canned, boxed, and frozen foods are high in sodium. Restaurant foods, fast foods, and pizza are also very high in sodium.  You also get sodium by adding salt to food.  · Try to cook at home, eat more fresh fruits and vegetables, and eat less fast food and canned, processed, or prepared foods.  This information is not intended to replace advice given to you by your health care provider. Make sure you discuss any questions you have with your health care provider.  Document Revised: 01/22/2021 Document Reviewed: 11/18/2020  [employer] Patient Education © 2021 [employer]

## 2022-02-17 ENCOUNTER — OFFICE VISIT (OUTPATIENT)
Dept: SLEEP MEDICINE | Facility: HOSPITAL | Age: 67
End: 2022-02-17

## 2022-02-17 VITALS
HEIGHT: 70 IN | BODY MASS INDEX: 31.21 KG/M2 | TEMPERATURE: 97 F | DIASTOLIC BLOOD PRESSURE: 55 MMHG | OXYGEN SATURATION: 95 % | HEART RATE: 90 BPM | SYSTOLIC BLOOD PRESSURE: 91 MMHG | WEIGHT: 218 LBS

## 2022-02-17 DIAGNOSIS — E66.9 OBESITY WITH SERIOUS COMORBIDITY, UNSPECIFIED CLASSIFICATION, UNSPECIFIED OBESITY TYPE: ICD-10-CM

## 2022-02-17 DIAGNOSIS — G47.33 OBSTRUCTIVE SLEEP APNEA: Primary | ICD-10-CM

## 2022-02-17 DIAGNOSIS — J44.9 CHRONIC OBSTRUCTIVE PULMONARY DISEASE, UNSPECIFIED COPD TYPE: ICD-10-CM

## 2022-02-17 PROCEDURE — G0463 HOSPITAL OUTPT CLINIC VISIT: HCPCS | Performed by: FAMILY MEDICINE

## 2022-02-17 PROCEDURE — 99213 OFFICE O/P EST LOW 20 MIN: CPT | Performed by: FAMILY MEDICINE

## 2022-02-17 NOTE — PROGRESS NOTES
Follow Up [facility] Disorders [facility] Note     Chief Complaint:  KATHARINE     Primary Care Physician: Ifeoma Faria APRN Richard Frandy Arzola is a 67 y.o.male  was last seen at [facility] sleep lab: 9/10/2021.  History of obstructive sleep apnea with underlying COPD.  Sleep study March 2016 showed overall  with hypoxemia.  At last visit machine was broken beyond repair.  Order for new auto CPAP machine was placed at last visit.  Presents today for first follow-up visit since receiving machine.  Patient reports bedtime and wake time varies sleep 3 to 4 hours.  ESS of 3.    Patient does report cigarette use, 4-6 alcoholic drinks per week, 2 sodas a day 0 energy drinks.    Uses a full facemask which fits well with 0 leaks.  No problems with hypersomnia or nonrestorative sleep.    Results Review:  DME is [facility].  Downloads between 10/13/2021-11/11/2021.  Average usage is 10 hours 57 minutes.  Average AHI is 4.1.  Average AutoCPAP pressure is 12.3 cm H2O.    Current Medications:    Current Outpatient Medications:   •  albuterol sulfate  (90 Base) MCG/ACT inhaler, Inhale 2 puffs Every 4 (Four) Hours As Needed for Wheezing., Disp: 54 g, Rfl: 4  •  apixaban (Eliquis) 5 MG tablet tablet, Take 1 tablet by mouth 2 (Two) Times a Day., Disp: 180 tablet, Rfl: 3  •  Aspirin Adult Low Strength 81 MG EC tablet, Take 81 mg by mouth Daily., Disp: , Rfl:   •  levETIRAcetam (KEPPRA) 500 MG tablet, Take 2 tablets by mouth 2 (Two) Times a Day., Disp: 360 tablet, Rfl: 1  •  lisinopril (PRINIVIL,ZESTRIL) 5 MG tablet, Take 1 tablet by mouth Daily., Disp: 90 tablet, Rfl: 2  •  metoprolol succinate XL (TOPROL-XL) 200 MG 24 hr tablet, Take 1 tablet by mouth Daily., Disp: 90 tablet, Rfl: 2  •  omega-3 acid ethyl esters (LOVAZA) 1 g capsule, Take 1 g by mouth 2 (Two) Times a Day., Disp: , Rfl:   •  potassium chloride 10 MEQ CR tablet, Take 2 tablets by mouth Daily., Disp: 180 tablet, Rfl: 2  •  simvastatin (ZOCOR) 10 MG tablet, Take  "1 tablet by mouth Daily With Breakfast., Disp: 90 tablet, Rfl: 2  •  triamterene-hydrochlorothiazide (MAXZIDE-25) 37.5-25 MG per tablet, Take 1 tablet by mouth Daily., Disp: 90 tablet, Rfl: 2  •  umeclidinium-vilanterol (ANORO ELLIPTA) 62.5-25 MCG/INH aerosol powder  inhaler, Inhale 1 puff Daily., Disp: , Rfl:    also entered in Sleep Questionnaire    Patient  has a past medical history of Atrial fibrillation, chronic (12/21/2021), Chronic heart failure with preserved ejection fraction (12/5/2019), COPD (chronic obstructive pulmonary disease) ([facility]) (12/05/2019), CVA (cerebral vascular accident) ([facility]) (12/05/2019), Essential hypertension (12/05/2019), Fatigue (12/05/2019), Peripheral vision loss (12/05/2019), Positive colorectal cancer screening using Cologuard test, Pulmonary artery aneurysm (12/15/2021), and Seizures ([facility]).    Social History:    Social History     Socioeconomic History   • Marital status:    Tobacco Use   • Smoking status: Current Every Day Smoker     Packs/day: 1.50     Years: 47.00     Pack years: 70.50     Types: Cigarettes     Start date: 1974   • Smokeless tobacco: Never Used   Vaping Use   • Vaping Use: Never used   Substance and Sexual Activity   • Alcohol use: Yes     Comment: occ   • Drug use: Never   • Sexual activity: Defer       Allergies:  Patient has no known allergies.    Vital Signs:    Vitals:    02/17/22 0900   BP: 91/55   Pulse: 90   Temp: 97 °F (36.1 °C)   SpO2: 95%   Weight: 98.9 kg (218 lb)   Height: 177.8 cm (70\")     Body mass index is 31.28 kg/m².    REVIEW OF SYSTEMS.  Full review of systems available on the intake form which is scanned in the media tab.  The relevant positive are noted below  1. Daytime excessive sleepiness with [place] Sleepiness Scale :Total score: 3   2. Snoring  3. Nasal congestion postnasal drip      Physical exam:  Vitals:    02/17/22 0900   BP: 91/55   Pulse: 90   Temp: 97 °F (36.1 °C)   SpO2: 95%   Weight: 98.9 kg (218 lb)   Height: 177.8 " "cm (70\")    Body mass index is 31.28 kg/m².    HEENT: Head is atraumatic, normocephalic  Eyes: pupils are round equal and reacting to light and accommodation, conjunctiva normal  Nose: no nasal septal defects or deviation and the nasal passages are clear, no nasal polyps,  Throat: tongue normal, oral airway Mallampati class 4  NECK: , trachea is in the midline, thyroid not enlarged  RESPIRATORY SYSTEM: Breath sounds are equal on both sides, there are no wheezes   CARDIOVASULAR SYSTEM: Heart sounds are regular rhythm and viki rate, no edema  EXTREMITES: No cyanosis, clubbing  NEUROLOGICAL SYSTEM: Oriented x 3, no gross motor defects, gait normal    Impression:  1. Obstructive sleep apnea    2. Chronic obstructive pulmonary disease, unspecified COPD type (HCC)    3. Obesity with serious comorbidity, unspecified classification, unspecified obesity type        Obstructive sleep apnea adequately treated with auto CPAP 12-20 cm H2O with good compliance and usage and no complaints of hypersomnolence.  Return to clinic in 6 months for follow-up or sooner if needed.    Patient uses the CPAP device and benefits from its use in terms of reduction of hypersomnia and snoring.Weight loss will be strongly beneficial to reduce the severity of sleep-disordered breathing.  Caution during activities that require prolonged concentration is strongly advised if sleepiness returns. Changing of PAP supplies regularly is important for effective use. Patient needs to change cushion on the mask or plugs on nasal pillows along with disposable filters once every month and change mask frame, tubing, headgear and Velcro straps every 6 months at the minimum.    Binh Morrow MD  Sleep Medicine  02/17/22  10:38 EST      "

## 2022-03-15 DIAGNOSIS — R56.9 SEIZURES: ICD-10-CM

## 2022-03-15 RX ORDER — LEVETIRACETAM 500 MG/1
TABLET ORAL
Qty: 120 TABLET | Refills: 0 | Status: SHIPPED | OUTPATIENT
Start: 2022-03-15 | End: 2022-04-14 | Stop reason: SDUPTHER

## 2022-03-15 RX ORDER — OMEGA-3-ACID ETHYL ESTERS 1 G/1
CAPSULE, LIQUID FILLED ORAL
Qty: 180 CAPSULE | Refills: 3 | Status: SHIPPED | OUTPATIENT
Start: 2022-03-15 | End: 2023-03-16

## 2022-03-15 RX ORDER — ASPIRIN 81 MG/1
TABLET ORAL
Qty: 90 TABLET | Refills: 3 | Status: SHIPPED | OUTPATIENT
Start: 2022-03-15 | End: 2023-03-16

## 2022-03-25 ENCOUNTER — TELEPHONE (OUTPATIENT)
Dept: CARDIOLOGY | Facility: CLINIC | Age: 67
End: 2022-03-25

## 2022-03-25 NOTE — TELEPHONE ENCOUNTER
----- Message from EVIN Perez sent at 3/25/2022  2:44 PM EDT -----  Notify pt echo result: EF 63% and some moderate dilation of right ventricle. Continue current meds. Keep July follow up

## 2022-03-28 ENCOUNTER — TELEPHONE (OUTPATIENT)
Dept: PULMONOLOGY | Facility: CLINIC | Age: 67
End: 2022-03-28

## 2022-03-28 DIAGNOSIS — J44.9 CHRONIC OBSTRUCTIVE PULMONARY DISEASE, UNSPECIFIED COPD TYPE: Primary | ICD-10-CM

## 2022-03-28 RX ORDER — TIOTROPIUM BROMIDE AND OLODATEROL 3.124; 2.736 UG/1; UG/1
2 SPRAY, METERED RESPIRATORY (INHALATION)
Qty: 3 EACH | Refills: 4 | Status: SHIPPED | OUTPATIENT
Start: 2022-03-28 | End: 2022-06-15 | Stop reason: SDUPTHER

## 2022-03-28 NOTE — TELEPHONE ENCOUNTER
Pt called said that [facility] was going to charge him $600 for his umeclidinium-vilanterol (ANORO ELLIPTA) 62.5-25 MCG/INH aerosol powder  inhaler next month. Pt wants to know if he can substitute that for something his insurance covers. Please contact the pt at [phone].

## 2022-04-13 DIAGNOSIS — R56.9 SEIZURES: ICD-10-CM

## 2022-04-14 ENCOUNTER — OFFICE VISIT (OUTPATIENT)
Dept: FAMILY MEDICINE CLINIC | Facility: CLINIC | Age: 67
End: 2022-04-14

## 2022-04-14 VITALS
TEMPERATURE: 97.4 F | RESPIRATION RATE: 22 BRPM | SYSTOLIC BLOOD PRESSURE: 98 MMHG | OXYGEN SATURATION: 97 % | BODY MASS INDEX: 31.45 KG/M2 | DIASTOLIC BLOOD PRESSURE: 65 MMHG | HEART RATE: 76 BPM | HEIGHT: 70 IN | WEIGHT: 219.7 LBS

## 2022-04-14 DIAGNOSIS — Z86.73 HISTORY OF STROKE: ICD-10-CM

## 2022-04-14 DIAGNOSIS — I10 ESSENTIAL HYPERTENSION: ICD-10-CM

## 2022-04-14 DIAGNOSIS — R56.9 SEIZURES: Primary | ICD-10-CM

## 2022-04-14 LAB
ALBUMIN SERPL-MCNC: 4.2 G/DL (ref 3.5–5.2)
ALBUMIN/GLOB SERPL: 1.3 G/DL
ALP SERPL-CCNC: 92 U/L (ref 39–117)
ALT SERPL W P-5'-P-CCNC: 26 U/L (ref 1–41)
ANION GAP SERPL CALCULATED.3IONS-SCNC: 12 MMOL/L (ref 5–15)
AST SERPL-CCNC: 26 U/L (ref 1–40)
BASOPHILS # BLD AUTO: 0.08 10*3/MM3 (ref 0–0.2)
BASOPHILS NFR BLD AUTO: 0.9 % (ref 0–1.5)
BILIRUB SERPL-MCNC: 0.7 MG/DL (ref 0–1.2)
BUN SERPL-MCNC: 9 MG/DL (ref 8–23)
BUN/CREAT SERPL: 9.2 (ref 7–25)
CALCIUM SPEC-SCNC: 9.1 MG/DL (ref 8.6–10.5)
CHLORIDE SERPL-SCNC: 99 MMOL/L (ref 98–107)
CHOLEST SERPL-MCNC: 116 MG/DL (ref 0–200)
CO2 SERPL-SCNC: 25 MMOL/L (ref 22–29)
CREAT SERPL-MCNC: 0.98 MG/DL (ref 0.76–1.27)
DEPRECATED RDW RBC AUTO: 42.5 FL (ref 37–54)
EGFRCR SERPLBLD CKD-EPI 2021: 85 ML/MIN/1.73
EOSINOPHIL # BLD AUTO: 0.15 10*3/MM3 (ref 0–0.4)
EOSINOPHIL NFR BLD AUTO: 1.7 % (ref 0.3–6.2)
ERYTHROCYTE [DISTWIDTH] IN BLOOD BY AUTOMATED COUNT: 12.5 % (ref 12.3–15.4)
GLOBULIN UR ELPH-MCNC: 3.3 GM/DL
GLUCOSE SERPL-MCNC: 111 MG/DL (ref 65–99)
HCT VFR BLD AUTO: 55.3 % (ref 37.5–51)
HDLC SERPL-MCNC: 43 MG/DL (ref 40–60)
HGB BLD-MCNC: 19 G/DL (ref 13–17.7)
IMM GRANULOCYTES # BLD AUTO: 0.02 10*3/MM3 (ref 0–0.05)
IMM GRANULOCYTES NFR BLD AUTO: 0.2 % (ref 0–0.5)
LDLC SERPL CALC-MCNC: 58 MG/DL (ref 0–100)
LDLC/HDLC SERPL: 1.37 {RATIO}
LYMPHOCYTES # BLD AUTO: 1.89 10*3/MM3 (ref 0.7–3.1)
LYMPHOCYTES NFR BLD AUTO: 20.8 % (ref 19.6–45.3)
MCH RBC QN AUTO: 32.3 PG (ref 26.6–33)
MCHC RBC AUTO-ENTMCNC: 34.4 G/DL (ref 31.5–35.7)
MCV RBC AUTO: 93.9 FL (ref 79–97)
MONOCYTES # BLD AUTO: 0.43 10*3/MM3 (ref 0.1–0.9)
MONOCYTES NFR BLD AUTO: 4.7 % (ref 5–12)
NEUTROPHILS NFR BLD AUTO: 6.5 10*3/MM3 (ref 1.7–7)
NEUTROPHILS NFR BLD AUTO: 71.7 % (ref 42.7–76)
NRBC BLD AUTO-RTO: 0 /100 WBC (ref 0–0.2)
PLATELET # BLD AUTO: 210 10*3/MM3 (ref 140–450)
PMV BLD AUTO: 10.5 FL (ref 6–12)
POTASSIUM SERPL-SCNC: 3.9 MMOL/L (ref 3.5–5.2)
PROT SERPL-MCNC: 7.5 G/DL (ref 6–8.5)
RBC # BLD AUTO: 5.89 10*6/MM3 (ref 4.14–5.8)
SODIUM SERPL-SCNC: 136 MMOL/L (ref 136–145)
TRIGL SERPL-MCNC: 71 MG/DL (ref 0–150)
TSH SERPL DL<=0.05 MIU/L-ACNC: 2.16 UIU/ML (ref 0.27–4.2)
VLDLC SERPL-MCNC: 15 MG/DL (ref 5–40)
WBC NRBC COR # BLD: 9.07 10*3/MM3 (ref 3.4–10.8)

## 2022-04-14 PROCEDURE — 80061 LIPID PANEL: CPT | Performed by: NURSE PRACTITIONER

## 2022-04-14 PROCEDURE — 84443 ASSAY THYROID STIM HORMONE: CPT | Performed by: NURSE PRACTITIONER

## 2022-04-14 PROCEDURE — 80053 COMPREHEN METABOLIC PANEL: CPT | Performed by: NURSE PRACTITIONER

## 2022-04-14 PROCEDURE — 36415 COLL VENOUS BLD VENIPUNCTURE: CPT | Performed by: NURSE PRACTITIONER

## 2022-04-14 PROCEDURE — 85025 COMPLETE CBC W/AUTO DIFF WBC: CPT | Performed by: NURSE PRACTITIONER

## 2022-04-14 PROCEDURE — 99214 OFFICE O/P EST MOD 30 MIN: CPT | Performed by: NURSE PRACTITIONER

## 2022-04-14 RX ORDER — LEVETIRACETAM 500 MG/1
1000 TABLET ORAL 2 TIMES DAILY
Qty: 360 TABLET | Refills: 1 | Status: SHIPPED | OUTPATIENT
Start: 2022-04-14 | End: 2022-09-29 | Stop reason: SDUPTHER

## 2022-04-14 RX ORDER — LEVETIRACETAM 500 MG/1
TABLET ORAL
Qty: 120 TABLET | Refills: 0 | OUTPATIENT
Start: 2022-04-14

## 2022-04-14 NOTE — PROGRESS NOTES
"Chief Complaint  Follow-up, Hypertension, and Hyperlipidemia (Follow up )    Subjective          Sacha Arzola presents to [facility]  History of Present Illness  He is here for refill of his Keppra.  He is here with his daughter today.  He said that he does not recall ever having a history of seizures though when he had a stroke back in 2010 he started seeing things that were not there and they started him on Keppra at that time.  He goes to cardiology for his routine medicines though he is fasting for labs today.  He is going for a checkup of his eyes at the doctor coming up to see about getting his cataracts off.  He is refusing to quit smoking he smokes currently 1-1/2 packs a day.  He states \"that something I will not ever stop doing\"        Allergies  Patient has no known allergies.    Social History     Tobacco Use   • Smoking status: Current Every Day Smoker     Packs/day: 1.50     Years: 47.00     Pack years: 70.50     Types: Cigarettes     Start date: 1974   • Smokeless tobacco: Never Used   Vaping Use   • Vaping Use: Never used   Substance Use Topics   • Alcohol use: Yes     Comment: occ   • Drug use: Never       Family History   Problem Relation Age of Onset   • Mental illness Mother    • Heart disease Mother    • Other Father         parkinson        Health Maintenance Due   Topic Date Due   • Pneumococcal Vaccine 65+ (1 - PCV) Never done   • TDAP/TD VACCINES (1 - Tdap) Never done   • ZOSTER VACCINE (1 of 2) Never done   • COVID-19 Vaccine (2 - Booster for Alice series) 08/11/2021   • HEPATITIS C SCREENING  Never done        Immunization History   Administered Date(s) Administered   • COVID-19 (ALICE) 06/16/2021   • Fluzone High-Dose 65+yrs 10/11/2021       Review of Systems   Constitutional: Negative for fatigue.   Respiratory: Negative for cough and shortness of breath.    Cardiovascular: Negative for chest pain.   Gastrointestinal: Negative for diarrhea, " "nausea and vomiting.        Objective       Vitals:    04/14/22 0925   BP: 98/65   Pulse: 76   Resp: 22   Temp: 97.4 °F (36.3 °C)   SpO2: 97%   Weight: 99.7 kg (219 lb 11.2 oz)   Height: 177.8 cm (70\")       Body mass index is 31.52 kg/m².         Physical Exam  Vitals reviewed.   Constitutional:       Appearance: Normal appearance. He is well-developed.   Cardiovascular:      Rate and Rhythm: Normal rate. Rhythm irregular.      Heart sounds: Normal heart sounds. No murmur heard.  Pulmonary:      Effort: Pulmonary effort is normal.      Breath sounds: Wheezing present.   Neurological:      Mental Status: He is alert and oriented to person, place, and time.      Cranial Nerves: No cranial nerve deficit.      Motor: No weakness.   Psychiatric:         Mood and Affect: Mood and affect normal.             Result Review :     The following data was reviewed by: EVIN Strong on 04/14/2022:    Common labs    Common Labsle 10/11/21 10/11/21 10/11/21 10/11/21    0838 0838 0838 0838   Glucose  113 (A)     BUN  13     Creatinine  1.03     eGFR Non African Am  72     Sodium  135 (A)     Potassium  3.8     Chloride  98     Calcium  8.9     Albumin  3.70     Total Bilirubin  0.7     Alkaline Phosphatase  103     AST (SGOT)  18     ALT (SGPT)  18     WBC    7.73   Hemoglobin    19.8 (A)   Hematocrit    56.6 (A)   Platelets    208   Total Cholesterol 124      Triglycerides 66      HDL Cholesterol 41      LDL Cholesterol  69      PSA   0.682    (A) Abnormal value       Comments are available for some flowsheets but are not being displayed.                              Assessment and Plan      Diagnoses and all orders for this visit:    1. Seizures (HCC) (Primary)  Comments:  No recent seizures  Orders:  -     levETIRAcetam (KEPPRA) 500 MG tablet; Take 2 tablets by mouth 2 (Two) Times a Day.  Dispense: 360 tablet; Refill: 1    2. History of stroke    3. Essential hypertension  -     Comprehensive Metabolic Panel  -     " CBC & Differential  -     TSH  -     Lipid Panel            Follow Up     Return in about 6 months (around 10/14/2022).  Follow-up in 6 months for labs and appt. Call with any concerns or questions that you may have regarding your medications or history.    I have reviewed all medications and at this time no medications changes need to be adjusted for all chronic conditions.  I reviewed his family health history with him today and adjusted his stroke date.  Discussed with patient and daughter that the things that he was seeing when he had his stroke or potential seizure styles.  He has not had any epileptic or jerking motions at all.  Patient is doing well with his Keppra.  He does not need a full refill just yet he said he has about a months left but I went ahead and sent it to the pharmacy to keep along with our 6-month appointment.  Patient was given instructions and counseling regarding his condition or for health maintenance advice. Please see specific information pulled into the AVS if appropriate.     Sacha Wise Ness  reports that he has been smoking cigarettes. He started smoking about 48 years ago. He has a 70.50 pack-year smoking history. He has never used smokeless tobacco.. I have educated him on the risk of diseases from using tobacco products such as cancer, COPD and heart disease.     I advised him to quit and he is not willing to quit.    I spent 3  minutes counseling the patient.           Lee Ann Wilhelm, APRN  04/14/2022

## 2022-04-15 NOTE — PROGRESS NOTES
Pt was contacted by phone to notify that per PCP his labs looked good overall and just need to be rechecked in 6 months. Pt voiced understanding.

## 2022-06-15 ENCOUNTER — OFFICE VISIT (OUTPATIENT)
Dept: PULMONOLOGY | Facility: CLINIC | Age: 67
End: 2022-06-15

## 2022-06-15 VITALS
OXYGEN SATURATION: 95 % | RESPIRATION RATE: 17 BRPM | HEIGHT: 70 IN | WEIGHT: 209 LBS | BODY MASS INDEX: 29.92 KG/M2 | SYSTOLIC BLOOD PRESSURE: 98 MMHG | DIASTOLIC BLOOD PRESSURE: 59 MMHG

## 2022-06-15 DIAGNOSIS — F17.210 NICOTINE DEPENDENCE, CIGARETTES, UNCOMPLICATED: ICD-10-CM

## 2022-06-15 DIAGNOSIS — J44.9 CHRONIC OBSTRUCTIVE PULMONARY DISEASE, UNSPECIFIED COPD TYPE: Primary | ICD-10-CM

## 2022-06-15 DIAGNOSIS — I50.32 CHRONIC HEART FAILURE WITH PRESERVED EJECTION FRACTION (HFPEF): ICD-10-CM

## 2022-06-15 DIAGNOSIS — I10 ESSENTIAL HYPERTENSION: ICD-10-CM

## 2022-06-15 DIAGNOSIS — I28.1 PULMONARY ARTERY ANEURYSM: ICD-10-CM

## 2022-06-15 DIAGNOSIS — I48.20 ATRIAL FIBRILLATION, CHRONIC: ICD-10-CM

## 2022-06-15 DIAGNOSIS — J41.8 MIXED SIMPLE AND MUCOPURULENT CHRONIC BRONCHITIS: ICD-10-CM

## 2022-06-15 DIAGNOSIS — G47.33 OSA (OBSTRUCTIVE SLEEP APNEA): ICD-10-CM

## 2022-06-15 PROCEDURE — 99214 OFFICE O/P EST MOD 30 MIN: CPT | Performed by: NURSE PRACTITIONER

## 2022-06-15 RX ORDER — ALBUTEROL SULFATE 90 UG/1
2 AEROSOL, METERED RESPIRATORY (INHALATION) EVERY 4 HOURS PRN
Qty: 54 G | Refills: 3 | Status: SHIPPED | OUTPATIENT
Start: 2022-06-15 | End: 2022-12-15 | Stop reason: SDUPTHER

## 2022-06-15 RX ORDER — TIOTROPIUM BROMIDE AND OLODATEROL 3.124; 2.736 UG/1; UG/1
2 SPRAY, METERED RESPIRATORY (INHALATION)
Qty: 3 EACH | Refills: 4 | Status: SHIPPED | OUTPATIENT
Start: 2022-06-15 | End: 2022-12-15 | Stop reason: SDUPTHER

## 2022-06-15 NOTE — PROGRESS NOTES
Primary Care Provider  Lee Ann Wilhelm APRN     Referring Provider  No ref. provider found     Chief Complaint  COPD (6 month f/u )    Subjective          Sacha Arzola presents to [facility]  History of Present Illness  Sacha Arzola is a 67 y.o. male patient of Dr. Mccormick here for management of COPD, pulmonary artery aneurysm, pulmonary nodules, obstructive sleep apnea, mixed simple and mucopurulent chronic bronchitis, hypertension, chronic heart failure with preserved ejection fracture, atrial fibrillation and tobacco use of cigarettes ongoing.     Patient states he is doing well since his last visit.  He denies using any antibiotics or steroids for his lungs.  He denies any fevers or chills.  He does have an occasionally productive cough with normal colored phlegm.  He describes his shortness of breath as mild in severity, worse with exertion and improved with rest.  He continues to use Stiolto as prescribed.  Patient states he uses his albuterol inhaler once daily, but states he has gotten into a routine of using both of his inhalers at the same time.  Patient continues to smoke 1.5 packs of cigarettes daily and is not interested in quitting at this time.  Patient does have atrial fibrillation, congestive heart failure and hypertension, and is being managed by cardiology.  Patient is up-to-date with his flu, pneumonia and COVID vaccines.  He is able to perform his ADLs without difficulty.  Patient has no additional questions or concerns at this time.     His history of smoking is   Tobacco Use: High Risk   • Smoking Tobacco Use: Current Every Day Smoker   • Smokeless Tobacco Use: Never Used   .    Review of Systems   Constitutional: Negative for chills, fatigue, fever, unexpected weight gain and unexpected weight loss.   HENT: Negative for congestion (Nasal), hearing loss, mouth sores, nosebleeds, postnasal drip, sore throat and  trouble swallowing.    Eyes: Negative for blurred vision and visual disturbance.   Respiratory: Positive for cough and shortness of breath. Negative for apnea and wheezing.         Negative for Hemoptysis     Cardiovascular: Negative for chest pain, palpitations and leg swelling.   Gastrointestinal: Negative for abdominal pain, constipation, diarrhea, nausea, vomiting and GERD.        Negative for Jaundice  Negative for Bloating  Negative for Melena   Musculoskeletal: Negative for joint swelling and myalgias.        Negative for Joint pain  Negative for Joint stiffness   Skin: Negative for color change.        Negative for cyanosis   Neurological: Negative for syncope, weakness, numbness and headache.      Sleep: Negative for Excessive daytime sleepiness  Negative for morning headaches  Negative for Snoring    Family History   Problem Relation Age of Onset   • Mental illness Mother    • Heart disease Mother    • Other Father         parkinson        Social History     Socioeconomic History   • Marital status:    Tobacco Use   • Smoking status: Current Every Day Smoker     Packs/day: 1.50     Years: 47.00     Pack years: 70.50     Types: Cigarettes     Start date: 1974   • Smokeless tobacco: Never Used   Vaping Use   • Vaping Use: Never used   Substance and Sexual Activity   • Alcohol use: Yes     Comment: occ   • Drug use: Never   • Sexual activity: Defer        Past Medical History:   Diagnosis Date   • Atrial fibrillation, chronic 12/21/2021   • Chronic heart failure with preserved ejection fraction 12/05/2019   • COPD (chronic obstructive pulmonary disease) ([facility]) 12/05/2019   • CVA (cerebral vascular accident) ([facility]) 2010   • Essential hypertension 12/05/2019   • Fatigue 12/05/2019   • Peripheral vision loss 12/05/2019   • Positive colorectal cancer screening using Cologuard test    • 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   • Seizures ([facility])         Immunization History   Administered Date(s) Administered   •  COVID-19 (ARMANDO) 06/16/2021   • Flu Vaccine Quad PF >36MO 09/18/2018, 09/23/2020   • Fluzone High-Dose 65+yrs 10/11/2021   • Pneumococcal Polysaccharide (PPSV23) 02/02/2017         No Known Allergies       Current Outpatient Medications:   •  albuterol sulfate  (90 Base) MCG/ACT inhaler, Inhale 2 puffs Every 4 (Four) Hours As Needed for Wheezing., Disp: 54 g, Rfl: 3  •  apixaban (Eliquis) 5 MG tablet tablet, Take 1 tablet by mouth 2 (Two) Times a Day., Disp: 180 tablet, Rfl: 3  •  Aspirin Adult Low Strength 81 MG EC tablet, TAKE ONE TABLET BY MOUTH ONCE DAILY, Disp: 90 tablet, Rfl: 3  •  levETIRAcetam (KEPPRA) 500 MG tablet, Take 2 tablets by mouth 2 (Two) Times a Day., Disp: 360 tablet, Rfl: 1  •  lisinopril (PRINIVIL,ZESTRIL) 5 MG tablet, Take 1 tablet by mouth Daily., Disp: 90 tablet, Rfl: 2  •  metoprolol succinate XL (TOPROL-XL) 200 MG 24 hr tablet, Take 1 tablet by mouth Daily., Disp: 90 tablet, Rfl: 2  •  Multiple Vitamins-Minerals (PRESERVISION AREDS 2 PO), Take  by mouth., Disp: , Rfl:   •  omega-3 acid ethyl esters (LOVAZA) 1 g capsule, TAKE ONE CAPSULE BY MOUTH TWICE DAILY, Disp: 180 capsule, Rfl: 3  •  potassium chloride 10 MEQ CR tablet, Take 2 tablets by mouth Daily., Disp: 180 tablet, Rfl: 2  •  simvastatin (ZOCOR) 10 MG tablet, Take 1 tablet by mouth Daily With Breakfast., Disp: 90 tablet, Rfl: 2  •  tiotropium bromide-olodaterol (Stiolto Respimat) 2.5-2.5 MCG/ACT aerosol solution inhaler, Inhale 2 puffs Daily., Disp: 3 each, Rfl: 4  •  triamterene-hydrochlorothiazide (MAXZIDE-25) 37.5-25 MG per tablet, Take 1 tablet by mouth Daily., Disp: 90 tablet, Rfl: 2     Objective   Physical Exam  Constitutional:       General: He is not in acute distress.     Appearance: Normal appearance. He is normal weight.   HENT:      Right Ear: Hearing normal.      Left Ear: Hearing normal.      Nose: No nasal tenderness or congestion.      Mouth/Throat:      Mouth: Mucous membranes are moist. No oral lesions.  "  Eyes:      Extraocular Movements: Extraocular movements intact.      Pupils: Pupils are equal, round, and reactive to light.   Neck:      Thyroid: No thyroid mass or thyromegaly.   Cardiovascular:      Rate and Rhythm: Normal rate and regular rhythm.      Pulses: Normal pulses.      Heart sounds: Normal heart sounds. No murmur heard.  Pulmonary:      Effort: Pulmonary effort is normal.      Breath sounds: Examination of the right-lower field reveals decreased breath sounds. Examination of the left-lower field reveals decreased breath sounds. Decreased breath sounds present. No wheezing, rhonchi or rales.   Chest:   Breasts:      Right: No axillary adenopathy.       Abdominal:      General: Bowel sounds are normal. There is no distension.      Palpations: Abdomen is soft.      Tenderness: There is no abdominal tenderness.   Musculoskeletal:      Cervical back: Neck supple.      Right lower leg: No edema.      Left lower leg: No edema.   Lymphadenopathy:      Cervical: No cervical adenopathy.      Upper Body:      Right upper body: No axillary adenopathy.   Skin:     General: Skin is warm and dry.      Findings: No lesion or rash.   Neurological:      General: No focal deficit present.      Mental Status: He is alert and oriented to person, place, and time.      Cranial Nerves: Cranial nerves are intact.   Psychiatric:         Mood and Affect: Affect normal. Mood is not anxious or depressed.         Vital Signs:   BP 98/59 (BP Location: Left arm, Patient Position: Sitting, Cuff Size: Adult)   Resp 17   Ht 177.8 cm (70\")   Wt 94.8 kg (209 lb)   SpO2 95% Comment: room air  BMI 29.99 kg/m²        Result Review :     CMP    CMP 10/11/21 4/14/22   Glucose 113 (A) 111 (A)   BUN 13 9   Creatinine 1.03 0.98   eGFR Non African Am 72    Sodium 135 (A) 136   Potassium 3.8 3.9   Chloride 98 99   Calcium 8.9 9.1   Albumin 3.70 4.20   Total Bilirubin 0.7 0.7   Alkaline Phosphatase 103 92   AST (SGOT) 18 26   ALT (SGPT) 18 26 "   (A) Abnormal value       Comments are available for some flowsheets but are not being displayed.           CBC w/diff    CBC w/Diff 10/11/21 4/14/22   WBC 7.73 9.07   RBC 6.27 (A) 5.89 (A)   Hemoglobin 19.8 (A) 19.0 (A)   Hematocrit 56.6 (A) 55.3 (A)   MCV 90.3 93.9   MCH 31.6 32.3   MCHC 35.0 34.4   RDW 13.2 12.5   Platelets 208 210   Neutrophil Rel % 61.0 71.7   Immature Granulocyte Rel % 0.3 0.2   Lymphocyte Rel % 30.1 20.8   Monocyte Rel % 4.9 (A) 4.7 (A)   Eosinophil Rel % 2.3 1.7   Basophil Rel % 1.4 0.9   (A) Abnormal value            Data reviewed: Radiologic studies Chest CT 9/23/2021 and Dr. Mccormick's last office note   Procedures        Assessment and Plan    Diagnoses and all orders for this visit:    1. Chronic obstructive pulmonary disease, unspecified COPD type (HCC) (Primary)  -     albuterol sulfate  (90 Base) MCG/ACT inhaler; Inhale 2 puffs Every 4 (Four) Hours As Needed for Wheezing.  Dispense: 54 g; Refill: 3  -     tiotropium bromide-olodaterol (Stiolto Respimat) 2.5-2.5 MCG/ACT aerosol solution inhaler; Inhale 2 puffs Daily.  Dispense: 3 each; Refill: 4    2. Mixed simple and mucopurulent chronic bronchitis (HCC)    3. Atrial fibrillation, chronic    4. Chronic heart failure with preserved ejection fraction    5. Essential hypertension    6. KATHARINE (obstructive sleep apnea)    7. Pulmonary artery aneurysm (HCC)    8. Nicotine dependence, cigarettes, uncomplicated    9.  Continue Stiolto as prescribed.  Rinse mouth out after each use.  10.  Continue albuterol as needed.  11.  Patient is due for repeat CT scan in September, encouraged him to keep this appointment.  12. Smoking cessation counseling provided.  I spent 5 minutes today counseling patient on the risks of smoking, including throat cancer, lung cancer, COPD, heart disease and death.  Also discussed the benefits of quitting.  13.  Follow-up with Dr. Mccormick in 6 months, sooner if needed.        Follow Up   Return in about 6 months  (around 12/15/2022) for Recheck with Dr. Mccormick.  Patient was given instructions and counseling regarding his condition or for health maintenance advice. Please see specific information pulled into the AVS if appropriate.

## 2022-07-01 ENCOUNTER — TELEPHONE (OUTPATIENT)
Dept: FAMILY MEDICINE CLINIC | Facility: CLINIC | Age: 67
End: 2022-07-01

## 2022-07-01 NOTE — TELEPHONE ENCOUNTER
Im confused about your message. I do not see a medication that has been discontinued today...Is there something we need to do with this?

## 2022-07-20 ENCOUNTER — OFFICE VISIT (OUTPATIENT)
Dept: CARDIOLOGY | Facility: CLINIC | Age: 67
End: 2022-07-20

## 2022-07-20 VITALS
SYSTOLIC BLOOD PRESSURE: 112 MMHG | HEART RATE: 86 BPM | BODY MASS INDEX: 29.86 KG/M2 | WEIGHT: 208.6 LBS | DIASTOLIC BLOOD PRESSURE: 79 MMHG | HEIGHT: 70 IN

## 2022-07-20 DIAGNOSIS — E78.5 HYPERLIPIDEMIA LDL GOAL <70: ICD-10-CM

## 2022-07-20 DIAGNOSIS — I50.32 CHRONIC HEART FAILURE WITH PRESERVED EJECTION FRACTION (HFPEF): Primary | ICD-10-CM

## 2022-07-20 DIAGNOSIS — I48.20 ATRIAL FIBRILLATION, CHRONIC: ICD-10-CM

## 2022-07-20 DIAGNOSIS — I10 ESSENTIAL HYPERTENSION: ICD-10-CM

## 2022-07-20 PROCEDURE — 99214 OFFICE O/P EST MOD 30 MIN: CPT | Performed by: INTERNAL MEDICINE

## 2022-07-20 NOTE — PROGRESS NOTES
Chief Complaint  Atrial Fibrillation (Pt here for semiannual follow up)    Subjective    Patient doing well no new problems or issues with his been stable since last visit    Past Medical History:   Diagnosis Date   • Atrial fibrillation, chronic 12/21/2021   • Chronic heart failure with preserved ejection fraction 12/05/2019   • COPD (chronic obstructive pulmonary disease) ([facility]) 12/05/2019   • CVA (cerebral vascular accident) ([facility]) 2010   • Essential hypertension 12/05/2019   • Fatigue 12/05/2019   • Peripheral vision loss 12/05/2019   • Positive colorectal cancer screening using Cologuard test    • 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   • Seizures ([facility])          Current Outpatient Medications:   •  albuterol sulfate  (90 Base) MCG/ACT inhaler, Inhale 2 puffs Every 4 (Four) Hours As Needed for Wheezing., Disp: 54 g, Rfl: 3  •  apixaban (Eliquis) 5 MG tablet tablet, Take 1 tablet by mouth 2 (Two) Times a Day., Disp: 180 tablet, Rfl: 3  •  Aspirin Adult Low Strength 81 MG EC tablet, TAKE ONE TABLET BY MOUTH ONCE DAILY, Disp: 90 tablet, Rfl: 3  •  levETIRAcetam (KEPPRA) 500 MG tablet, Take 2 tablets by mouth 2 (Two) Times a Day., Disp: 360 tablet, Rfl: 1  •  lisinopril (PRINIVIL,ZESTRIL) 5 MG tablet, Take 1 tablet by mouth Daily., Disp: 90 tablet, Rfl: 2  •  metoprolol succinate XL (TOPROL-XL) 200 MG 24 hr tablet, Take 1 tablet by mouth Daily., Disp: 90 tablet, Rfl: 2  •  Multiple Vitamins-Minerals (PRESERVISION AREDS 2 PO), Take  by mouth., Disp: , Rfl:   •  omega-3 acid ethyl esters (LOVAZA) 1 g capsule, TAKE ONE CAPSULE BY MOUTH TWICE DAILY, Disp: 180 capsule, Rfl: 3  •  potassium chloride 10 MEQ CR tablet, Take 2 tablets by mouth Daily., Disp: 180 tablet, Rfl: 2  •  simvastatin (ZOCOR) 10 MG tablet, Take 1 tablet by mouth Daily With Breakfast., Disp: 90 tablet, Rfl: 2  •  tiotropium bromide-olodaterol (Stiolto Respimat) 2.5-2.5 MCG/ACT aerosol solution inhaler, Inhale 2 puffs Daily., Disp: 3 each, Rfl: 4  •  " triamterene-hydrochlorothiazide (MAXZIDE-25) 37.5-25 MG per tablet, Take 1 tablet by mouth Daily., Disp: 90 tablet, Rfl: 2    There are no discontinued medications.  No Known Allergies     Social History     Tobacco Use   • Smoking status: Current Every Day Smoker     Packs/day: 1.50     Years: 47.00     Pack years: 70.50     Types: Cigarettes     Start date: 1974   • Smokeless tobacco: Never Used   Vaping Use   • Vaping Use: Never used   Substance Use Topics   • Alcohol use: Yes     Comment: occ   • Drug use: Never       Family History   Problem Relation Age of Onset   • Mental illness Mother    • Heart disease Mother    • Other Father         parkinson        Objective     /79 (BP Location: Left arm, Patient Position: Sitting, Cuff Size: Adult)   Pulse 86   Ht 177.8 cm (70\")   Wt 94.6 kg (208 lb 9.6 oz)   BMI 29.93 kg/m²       Physical Exam    General Appearance:   · no acute distress  · Alert and oriented x3  HENT:   · lips not cyanotic  · Atraumatic  Neck:  · No jvd   · supple  Respiratory:  · no respiratory distress  · normal breath sounds  · no rales  Cardiovascular:  · Irregularly irregular  · no S3, no S4   · no murmur  · no rub  Extremities  · No cyanosis  · lower extremity edema: none    Skin:   · warm, dry  · No rashes      Result Review :     No results found for: PROBNP  CMP    CMP 10/11/21 4/14/22   Glucose 113 (A) 111 (A)   BUN 13 9   Creatinine 1.03 0.98   eGFR Non African Am 72    Sodium 135 (A) 136   Potassium 3.8 3.9   Chloride 98 99   Calcium 8.9 9.1   Albumin 3.70 4.20   Total Bilirubin 0.7 0.7   Alkaline Phosphatase 103 92   AST (SGOT) 18 26   ALT (SGPT) 18 26   (A) Abnormal value       Comments are available for some flowsheets but are not being displayed.           CBC w/diff    CBC w/Diff 10/11/21 4/14/22   WBC 7.73 9.07   RBC 6.27 (A) 5.89 (A)   Hemoglobin 19.8 (A) 19.0 (A)   Hematocrit 56.6 (A) 55.3 (A)   MCV 90.3 93.9   MCH 31.6 32.3   MCHC 35.0 34.4   RDW 13.2 12.5   Platelets " 208 210   Neutrophil Rel % 61.0 71.7   Immature Granulocyte Rel % 0.3 0.2   Lymphocyte Rel % 30.1 20.8   Monocyte Rel % 4.9 (A) 4.7 (A)   Eosinophil Rel % 2.3 1.7   Basophil Rel % 1.4 0.9   (A) Abnormal value             Lab Results   Component Value Date    TSH 2.160 04/14/2022      No results found for: FREET4   No results found for: DDIMERQUANT  No results found for: MG   No results found for: DIGOXIN   No results found for: TROPONINT        Lipid Panel    Lipid Panel 10/11/21 4/14/22   Total Cholesterol 124 116   Triglycerides 66 71   HDL Cholesterol 41 43   VLDL Cholesterol 14 15   LDL Cholesterol  69 58   LDL/HDL Ratio 1.70 1.37           No results found for: POCTROP    Results for orders placed in visit on 03/21/22    Adult Transthoracic Echo Complete W/ Cont if Necessary Per Protocol    Interpretation Summary  · Calculated left ventricular EF = 63% Estimated left ventricular EF was in agreement with the calculated left ventricular EF.  · The right ventricular cavity is moderately dilated.  · The right atrial cavity is moderately dilated.  · D-shaped compression of the interventricular septum consistent with RV pressure volume overload                 Diagnoses and all orders for this visit:    1. Chronic heart failure with preserved ejection fraction (Primary)  Assessment & Plan:  Patient is doing well denies any problems with worsening shortness of breath weight gain or edema continue with triamterene hydrochlorothiazide for diuretic      2. Essential hypertension  Assessment & Plan:  Patient with well-controlled blood pressure continue with triamterene hydrochlorothiazide and Toprol 20 mg once a day      3. Atrial fibrillation, chronic  Assessment & Plan:  Patient is rate controlled on Eliquis 5 twice daily for CVA prevention      4. Hyperlipidemia LDL goal <70          Follow Up     Return in about 6 months (around 1/20/2023) for EKG with F/U.          Patient was given instructions and counseling  regarding his condition or for health maintenance advice. Please see specific information pulled into the AVS if appropriate.

## 2022-07-20 NOTE — ASSESSMENT & PLAN NOTE
Patient with well-controlled blood pressure continue with triamterene hydrochlorothiazide and Toprol 20 mg once a day

## 2022-07-20 NOTE — ASSESSMENT & PLAN NOTE
Patient is doing well denies any problems with worsening shortness of breath weight gain or edema continue with triamterene hydrochlorothiazide for diuretic

## 2022-09-09 RX ORDER — POTASSIUM CHLORIDE 750 MG/1
TABLET, FILM COATED, EXTENDED RELEASE ORAL
Qty: 180 TABLET | Refills: 1 | Status: SHIPPED | OUTPATIENT
Start: 2022-09-09 | End: 2023-02-02 | Stop reason: SDUPTHER

## 2022-09-09 RX ORDER — LISINOPRIL 5 MG/1
TABLET ORAL
Qty: 90 TABLET | Refills: 1 | Status: SHIPPED | OUTPATIENT
Start: 2022-09-09 | End: 2023-02-02 | Stop reason: SDUPTHER

## 2022-09-09 RX ORDER — SIMVASTATIN 10 MG
TABLET ORAL
Qty: 90 TABLET | Refills: 1 | Status: SHIPPED | OUTPATIENT
Start: 2022-09-09 | End: 2023-02-02 | Stop reason: SDUPTHER

## 2022-09-09 RX ORDER — TRIAMTERENE AND HYDROCHLOROTHIAZIDE 37.5; 25 MG/1; MG/1
TABLET ORAL
Qty: 90 TABLET | Refills: 1 | Status: SHIPPED | OUTPATIENT
Start: 2022-09-09 | End: 2023-02-02 | Stop reason: SDUPTHER

## 2022-09-09 RX ORDER — METOPROLOL SUCCINATE 200 MG/1
TABLET, EXTENDED RELEASE ORAL
Qty: 90 TABLET | Refills: 1 | Status: SHIPPED | OUTPATIENT
Start: 2022-09-09 | End: 2023-02-02 | Stop reason: SDUPTHER

## 2022-09-15 ENCOUNTER — OFFICE VISIT (OUTPATIENT)
Dept: SLEEP MEDICINE | Facility: HOSPITAL | Age: 67
End: 2022-09-15

## 2022-09-15 VITALS
WEIGHT: 204.8 LBS | SYSTOLIC BLOOD PRESSURE: 97 MMHG | DIASTOLIC BLOOD PRESSURE: 58 MMHG | HEART RATE: 48 BPM | BODY MASS INDEX: 29.32 KG/M2 | HEIGHT: 70 IN | OXYGEN SATURATION: 99 %

## 2022-09-15 DIAGNOSIS — E66.3 OVERWEIGHT WITH BODY MASS INDEX (BMI) 25.0-29.9: ICD-10-CM

## 2022-09-15 DIAGNOSIS — G47.33 OBSTRUCTIVE SLEEP APNEA: Primary | ICD-10-CM

## 2022-09-15 DIAGNOSIS — J44.9 CHRONIC OBSTRUCTIVE PULMONARY DISEASE, UNSPECIFIED COPD TYPE: ICD-10-CM

## 2022-09-15 PROCEDURE — 99213 OFFICE O/P EST LOW 20 MIN: CPT | Performed by: FAMILY MEDICINE

## 2022-09-15 NOTE — PROGRESS NOTES
Follow Up [facility] Note     Chief Complaint:  KATHARINE     Primary Care Physician: Lee Ann Wilhelm APRN Richard Frandy Arzola is a 67 y.o.male  was last seen at [facility] sleep lab: 2/17/2022.  Patient has KATHARINE with underlying COPD.  Sleep study March 2016 showed overall AHI of 134 with hypoxemia.  In 2021 new machine was ordered because current machine was broken beyond repair.  At last visit followed up since receiving new machine.  Patient was doing well presents today for 6-month follow-up.  Bedtime wake time varies.  No issues with mask or machine.  Fullface mask which fits well does not leak air.    Results Review:  DME is [facility].  Downloads between 8/15/2022 - 9/13/2022.  Average usage is 10 hours 36 minutes.  Average AHI is 0.9.  Average AutoCPAP pressure is 12.1 cm H2O.    Current Medications:    Current Outpatient Medications:   •  albuterol sulfate  (90 Base) MCG/ACT inhaler, Inhale 2 puffs Every 4 (Four) Hours As Needed for Wheezing., Disp: 54 g, Rfl: 3  •  apixaban (Eliquis) 5 MG tablet tablet, Take 1 tablet by mouth 2 (Two) Times a Day., Disp: 180 tablet, Rfl: 1  •  Aspirin Adult Low Strength 81 MG EC tablet, TAKE ONE TABLET BY MOUTH ONCE DAILY, Disp: 90 tablet, Rfl: 3  •  levETIRAcetam (KEPPRA) 500 MG tablet, Take 2 tablets by mouth 2 (Two) Times a Day., Disp: 360 tablet, Rfl: 1  •  lisinopril (PRINIVIL,ZESTRIL) 5 MG tablet, TAKE ONE TABLET BY MOUTH DAILY, Disp: 90 tablet, Rfl: 1  •  metoprolol succinate XL (TOPROL-XL) 200 MG 24 hr tablet, TAKE ONE TABLET BY MOUTH DAILY, Disp: 90 tablet, Rfl: 1  •  Multiple Vitamins-Minerals (PRESERVISION AREDS 2 PO), Take  by mouth., Disp: , Rfl:   •  omega-3 acid ethyl esters (LOVAZA) 1 g capsule, TAKE ONE CAPSULE BY MOUTH TWICE DAILY, Disp: 180 capsule, Rfl: 3  •  potassium chloride 10 MEQ CR tablet, TAKE TWO TABLETS BY MOUTH DAILY, Disp: 180 tablet, Rfl: 1  •  simvastatin (ZOCOR) 10 MG tablet, TAKE ONE TABLET BY MOUTH DAILY WITH breakfast,  "Disp: 90 tablet, Rfl: 1  •  tiotropium bromide-olodaterol (Stiolto Respimat) 2.5-2.5 MCG/ACT aerosol solution inhaler, Inhale 2 puffs Daily., Disp: 3 each, Rfl: 4  •  triamterene-hydrochlorothiazide (MAXZIDE-25) 37.5-25 MG per tablet, TAKE ONE TABLET BY MOUTH DAILY, Disp: 90 tablet, Rfl: 1   also entered in Sleep Questionnaire    Patient  has a past medical history of Atrial fibrillation, chronic (12/21/2021), Chronic heart failure with preserved ejection fraction (12/05/2019), COPD (chronic obstructive pulmonary disease) ([facility]) (12/05/2019), CVA (cerebral vascular accident) ([facility]) (2010), Essential hypertension (12/05/2019), Fatigue (12/05/2019), Peripheral vision loss (12/05/2019), Positive colorectal cancer screening using Cologuard test, 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    Social History:    Social History     Socioeconomic History   • Marital status:    Tobacco Use   • Smoking status: Current Every Day Smoker     Packs/day: 1.50     Years: 47.00     Pack years: 70.50     Types: Cigarettes     Start date: 1974   • Smokeless tobacco: Never Used   Vaping Use   • Vaping Use: Never used   Substance and Sexual Activity   • Alcohol use: Yes     Comment: occ   • Drug use: Never   • Sexual activity: Defer       Allergies:  Patient has no known allergies.    Vital Signs:    Vitals:    09/15/22 1100   BP: 97/58   Pulse: (!) 48   SpO2: 99%   Weight: 92.9 kg (204 lb 12.8 oz)   Height: 177.8 cm (70\")     Body mass index is 29.39 kg/m².    REVIEW OF SYSTEMS.  Full review of systems available on the intake form which is scanned in the media tab.  The relevant positive are noted below  1. Daytime excessive sleepiness with [place] Sleepiness Scale :Total score: 3   2. Snoring  3. Cough shortness of breath nasal congestion      Physical exam:  Vitals:    09/15/22 1100   BP: 97/58   Pulse: (!) 48   SpO2: 99%   Weight: 92.9 kg (204 lb 12.8 oz)   Height: 177.8 cm (70\")    Body mass index is 29.39 " kg/m².    HEENT: Head is atraumatic, normocephalic  Eyes: pupils are round equal and reacting to light and accommodation, conjunctiva normal  RESPIRATORY SYSTEM: Regular respirations  CARDIOVASULAR SYSTEM: Slightly bradycardic rate  EXTREMITES: No cyanosis, clubbing  NEUROLOGICAL SYSTEM: Oriented x 3, no gross motor defects, gait normal    Impression:  1. Obstructive sleep apnea    2. Chronic obstructive pulmonary disease, unspecified COPD type (HCC)    3. Overweight with body mass index (BMI) 25.0-29.9        Obstructive sleep apnea adequately treated with auto CPAP 12 to 20 cm H2O with good compliance and usage and no complaints of hypersomnolence. Manual recheck of HR 53 bpm. RTC in 1 year for follow up or sooner if needed.    Patient uses the CPAP device and benefits from its use in terms of reduction of hypersomnia and snoring.Weight loss will be strongly beneficial to reduce the severity of sleep-disordered breathing.  Caution during activities that require prolonged concentration is strongly advised if sleepiness returns. Changing of PAP supplies regularly is important for effective use. Patient needs to change cushion on the mask or plugs on nasal pillows along with disposable filters once every month and change mask frame, tubing, headgear and Velcro straps every 6 months at the minimum.    Binh Morrow MD  Sleep Medicine  09/15/22  11:53 EDT

## 2022-09-23 ENCOUNTER — HOSPITAL ENCOUNTER (OUTPATIENT)
Dept: CT IMAGING | Facility: HOSPITAL | Age: 67
Discharge: HOME | End: 2022-09-23
Admitting: NURSE PRACTITIONER

## 2022-09-23 DIAGNOSIS — R91.8 LUNG NODULES: ICD-10-CM

## 2022-09-23 PROCEDURE — 71250 CT THORAX DX C-: CPT

## 2022-09-29 ENCOUNTER — OFFICE VISIT (OUTPATIENT)
Dept: FAMILY MEDICINE CLINIC | Facility: CLINIC | Age: 67
End: 2022-09-29

## 2022-09-29 VITALS
OXYGEN SATURATION: 99 % | DIASTOLIC BLOOD PRESSURE: 64 MMHG | HEART RATE: 67 BPM | TEMPERATURE: 96.2 F | BODY MASS INDEX: 29.54 KG/M2 | SYSTOLIC BLOOD PRESSURE: 100 MMHG | RESPIRATION RATE: 24 BRPM | HEIGHT: 70 IN | WEIGHT: 206.3 LBS

## 2022-09-29 DIAGNOSIS — Z23 NEED FOR VACCINATION: ICD-10-CM

## 2022-09-29 DIAGNOSIS — Z11.59 ENCOUNTER FOR HEPATITIS C SCREENING TEST FOR LOW RISK PATIENT: ICD-10-CM

## 2022-09-29 DIAGNOSIS — I10 ESSENTIAL HYPERTENSION: ICD-10-CM

## 2022-09-29 DIAGNOSIS — Z12.5 SCREENING PSA (PROSTATE SPECIFIC ANTIGEN): ICD-10-CM

## 2022-09-29 DIAGNOSIS — F17.219 CIGARETTE NICOTINE DEPENDENCE WITH NICOTINE-INDUCED DISORDER: ICD-10-CM

## 2022-09-29 DIAGNOSIS — Z00.00 ENCOUNTER FOR ANNUAL WELLNESS EXAM IN MEDICARE PATIENT: Primary | ICD-10-CM

## 2022-09-29 DIAGNOSIS — R56.9 SEIZURES: ICD-10-CM

## 2022-09-29 DIAGNOSIS — Z23 NEED FOR INFLUENZA VACCINATION: ICD-10-CM

## 2022-09-29 LAB
ALBUMIN SERPL-MCNC: 3.8 G/DL (ref 3.5–5.2)
ALBUMIN/GLOB SERPL: 1.2 G/DL
ALP SERPL-CCNC: 125 U/L (ref 39–117)
ALT SERPL W P-5'-P-CCNC: 23 U/L (ref 1–41)
ANION GAP SERPL CALCULATED.3IONS-SCNC: 10.4 MMOL/L (ref 5–15)
AST SERPL-CCNC: 26 U/L (ref 1–40)
BASOPHILS # BLD AUTO: 0.05 10*3/MM3 (ref 0–0.2)
BASOPHILS NFR BLD AUTO: 0.5 % (ref 0–1.5)
BILIRUB SERPL-MCNC: 0.5 MG/DL (ref 0–1.2)
BUN SERPL-MCNC: 9 MG/DL (ref 8–23)
BUN/CREAT SERPL: 11.1 (ref 7–25)
CALCIUM SPEC-SCNC: 9.2 MG/DL (ref 8.6–10.5)
CHLORIDE SERPL-SCNC: 96 MMOL/L (ref 98–107)
CHOLEST SERPL-MCNC: 102 MG/DL (ref 0–200)
CO2 SERPL-SCNC: 30.6 MMOL/L (ref 22–29)
CREAT SERPL-MCNC: 0.81 MG/DL (ref 0.76–1.27)
DEPRECATED RDW RBC AUTO: 41.4 FL (ref 37–54)
EGFRCR SERPLBLD CKD-EPI 2021: 97.2 ML/MIN/1.73
EOSINOPHIL # BLD AUTO: 0.12 10*3/MM3 (ref 0–0.4)
EOSINOPHIL NFR BLD AUTO: 1.3 % (ref 0.3–6.2)
ERYTHROCYTE [DISTWIDTH] IN BLOOD BY AUTOMATED COUNT: 12 % (ref 12.3–15.4)
GLOBULIN UR ELPH-MCNC: 3.3 GM/DL
GLUCOSE SERPL-MCNC: 107 MG/DL (ref 65–99)
HCT VFR BLD AUTO: 52 % (ref 37.5–51)
HCV AB SER DONR QL: NORMAL
HDLC SERPL-MCNC: 47 MG/DL (ref 40–60)
HGB BLD-MCNC: 17.6 G/DL (ref 13–17.7)
IMM GRANULOCYTES # BLD AUTO: 0.04 10*3/MM3 (ref 0–0.05)
IMM GRANULOCYTES NFR BLD AUTO: 0.4 % (ref 0–0.5)
LDLC SERPL CALC-MCNC: 41 MG/DL (ref 0–100)
LDLC/HDLC SERPL: 0.91 {RATIO}
LYMPHOCYTES # BLD AUTO: 2.23 10*3/MM3 (ref 0.7–3.1)
LYMPHOCYTES NFR BLD AUTO: 24 % (ref 19.6–45.3)
MCH RBC QN AUTO: 31.5 PG (ref 26.6–33)
MCHC RBC AUTO-ENTMCNC: 33.8 G/DL (ref 31.5–35.7)
MCV RBC AUTO: 93 FL (ref 79–97)
MONOCYTES # BLD AUTO: 0.42 10*3/MM3 (ref 0.1–0.9)
MONOCYTES NFR BLD AUTO: 4.5 % (ref 5–12)
NEUTROPHILS NFR BLD AUTO: 6.42 10*3/MM3 (ref 1.7–7)
NEUTROPHILS NFR BLD AUTO: 69.3 % (ref 42.7–76)
NRBC BLD AUTO-RTO: 0 /100 WBC (ref 0–0.2)
PLATELET # BLD AUTO: 184 10*3/MM3 (ref 140–450)
PMV BLD AUTO: 11.8 FL (ref 6–12)
POTASSIUM SERPL-SCNC: 4 MMOL/L (ref 3.5–5.2)
PROT SERPL-MCNC: 7.1 G/DL (ref 6–8.5)
PSA SERPL-MCNC: 0.73 NG/ML (ref 0–4)
RBC # BLD AUTO: 5.59 10*6/MM3 (ref 4.14–5.8)
SODIUM SERPL-SCNC: 137 MMOL/L (ref 136–145)
TRIGL SERPL-MCNC: 60 MG/DL (ref 0–150)
TSH SERPL DL<=0.05 MIU/L-ACNC: 1.83 UIU/ML (ref 0.27–4.2)
VLDLC SERPL-MCNC: 14 MG/DL (ref 5–40)
WBC NRBC COR # BLD: 9.28 10*3/MM3 (ref 3.4–10.8)

## 2022-09-29 PROCEDURE — G0103 PSA SCREENING: HCPCS | Performed by: NURSE PRACTITIONER

## 2022-09-29 PROCEDURE — 1170F FXNL STATUS ASSESSED: CPT | Performed by: NURSE PRACTITIONER

## 2022-09-29 PROCEDURE — 80053 COMPREHEN METABOLIC PANEL: CPT | Performed by: NURSE PRACTITIONER

## 2022-09-29 PROCEDURE — 80061 LIPID PANEL: CPT | Performed by: NURSE PRACTITIONER

## 2022-09-29 PROCEDURE — 85025 COMPLETE CBC W/AUTO DIFF WBC: CPT | Performed by: NURSE PRACTITIONER

## 2022-09-29 PROCEDURE — 84443 ASSAY THYROID STIM HORMONE: CPT | Performed by: NURSE PRACTITIONER

## 2022-09-29 PROCEDURE — 86803 HEPATITIS C AB TEST: CPT | Performed by: NURSE PRACTITIONER

## 2022-09-29 PROCEDURE — 80177 DRUG SCRN QUAN LEVETIRACETAM: CPT | Performed by: NURSE PRACTITIONER

## 2022-09-29 PROCEDURE — G0439 PPPS, SUBSEQ VISIT: HCPCS | Performed by: NURSE PRACTITIONER

## 2022-09-29 PROCEDURE — 1159F MED LIST DOCD IN RCRD: CPT | Performed by: NURSE PRACTITIONER

## 2022-09-29 PROCEDURE — 90677 PCV20 VACCINE IM: CPT | Performed by: NURSE PRACTITIONER

## 2022-09-29 PROCEDURE — 90686 IIV4 VACC NO PRSV 0.5 ML IM: CPT | Performed by: NURSE PRACTITIONER

## 2022-09-29 PROCEDURE — G0009 ADMIN PNEUMOCOCCAL VACCINE: HCPCS | Performed by: NURSE PRACTITIONER

## 2022-09-29 PROCEDURE — G0008 ADMIN INFLUENZA VIRUS VAC: HCPCS | Performed by: NURSE PRACTITIONER

## 2022-09-29 RX ORDER — LEVETIRACETAM 500 MG/1
1000 TABLET ORAL 2 TIMES DAILY
Qty: 360 TABLET | Refills: 1 | Status: SHIPPED | OUTPATIENT
Start: 2022-09-29 | End: 2023-03-29 | Stop reason: SDUPTHER

## 2022-09-29 NOTE — PROGRESS NOTES
The ABCs of the Annual Wellness Visit  Subsequent Medicare Wellness Visit    Chief Complaint   Patient presents with   • COPD   • Hypertension   • Hyperlipidemia   • Medicare Wellness-subsequent      Subjective    History of Present Illness:  Sacha Arzola is a 67 y.o. male who presents for a Subsequent Medicare Wellness Visit.    The following portions of the patient's history were reviewed and   updated as appropriate: allergies, current medications, past family history, past medical history, past social history, past surgical history and problem list.    Compared to one year ago, the patient feels his physical   health is the same.    Compared to one year ago, the patient feels his mental   health is the same.    He has not had any seizures recently.  He takes his medicines.  His daughter is with him today who said he is a little bit more on the contrary side.  He is wanting his annual wellness visit done today.  I did discuss that we will do it today though I am not certain that insurance will cover it as it is not a year and a day but he does have [facility] which we believe is yearly not year today.  He is still smoking.  He does not wish to change any of his breathing medicine.  He sees cardiology for his regular medicines.  He is fasting for labs today.    Recent Hospitalizations:  He was not admitted to the hospital during the last year.       Current Medical Providers:  Patient Care Team:  Lee Ann Wilhelm APRN as PCP - General (Nurse Practitioner)    Outpatient Medications Prior to Visit   Medication Sig Dispense Refill   • albuterol sulfate  (90 Base) MCG/ACT inhaler Inhale 2 puffs Every 4 (Four) Hours As Needed for Wheezing. 54 g 3   • apixaban (Eliquis) 5 MG tablet tablet Take 1 tablet by mouth 2 (Two) Times a Day. 180 tablet 1   • Aspirin Adult Low Strength 81 MG EC tablet TAKE ONE TABLET BY MOUTH ONCE DAILY 90 tablet 3   • lisinopril (PRINIVIL,ZESTRIL) 5 MG tablet TAKE ONE  TABLET BY MOUTH DAILY 90 tablet 1   • metoprolol succinate XL (TOPROL-XL) 200 MG 24 hr tablet TAKE ONE TABLET BY MOUTH DAILY 90 tablet 1   • Multiple Vitamins-Minerals (PRESERVISION AREDS 2 PO) Take  by mouth.     • omega-3 acid ethyl esters (LOVAZA) 1 g capsule TAKE ONE CAPSULE BY MOUTH TWICE DAILY 180 capsule 3   • potassium chloride 10 MEQ CR tablet TAKE TWO TABLETS BY MOUTH DAILY 180 tablet 1   • simvastatin (ZOCOR) 10 MG tablet TAKE ONE TABLET BY MOUTH DAILY WITH breakfast 90 tablet 1   • tiotropium bromide-olodaterol (Stiolto Respimat) 2.5-2.5 MCG/ACT aerosol solution inhaler Inhale 2 puffs Daily. 3 each 4   • triamterene-hydrochlorothiazide (MAXZIDE-25) 37.5-25 MG per tablet TAKE ONE TABLET BY MOUTH DAILY 90 tablet 1   • levETIRAcetam (KEPPRA) 500 MG tablet Take 2 tablets by mouth 2 (Two) Times a Day. 360 tablet 1     No facility-administered medications prior to visit.       No opioid medication identified on active medication list. I have reviewed chart for other potential  high risk medication/s and harmful drug interactions in the elderly.          Aspirin is on active medication list. Aspirin use is indicated based on review of current medical condition/s. Pros and cons of this therapy have been discussed today. Benefits of this medication outweigh potential harm.  Patient has been encouraged to continue taking this medication.  .      Patient Active Problem List   Diagnosis   • Chronic heart failure with preserved ejection fraction   • COPD (chronic obstructive pulmonary disease) (HCC)   • Essential hypertension   • Fatigue   • Hyperlipidemia LDL goal <70   • Peripheral vision loss   • Seizures (HCC)   • Cigarette nicotine dependence with nicotine-induced disorder   • Positive colorectal cancer screening using Cologuard test   • Atrial fibrillation, chronic   • History of stroke     Advance Care Planning  Advance Directive is not on file.  ACP discussion was declined by the patient. Patient does not  "have an advance directive, information provided.          Objective    Vitals:    09/29/22 0704   BP: 100/64   Pulse: 67   Resp: 24   Temp: 96.2 °F (35.7 °C)   SpO2: 99%   Weight: 93.6 kg (206 lb 4.8 oz)   Height: 177.8 cm (70\")     Estimated body mass index is 29.6 kg/m² as calculated from the following:    Height as of this encounter: 177.8 cm (70\").    Weight as of this encounter: 93.6 kg (206 lb 4.8 oz).    BMI is >= 25 and <30. (Overweight) The following options were offered after discussion;: nutrition counseling/recommendations      Does the patient have evidence of cognitive impairment? No    Physical Exam  Vitals reviewed.   Constitutional:       Appearance: Normal appearance. He is well-developed.   Cardiovascular:      Rate and Rhythm: Normal rate and regular rhythm.      Heart sounds: Normal heart sounds. No murmur heard.  Pulmonary:      Effort: Pulmonary effort is normal.      Breath sounds: Normal breath sounds.   Neurological:      Mental Status: He is alert and oriented to person, place, and time.      Cranial Nerves: No cranial nerve deficit.      Motor: No weakness.   Psychiatric:         Mood and Affect: Mood and affect normal.                 HEALTH RISK ASSESSMENT    Smoking Status:  Social History     Tobacco Use   Smoking Status Current Every Day Smoker   • Packs/day: 1.50   • Years: 47.00   • Pack years: 70.50   • Start date: 1974   Smokeless Tobacco Never Used     Alcohol Consumption:  Social History     Substance and Sexual Activity   Alcohol Use Yes    Comment: occ     Fall Risk Screen:    STEADI Fall Risk Assessment was completed, and patient is at LOW risk for falls.Assessment completed on:9/29/2022    Depression Screening:  PHQ-2/PHQ-9 Depression Screening 9/29/2022   Retired PHQ-9 Total Score -   Retired Total Score -   Little Interest or Pleasure in Doing Things 0-->not at all   Feeling Down, Depressed or Hopeless 0-->not at all   PHQ-9: Brief Depression Severity Measure Score 0 "       Health Habits and Functional and Cognitive Screening:  Functional & Cognitive Status 9/29/2022   Do you have difficulty preparing food and eating? No   Do you have difficulty bathing yourself, getting dressed or grooming yourself? No   Do you have difficulty using the toilet? No   Do you have difficulty moving around from place to place? No   Do you have trouble with steps or getting out of a bed or a chair? No   Current Diet Unhealthy Diet   Dental Exam Not up to date   Eye Exam Up to date   Exercise (times per week) 0 times per week   Current Exercises Include No Regular Exercise   Do you need help using the phone?  No   Are you deaf or do you have serious difficulty hearing?  Yes   Do you need help with transportation? No   Do you need help shopping? No   Do you need help preparing meals?  No   Do you need help with housework?  No   Do you need help with laundry? No   Do you need help taking your medications? No   Do you need help managing money? No   Do you ever drive or ride in a car without wearing a seat belt? No   Have you felt unusual stress, anger or loneliness in the last month? No   Who do you live with? Alone   If you need help, do you have trouble finding someone available to you? No   Have you been bothered in the last four weeks by sexual problems? No   Do you have difficulty concentrating, remembering or making decisions? No       Age-appropriate Screening Schedule:  Refer to the list below for future screening recommendations based on patient's age, sex and/or medical conditions. Orders for these recommended tests are listed in the plan section. The patient has been provided with a written plan.    Health Maintenance   Topic Date Due   • ZOSTER VACCINE (1 of 2) Never done   • TDAP/TD VACCINES (1 - Tdap) 10/02/2023 (Originally 2/16/1974)   • LIPID PANEL  04/14/2023   • INFLUENZA VACCINE  Completed              Assessment & Plan   CMS Preventative Services Quick Reference  Risk Factors  Identified During Encounter  Cardiovascular Disease  Chronic Pain   Depression/Dysphoria  Fall Risk-High or Moderate  Immunizations Discussed/Encouraged (specific Immunizations; Tdap, Shingrix and COVID19  Tobacco Use/Dependance (use dotphrase .tobaccocessation for documentation)  The above risks/problems have been discussed with the patient.  Follow up actions/plans if indicated are seen below in the Assessment/Plan Section.  Pertinent information has been shared with the patient in the After Visit Summary.    Diagnoses and all orders for this visit:    1. Encounter for annual wellness exam in Medicare patient (Primary)    2. Need for vaccination  -     Pneumococcal Conjugate Vaccine 20-Valent All    3. Seizures (HCC)  -     levETIRAcetam (KEPPRA) 500 MG tablet; Take 2 tablets by mouth 2 (Two) Times a Day.  Dispense: 360 tablet; Refill: 1  -     Levetiracetam Level (Keppra)    4. Essential hypertension  -     Comprehensive Metabolic Panel  -     CBC & Differential  -     TSH  -     Lipid Panel    5. Screening PSA (prostate specific antigen)  -     PSA Screen    6. Cigarette nicotine dependence with nicotine-induced disorder    7. Encounter for hepatitis C screening test for low risk patient  -     Hepatitis C antibody    8. Need for influenza vaccination  -     FluLaval/Fluarix/Fluzone >6 Months        Follow Up:   Return in about 6 months (around 3/29/2023).   Follow-up in 6 months for labs and appt. Call with any concerns or questions that you may have regarding your medications or history.    He is not willing to stop smoking.    He will think about aaa next year.    An After Visit Summary and PPPS were made available to the patient.          Sacha Arzola  reports that he has been smoking. He started smoking about 48 years ago. He has a 70.50 pack-year smoking history. He has never used smokeless tobacco.. I have educated him on the risk of diseases from using tobacco products such as cancer, COPD and  heart disease.     I advised him to quit and he is not willing to quit.    I spent 3  minutes counseling the patient.     Lee Ann Wilhelm, APRN  09/29/2022

## 2022-10-03 LAB — LEVETIRACETAM SERPL-MCNC: 49.8 UG/ML (ref 10–40)

## 2022-12-15 ENCOUNTER — OFFICE VISIT (OUTPATIENT)
Dept: PULMONOLOGY | Facility: CLINIC | Age: 67
End: 2022-12-15

## 2022-12-15 VITALS
HEART RATE: 68 BPM | DIASTOLIC BLOOD PRESSURE: 83 MMHG | BODY MASS INDEX: 27.92 KG/M2 | OXYGEN SATURATION: 93 % | HEIGHT: 70 IN | WEIGHT: 195 LBS | RESPIRATION RATE: 18 BRPM | TEMPERATURE: 97.8 F | SYSTOLIC BLOOD PRESSURE: 114 MMHG

## 2022-12-15 DIAGNOSIS — J44.9 CHRONIC OBSTRUCTIVE PULMONARY DISEASE, UNSPECIFIED COPD TYPE: ICD-10-CM

## 2022-12-15 PROBLEM — G47.33 OSA (OBSTRUCTIVE SLEEP APNEA): Status: ACTIVE | Noted: 2022-12-15

## 2022-12-15 PROCEDURE — 99213 OFFICE O/P EST LOW 20 MIN: CPT | Performed by: INTERNAL MEDICINE

## 2022-12-15 RX ORDER — ALBUTEROL SULFATE 90 UG/1
2 AEROSOL, METERED RESPIRATORY (INHALATION) EVERY 4 HOURS PRN
Qty: 54 G | Refills: 3 | Status: SHIPPED | OUTPATIENT
Start: 2022-12-15 | End: 2022-12-15 | Stop reason: SDUPTHER

## 2022-12-15 RX ORDER — TIOTROPIUM BROMIDE AND OLODATEROL 3.124; 2.736 UG/1; UG/1
2 SPRAY, METERED RESPIRATORY (INHALATION)
Qty: 3 EACH | Refills: 4 | Status: SHIPPED | OUTPATIENT
Start: 2022-12-15 | End: 2022-12-15 | Stop reason: SDUPTHER

## 2022-12-15 RX ORDER — ALBUTEROL SULFATE 90 UG/1
2 AEROSOL, METERED RESPIRATORY (INHALATION) EVERY 4 HOURS PRN
Qty: 54 G | Refills: 3 | Status: SHIPPED | OUTPATIENT
Start: 2022-12-15

## 2022-12-15 RX ORDER — TIOTROPIUM BROMIDE AND OLODATEROL 3.124; 2.736 UG/1; UG/1
2 SPRAY, METERED RESPIRATORY (INHALATION)
Qty: 3 EACH | Refills: 4 | Status: SHIPPED | OUTPATIENT
Start: 2022-12-15

## 2022-12-15 NOTE — ASSESSMENT & PLAN NOTE
Symptoms are well controlled at this time    Continue Stiolto    Continue as needed albuterol    Patient is up-to-date on all pulmonary specific vaccines including influenza flu and pneumonia as well as COVID

## 2022-12-15 NOTE — PROGRESS NOTES
"Chief Complaint  COPD, Follow-up (6 Month ), Shortness of Breath, Cough, and Wheezing    Subjective        Sacha Arzola presents to [facility] MEDICINE  History of Present Illness  Follow-up for COPD  Doing well  Cutting back on smoking but still at a pack a day  Not interested in complete smoking cessation  No increased cough  No exacerbations of COPD since last office visit  Had CT scan in September showing no suspicious pulmonary nodules  Objective   Vital Signs:  /83 (BP Location: Left arm, Patient Position: Sitting, Cuff Size: Adult)   Pulse 68   Temp 97.8 °F (36.6 °C) (Temporal)   Resp 18   Ht 177.8 cm (70\")   Wt 88.5 kg (195 lb)   SpO2 93% Comment: room air  BMI 27.98 kg/m²   Estimated body mass index is 27.98 kg/m² as calculated from the following:    Height as of this encounter: 177.8 cm (70\").    Weight as of this encounter: 88.5 kg (195 lb).          Physical Exam   Vital Signs Reviewed  General WDWN, Alert, NAD.    Chest:  good aeration, clear to auscultation bilaterally, tympanic to percussion bilaterally, no work of breathing noted  CV: RRR, no MGR, .  EXT:  no clubbing, no cyanosis, no edema, no joint tenderness  Neuro:  A&Ox3, CN grossly intact, no focal deficits.  Skin: No rashes or lesions noted  Result Review :      Data reviewed: Radiologic studies CT scan from September 2022 showing stable subcentimeter pulmonary nodules          Assessment and Plan   Diagnoses and all orders for this visit:    1. Chronic obstructive pulmonary disease, unspecified COPD type (HCC)  Assessment & Plan:  Symptoms are well controlled at this time    Continue Stiolto    Continue as needed albuterol    Patient is up-to-date on all pulmonary specific vaccines including influenza flu and pneumonia as well as COVID      Orders:  -     Discontinue: tiotropium bromide-olodaterol (Stiolto Respimat) 2.5-2.5 MCG/ACT aerosol solution inhaler; Inhale 2 puffs " Daily.  Dispense: 3 each; Refill: 4  -     Discontinue: albuterol sulfate  (90 Base) MCG/ACT inhaler; Inhale 2 puffs Every 4 (Four) Hours As Needed for Wheezing.  Dispense: 54 g; Refill: 3  -     tiotropium bromide-olodaterol (Stiolto Respimat) 2.5-2.5 MCG/ACT aerosol solution inhaler; Inhale 2 puffs Daily.  Dispense: 3 each; Refill: 4  -     albuterol sulfate  (90 Base) MCG/ACT inhaler; Inhale 2 puffs Every 4 (Four) Hours As Needed for Wheezing.  Dispense: 54 g; Refill: 3           Follow Up   Return in about 6 months (around 6/15/2023).  Patient was given instructions and counseling regarding his condition or for health maintenance advice. Please see specific information pulled into the AVS if appropriate.

## 2022-12-16 RX ORDER — APIXABAN 5 MG/1
TABLET, FILM COATED ORAL
Qty: 180 TABLET | Refills: 3 | Status: SHIPPED | OUTPATIENT
Start: 2022-12-16

## 2022-12-20 RX ORDER — TIOTROPIUM BROMIDE AND OLODATEROL 3.124; 2.736 UG/1; UG/1
2 SPRAY, METERED RESPIRATORY (INHALATION)
Qty: 2 EACH | Refills: 0 | COMMUNITY
Start: 2022-12-20 | End: 2022-12-21

## 2023-02-02 ENCOUNTER — OFFICE VISIT (OUTPATIENT)
Dept: CARDIOLOGY | Facility: CLINIC | Age: 68
End: 2023-02-02
Payer: MEDICARE

## 2023-02-02 VITALS
SYSTOLIC BLOOD PRESSURE: 110 MMHG | WEIGHT: 204 LBS | HEART RATE: 77 BPM | BODY MASS INDEX: 29.2 KG/M2 | HEIGHT: 70 IN | DIASTOLIC BLOOD PRESSURE: 77 MMHG

## 2023-02-02 DIAGNOSIS — I48.20 ATRIAL FIBRILLATION, CHRONIC: ICD-10-CM

## 2023-02-02 DIAGNOSIS — E78.5 HYPERLIPIDEMIA LDL GOAL <70: ICD-10-CM

## 2023-02-02 DIAGNOSIS — I10 ESSENTIAL HYPERTENSION: ICD-10-CM

## 2023-02-02 DIAGNOSIS — I50.32 CHRONIC HEART FAILURE WITH PRESERVED EJECTION FRACTION (HFPEF): Primary | ICD-10-CM

## 2023-02-02 PROCEDURE — 93000 ELECTROCARDIOGRAM COMPLETE: CPT | Performed by: INTERNAL MEDICINE

## 2023-02-02 PROCEDURE — 99214 OFFICE O/P EST MOD 30 MIN: CPT | Performed by: INTERNAL MEDICINE

## 2023-02-02 RX ORDER — TRIAMTERENE AND HYDROCHLOROTHIAZIDE 37.5; 25 MG/1; MG/1
1 TABLET ORAL DAILY
Qty: 90 TABLET | Refills: 3 | Status: SHIPPED | OUTPATIENT
Start: 2023-02-02

## 2023-02-02 RX ORDER — POTASSIUM CHLORIDE 750 MG/1
20 TABLET, FILM COATED, EXTENDED RELEASE ORAL DAILY
Qty: 180 TABLET | Refills: 3 | Status: SHIPPED | OUTPATIENT
Start: 2023-02-02

## 2023-02-02 RX ORDER — METOPROLOL SUCCINATE 200 MG/1
200 TABLET, EXTENDED RELEASE ORAL DAILY
Qty: 90 TABLET | Refills: 3 | Status: SHIPPED | OUTPATIENT
Start: 2023-02-02

## 2023-02-02 RX ORDER — SIMVASTATIN 10 MG
10 TABLET ORAL
Qty: 90 TABLET | Refills: 3 | Status: SHIPPED | OUTPATIENT
Start: 2023-02-02

## 2023-02-02 RX ORDER — LISINOPRIL 5 MG/1
5 TABLET ORAL DAILY
Qty: 90 TABLET | Refills: 3 | Status: SHIPPED | OUTPATIENT
Start: 2023-02-02

## 2023-02-02 NOTE — ASSESSMENT & PLAN NOTE
Patient rate controlled on metoprolol 200 mg once a day.  Continue on his Eliquis 5 twice daily dosing for CVA prevention

## 2023-02-02 NOTE — ASSESSMENT & PLAN NOTE
Patient clinically stable weight is up mildly but no worsening dyspnea on exertion symptoms.  Counseled patient on keeping a daily weight log if possible and to call back if more than 5 pound weight gain past his current range.  Continue with his triamterene hydrochlorothiazide 37.5/25 daily dosing we will repeat a BMP level

## 2023-02-02 NOTE — PROGRESS NOTES
Chief Complaint  Atrial Fibrillation, Congestive Heart Failure, Follow-up, Hypertension, and Hyperlipidemia     Subjective    Patient has been symptomatically stable still with dyspnea on exertion issues no significant change any lower extremity edema.  His weight is up about 6 or 7 pounds since last visit.  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   • Seizures ([facility])          Current Outpatient Medications:   •  albuterol sulfate  (90 Base) MCG/ACT inhaler, Inhale 2 puffs Every 4 (Four) Hours As Needed for Wheezing., Disp: 54 g, Rfl: 3  •  Aspirin Adult Low Strength 81 MG EC tablet, TAKE ONE TABLET BY MOUTH ONCE DAILY, Disp: 90 tablet, Rfl: 3  •  Eliquis 5 MG tablet tablet, TAKE 1 TABLET TWICE A DAY, Disp: 180 tablet, Rfl: 3  •  levETIRAcetam (KEPPRA) 500 MG tablet, Take 2 tablets by mouth 2 (Two) Times a Day., Disp: 360 tablet, Rfl: 1  •  lisinopril (PRINIVIL,ZESTRIL) 5 MG tablet, Take 1 tablet by mouth Daily., Disp: 90 tablet, Rfl: 3  •  metoprolol succinate XL (TOPROL-XL) 200 MG 24 hr tablet, Take 1 tablet by mouth Daily., Disp: 90 tablet, Rfl: 3  •  Multiple Vitamins-Minerals (PRESERVISION AREDS 2 PO), Take  by mouth., Disp: , Rfl:   •  omega-3 acid ethyl esters (LOVAZA) 1 g capsule, TAKE ONE CAPSULE BY MOUTH TWICE DAILY, Disp: 180 capsule, Rfl: 3  •  potassium chloride 10 MEQ CR tablet, Take 2 tablets by mouth Daily., Disp: 180 tablet, Rfl: 3  •  simvastatin (ZOCOR) 10 MG tablet, Take 1 tablet by mouth Daily With  "Breakfast., Disp: 90 tablet, Rfl: 3  •  tiotropium bromide-olodaterol (Stiolto Respimat) 2.5-2.5 MCG/ACT aerosol solution inhaler, Inhale 2 puffs Daily., Disp: 3 each, Rfl: 4  •  triamterene-hydrochlorothiazide (MAXZIDE-25) 37.5-25 MG per tablet, Take 1 tablet by mouth Daily., Disp: 90 tablet, Rfl: 3    Medications Discontinued During This Encounter   Medication Reason   • simvastatin (ZOCOR) 10 MG tablet Reorder   • lisinopril (PRINIVIL,ZESTRIL) 5 MG tablet Reorder   • triamterene-hydrochlorothiazide (MAXZIDE-25) 37.5-25 MG per tablet Reorder   • metoprolol succinate XL (TOPROL-XL) 200 MG 24 hr tablet Reorder   • potassium chloride 10 MEQ CR tablet Reorder     No Known Allergies     Social History     Tobacco Use   • Smoking status: Every Day     Packs/day: 1.00     Years: 47.00     Pack years: 47.00     Types: Cigarettes     Start date: 1974   • Smokeless tobacco: Never   Vaping Use   • Vaping Use: Never used   Substance Use Topics   • Alcohol use: Yes     Comment: occ   • Drug use: Never       Family History   Problem Relation Age of Onset   • Mental illness Mother    • Heart disease Mother    • Other Father         parkinson        Objective     /77   Pulse 77   Ht 177.8 cm (70\")   Wt 92.5 kg (204 lb)   BMI 29.27 kg/m²       Physical Exam    General Appearance:   · no acute distress  · Alert and oriented x3  HENT:   · lips not cyanotic  · Atraumatic  Neck:  · No jvd   · supple  Respiratory:  · no respiratory distress  · Faint expiratory wheeze  · no rales  Cardiovascular:  · Irregular irregular  · no S3, no S4   · no murmur  · no rub  Extremities  · No cyanosis  · lower extremity edema: Trace to +1  Skin:   · warm, dry  · No rashes      Result Review :     No results found for: PROBNP  CMP    CMP 4/14/22 9/29/22   Glucose 111 (A) 107 (A)   BUN 9 9   Creatinine 0.98 0.81   eGFR 85.0 97.2   Sodium 136 137   Potassium 3.9 4.0   Chloride 99 96 (A)   Calcium 9.1 9.2   Total Protein 7.5 7.1   Albumin 4.20 " 3.80   Globulin 3.3 3.3   Total Bilirubin 0.7 0.5   Alkaline Phosphatase 92 125 (A)   AST (SGOT) 26 26   ALT (SGPT) 26 23   Albumin/Globulin Ratio 1.3 1.2   BUN/Creatinine Ratio 9.2 11.1   Anion Gap 12.0 10.4   (A) Abnormal value       Comments are available for some flowsheets but are not being displayed.           CBC w/diff    CBC w/Diff 4/14/22 9/29/22   WBC 9.07 9.28   RBC 5.89 (A) 5.59   Hemoglobin 19.0 (A) 17.6   Hematocrit 55.3 (A) 52.0 (A)   MCV 93.9 93.0   MCH 32.3 31.5   MCHC 34.4 33.8   RDW 12.5 12.0 (A)   Platelets 210 184   Neutrophil Rel % 71.7 69.3   Immature Granulocyte Rel % 0.2 0.4   Lymphocyte Rel % 20.8 24.0   Monocyte Rel % 4.7 (A) 4.5 (A)   Eosinophil Rel % 1.7 1.3   Basophil Rel % 0.9 0.5   (A) Abnormal value             Lab Results   Component Value Date    TSH 1.830 09/29/2022      No results found for: FREET4   No results found for: DDIMERQUANT  No results found for: MG   No results found for: DIGOXIN   No results found for: TROPONINT        Lipid Panel    Lipid Panel 4/14/22 9/29/22   Total Cholesterol 116 102   Triglycerides 71 60   HDL Cholesterol 43 47   VLDL Cholesterol 15 14   LDL Cholesterol  58 41   LDL/HDL Ratio 1.37 0.91           No results found for: POCTROP    Results for orders placed in visit on 03/21/22    Adult Transthoracic Echo Complete W/ Cont if Necessary Per Protocol    Interpretation Summary  · Calculated left ventricular EF = 63% Estimated left ventricular EF was in agreement with the calculated left ventricular EF.  · The right ventricular cavity is moderately dilated.  · The right atrial cavity is moderately dilated.  · D-shaped compression of the interventricular septum consistent with RV pressure volume overload       ECG 12 Lead    Date/Time: 2/2/2023 1:13 PM  Performed by: Kalin Ch MD  Authorized by: Kalin Ch MD   Comparison: compared with previous ECG   Rhythm: atrial fibrillation  Ectopy: unifocal PVCs  Conduction: left posterior fascicular  block                   Diagnoses and all orders for this visit:    1. Chronic heart failure with preserved ejection fraction (Primary)  Assessment & Plan:  Patient clinically stable weight is up mildly but no worsening dyspnea on exertion symptoms.  Counseled patient on keeping a daily weight log if possible and to call back if more than 5 pound weight gain past his current range.  Continue with his triamterene hydrochlorothiazide 37.5/25 daily dosing we will repeat a BMP level    Orders:  -     potassium chloride 10 MEQ CR tablet; Take 2 tablets by mouth Daily.  Dispense: 180 tablet; Refill: 3  -     Basic Metabolic Panel; Future    2. Atrial fibrillation, chronic  Assessment & Plan:  Patient rate controlled on metoprolol 200 mg once a day.  Continue on his Eliquis 5 twice daily dosing for CVA prevention      3. Essential hypertension  Assessment & Plan:  Controlled on current dose of antihypertensives    Orders:  -     simvastatin (ZOCOR) 10 MG tablet; Take 1 tablet by mouth Daily With Breakfast.  Dispense: 90 tablet; Refill: 3  -     lisinopril (PRINIVIL,ZESTRIL) 5 MG tablet; Take 1 tablet by mouth Daily.  Dispense: 90 tablet; Refill: 3  -     triamterene-hydrochlorothiazide (MAXZIDE-25) 37.5-25 MG per tablet; Take 1 tablet by mouth Daily.  Dispense: 90 tablet; Refill: 3  -     metoprolol succinate XL (TOPROL-XL) 200 MG 24 hr tablet; Take 1 tablet by mouth Daily.  Dispense: 90 tablet; Refill: 3    4. Hyperlipidemia LDL goal <70  -     Lipid Panel; Future  -     Hepatic Function Panel; Future    Other orders  -     ECG 12 Lead          Follow Up     Return in about 6 months (around 8/2/2023).          Patient was given instructions and counseling regarding his condition or for health maintenance advice. Please see specific information pulled into the AVS if appropriate.

## 2023-03-16 RX ORDER — OMEGA-3-ACID ETHYL ESTERS 1 G/1
CAPSULE, LIQUID FILLED ORAL
Qty: 180 CAPSULE | Refills: 3 | Status: SHIPPED | OUTPATIENT
Start: 2023-03-16

## 2023-03-16 RX ORDER — ASPIRIN 81 MG/1
TABLET ORAL
Qty: 90 TABLET | Refills: 3 | Status: SHIPPED | OUTPATIENT
Start: 2023-03-16

## 2023-03-29 ENCOUNTER — OFFICE VISIT (OUTPATIENT)
Dept: FAMILY MEDICINE CLINIC | Facility: CLINIC | Age: 68
End: 2023-03-29
Payer: MEDICARE

## 2023-03-29 DIAGNOSIS — F17.219 CIGARETTE NICOTINE DEPENDENCE WITH NICOTINE-INDUCED DISORDER: ICD-10-CM

## 2023-03-29 DIAGNOSIS — Z13.6 SCREENING FOR AAA (ABDOMINAL AORTIC ANEURYSM): Primary | ICD-10-CM

## 2023-03-29 DIAGNOSIS — R56.9 SEIZURES: ICD-10-CM

## 2023-03-29 RX ORDER — LEVETIRACETAM 500 MG/1
1000 TABLET ORAL 2 TIMES DAILY
Qty: 360 TABLET | Refills: 1 | Status: SHIPPED | OUTPATIENT
Start: 2023-03-29

## 2023-03-29 NOTE — PROGRESS NOTES
Sacha Arzola chose to receive care through a telephone visit today. He consented to use a telephone visit for his medical care today.          SUBJECTIVE  Sacha Arzola is a 68 y.o. male being seen in via telephone today for sz refills of med                History of the Present Illness   HPI   He has history of seizures.  He does not drive anymore and he does not have a capability for D[employer]im[employer] on the phone.  He preferred a telephone call which we did today.  He keeps his appointments with the specialist which she did just see cardiology and February.  He feels good overall.  He is not having any chest pain shortness of breath.  He has not had any seizures that he is aware of.  He wants to do labs with next appointment.  He did do blood work in February from cardiology.    Patient's medication list was reviewed with him.       ASSESSMENT AND PLAN     Problem List Items Addressed This Visit        Neuro    Seizures (HCC)    Relevant Medications    levETIRAcetam (KEPPRA) 500 MG tablet       Tobacco    Cigarette nicotine dependence with nicotine-induced disorder    Relevant Orders    US AAA Screen Limited   Other Visit Diagnoses     Screening for AAA (abdominal aortic aneurysm)    -  Primary    Relevant Orders    US AAA Screen Limited          Return in about 6 months (around 9/29/2023).      We will do his Medicare wellness visit with his 6-month appointment in September.  He is doing well with his current medications.  He will do the AAA screening though he wants his daughter Amanda called to schedule that appointment as she drives him.  He is aware he needs to quit smoking.  Follow-up in 6 months for labs and appt. Call with any concerns or questions that you may have regarding your medications or history.    .Parts of this note are electronic transcriptions/translations of spoken language to printed text using the Dragon Dictation system.    This visit was scheduled as a phone visit to comply with patient  safety concerns in accordance with CDC recommendations.  Time spent caring for the patient was 11 - 20 min.  Lee Ann Wilhelm, APRN  03/29/2023

## 2023-04-26 ENCOUNTER — HOSPITAL ENCOUNTER (OUTPATIENT)
Dept: ULTRASOUND IMAGING | Facility: HOSPITAL | Age: 68
Discharge: HOME | End: 2023-04-26
Admitting: NURSE PRACTITIONER
Payer: MEDICARE

## 2023-04-26 DIAGNOSIS — Z13.6 SCREENING FOR AAA (ABDOMINAL AORTIC ANEURYSM): ICD-10-CM

## 2023-04-26 DIAGNOSIS — F17.219 CIGARETTE NICOTINE DEPENDENCE WITH NICOTINE-INDUCED DISORDER: ICD-10-CM

## 2023-04-26 PROCEDURE — 76706 US ABDL AORTA SCREEN AAA: CPT

## 2023-08-30 ENCOUNTER — LAB (OUTPATIENT)
Dept: LAB | Facility: HOSPITAL | Age: 68
End: 2023-08-30
Payer: MEDICARE

## 2023-08-30 ENCOUNTER — OFFICE VISIT (OUTPATIENT)
Dept: CARDIOLOGY | Facility: CLINIC | Age: 68
End: 2023-08-30
Payer: MEDICARE

## 2023-08-30 VITALS
HEART RATE: 104 BPM | BODY MASS INDEX: 27.35 KG/M2 | WEIGHT: 191 LBS | HEIGHT: 70 IN | SYSTOLIC BLOOD PRESSURE: 104 MMHG | DIASTOLIC BLOOD PRESSURE: 76 MMHG

## 2023-08-30 DIAGNOSIS — I48.20 ATRIAL FIBRILLATION, CHRONIC: ICD-10-CM

## 2023-08-30 DIAGNOSIS — I50.32 CHRONIC HEART FAILURE WITH PRESERVED EJECTION FRACTION (HFPEF): ICD-10-CM

## 2023-08-30 DIAGNOSIS — I10 ESSENTIAL HYPERTENSION: Primary | ICD-10-CM

## 2023-08-30 DIAGNOSIS — E78.5 HYPERLIPIDEMIA LDL GOAL <70: ICD-10-CM

## 2023-08-30 DIAGNOSIS — I10 ESSENTIAL HYPERTENSION: ICD-10-CM

## 2023-08-30 LAB
ALBUMIN SERPL-MCNC: 3.7 G/DL (ref 3.5–5.2)
ALP SERPL-CCNC: 110 U/L (ref 39–117)
ALT SERPL W P-5'-P-CCNC: 17 U/L (ref 1–41)
ANION GAP SERPL CALCULATED.3IONS-SCNC: 11 MMOL/L (ref 5–15)
AST SERPL-CCNC: 18 U/L (ref 1–40)
BILIRUB CONJ SERPL-MCNC: 0.2 MG/DL (ref 0–0.3)
BILIRUB INDIRECT SERPL-MCNC: 0.4 MG/DL
BILIRUB SERPL-MCNC: 0.6 MG/DL (ref 0–1.2)
BUN SERPL-MCNC: 17 MG/DL (ref 8–23)
BUN/CREAT SERPL: 19.5 (ref 7–25)
CALCIUM SPEC-SCNC: 9.3 MG/DL (ref 8.6–10.5)
CHLORIDE SERPL-SCNC: 100 MMOL/L (ref 98–107)
CHOLEST SERPL-MCNC: 127 MG/DL (ref 0–200)
CO2 SERPL-SCNC: 28 MMOL/L (ref 22–29)
CREAT SERPL-MCNC: 0.87 MG/DL (ref 0.76–1.27)
EGFRCR SERPLBLD CKD-EPI 2021: 94.6 ML/MIN/1.73
GLUCOSE SERPL-MCNC: 102 MG/DL (ref 65–99)
HDLC SERPL-MCNC: 41 MG/DL (ref 40–60)
LDLC SERPL CALC-MCNC: 71 MG/DL (ref 0–100)
LDLC/HDLC SERPL: 1.74 {RATIO}
POTASSIUM SERPL-SCNC: 3.8 MMOL/L (ref 3.5–5.2)
PROT SERPL-MCNC: 7 G/DL (ref 6–8.5)
SODIUM SERPL-SCNC: 139 MMOL/L (ref 136–145)
TRIGL SERPL-MCNC: 74 MG/DL (ref 0–150)
VLDLC SERPL-MCNC: 15 MG/DL (ref 5–40)

## 2023-08-30 PROCEDURE — 80048 BASIC METABOLIC PNL TOTAL CA: CPT

## 2023-08-30 PROCEDURE — 80076 HEPATIC FUNCTION PANEL: CPT

## 2023-08-30 PROCEDURE — 36415 COLL VENOUS BLD VENIPUNCTURE: CPT

## 2023-08-30 PROCEDURE — 80061 LIPID PANEL: CPT

## 2023-08-30 NOTE — PROGRESS NOTES
Chief Complaint  Follow-up (6 month f/u)    Subjective    Patient reports no change in symptoms since last visit still stable dyspnea on exertion and shortness of breath issues.  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    Seizures          Current Outpatient Medications:     albuterol sulfate  (90 Base) MCG/ACT inhaler, Inhale 2 puffs Every 4 (Four) Hours As Needed for Wheezing., Disp: 54 g, Rfl: 3    Aspirin Adult Low Strength 81 MG EC tablet, TAKE ONE TABLET BY MOUTH ONCE DAILY, Disp: 90 tablet, Rfl: 3    Eliquis 5 MG tablet tablet, TAKE 1 TABLET TWICE A DAY, Disp: 180 tablet, Rfl: 3    levETIRAcetam (KEPPRA) 500 MG tablet, Take 2 tablets by mouth 2 (Two) Times a Day., Disp: 360 tablet, Rfl: 1    lisinopril (PRINIVIL,ZESTRIL) 5 MG tablet, Take 1 tablet by mouth Daily., Disp: 90 tablet, Rfl: 3    metoprolol succinate XL (TOPROL-XL) 200 MG 24 hr tablet, Take 1 tablet by mouth Daily., Disp: 90 tablet, Rfl: 3    omega-3 acid ethyl esters (LOVAZA) 1 g capsule, TAKE ONE CAPSULE BY MOUTH TWICE DAILY, Disp: 180 capsule, Rfl: 3    potassium chloride 10 MEQ CR tablet, Take 2 tablets by mouth Daily., Disp: 180 tablet, Rfl: 3    simvastatin (ZOCOR) 10 MG tablet, Take 1 tablet by mouth Daily With Breakfast., Disp: 90 tablet, Rfl: 3    tiotropium bromide-olodaterol (Stiolto Respimat) 2.5-2.5 MCG/ACT aerosol solution inhaler, Inhale 2 puffs Daily., Disp: 3 each, Rfl: 4    triamterene-hydrochlorothiazide (MAXZIDE-25) 37.5-25 MG per tablet, Take 1 tablet  "by mouth Daily., Disp: 90 tablet, Rfl: 3    Medications Discontinued During This Encounter   Medication Reason    Multiple Vitamins-Minerals (PRESERVISION AREDS 2 PO) *Therapy completed     No Known Allergies     Social History     Tobacco Use    Smoking status: Every Day     Packs/day: 1.00     Years: 47.00     Pack years: 47.00     Types: Cigarettes     Start date: 1974     Passive exposure: Current    Smokeless tobacco: Never    Tobacco comments:     1-1.5 PPD as of 7/5/2023 - [place]    Vaping Use    Vaping Use: Never used   Substance Use Topics    Alcohol use: Yes     Comment: occ    Drug use: Never       Family History   Problem Relation Age of Onset    Mental illness Mother     Heart disease Mother     Other Father         parkinson        Objective     /76 (BP Location: Left arm, Patient Position: Sitting)   Pulse 104   Ht 177.8 cm (70\")   Wt 86.6 kg (191 lb) Comment: Previous weight - 188lbs  BMI 27.41 kg/mý       Physical Exam    General Appearance:   no acute distress  Alert and oriented x3  HENT:   lips not cyanotic  Atraumatic  Neck:  No jvd   supple  Respiratory:  no respiratory distress  Faint expiratory wheezes  no rales  Cardiovascular:  Irregular irregular  no S3, no S4   no murmur  no rub  Extremities  No cyanosis  lower extremity edema: none    Skin:   warm, dry  No rashes      Result Review :     No results found for: PROBNP  CMP          9/29/2022    07:35   CMP   Glucose 107    BUN 9    Creatinine 0.81    EGFR 97.2    Sodium 137    Potassium 4.0    Chloride 96    Calcium 9.2    Total Protein 7.1    Albumin 3.80    Globulin 3.3    Total Bilirubin 0.5    Alkaline Phosphatase 125    AST (SGOT) 26    ALT (SGPT) 23    Albumin/Globulin Ratio 1.2    BUN/Creatinine Ratio 11.1    Anion Gap 10.4      CBC w/diff          9/29/2022    07:35   CBC w/Diff   WBC 9.28    RBC 5.59    Hemoglobin 17.6    Hematocrit 52.0    MCV 93.0    MCH 31.5    MCHC 33.8    RDW 12.0    Platelets 184    Neutrophil Rel % " 69.3    Immature Granulocyte Rel % 0.4    Lymphocyte Rel % 24.0    Monocyte Rel % 4.5    Eosinophil Rel % 1.3    Basophil Rel % 0.5       Lab Results   Component Value Date    TSH 1.830 09/29/2022      No results found for: FREET4   No results found for: DDIMERQUANT  No results found for: MG   No results found for: DIGOXIN   No results found for: TROPONINT        Lipid Panel          9/29/2022    07:35   Lipid Panel   Total Cholesterol 102    Triglycerides 60    HDL Cholesterol 47    VLDL Cholesterol 14    LDL Cholesterol  41    LDL/HDL Ratio 0.91      No results found for: POCTROP    Results for orders placed in visit on 03/21/22    Adult Transthoracic Echo Complete W/ Cont if Necessary Per Protocol    Interpretation Summary  ú Calculated left ventricular EF = 63% Estimated left ventricular EF was in agreement with the calculated left ventricular EF.  ú The right ventricular cavity is moderately dilated.  ú The right atrial cavity is moderately dilated.  ú D-shaped compression of the interventricular septum consistent with RV pressure volume overload                 Diagnoses and all orders for this visit:    1. Essential hypertension (Primary)  Assessment & Plan:  Blood pressure well controlled    Orders:  -     Hepatic Function Panel; Future  -     Lipid Panel; Future  -     Basic Metabolic Panel; Future    2. Atrial fibrillation, chronic  Assessment & Plan:  Patient symptomatically stable is on Eliquis for CVA prevention did recommend that he follow his heart rate trend at home and if noticing persistent elevations resting heart rate above 100 to call back and would add calcium channel blocker for rate control for the time being we will continue with his Toprol 200 daily      3. Chronic heart failure with preserved ejection fraction  Assessment & Plan:  Patient stable symptom wise with well-controlled blood pressure continue with his current diuretic of triamterene HCTZ 37.5/25 daily we will repeat labs      4.  Hyperlipidemia LDL goal <70  Assessment & Plan:  Continue with simvastatin 10 nightly repeat lipids LFTs to see if at goal              Follow Up     Return in about 6 months (around 2/29/2024) for EKG with F/U.          Patient was given instructions and counseling regarding his condition or for health maintenance advice. Please see specific information pulled into the AVS if appropriate.

## 2023-08-30 NOTE — ASSESSMENT & PLAN NOTE
Patient stable symptom wise with well-controlled blood pressure continue with his current diuretic of triamterene HCTZ 37.5/25 daily we will repeat labs

## 2023-08-30 NOTE — ASSESSMENT & PLAN NOTE
Patient symptomatically stable is on Eliquis for CVA prevention did recommend that he follow his heart rate trend at home and if noticing persistent elevations resting heart rate above 100 to call back and would add calcium channel blocker for rate control for the time being we will continue with his Toprol 200 daily

## 2023-08-31 ENCOUNTER — TELEPHONE (OUTPATIENT)
Dept: CARDIOLOGY | Facility: CLINIC | Age: 68
End: 2023-08-31
Payer: MEDICARE

## 2023-08-31 NOTE — TELEPHONE ENCOUNTER
----- Message from EVIN Perez sent at 8/31/2023  9:08 AM EDT -----  Renal function and electrolytes are good, liver enzymes are good, lipid panel is good, continue current meds and follow up as scheduled

## 2023-09-19 ENCOUNTER — OFFICE VISIT (OUTPATIENT)
Dept: SLEEP MEDICINE | Facility: HOSPITAL | Age: 68
End: 2023-09-19
Payer: MEDICARE

## 2023-09-19 VITALS — HEIGHT: 70 IN | HEART RATE: 81 BPM | BODY MASS INDEX: 27.37 KG/M2 | WEIGHT: 191.2 LBS | OXYGEN SATURATION: 96 %

## 2023-09-19 DIAGNOSIS — E66.3 OVERWEIGHT WITH BODY MASS INDEX (BMI) 25.0-29.9: ICD-10-CM

## 2023-09-19 DIAGNOSIS — J44.9 CHRONIC OBSTRUCTIVE PULMONARY DISEASE, UNSPECIFIED COPD TYPE: ICD-10-CM

## 2023-09-19 DIAGNOSIS — G47.33 OBSTRUCTIVE SLEEP APNEA: Primary | ICD-10-CM

## 2023-09-19 PROCEDURE — G0463 HOSPITAL OUTPT CLINIC VISIT: HCPCS

## 2023-09-19 NOTE — PROGRESS NOTES
"Follow Up [facility] Note     Chief Complaint:  KATHARINE     Primary Care Physician: Lee Ann Wilhelm APRN    Interval History:   The patient is a 68 y.o. male  who was last seen in the sleep lab: 9/15/2022.  Presents today for annual follow-up of KATHARINE.  Underlying COPD.  2016 sleep study showed  events per hour with hypoxemia.  Patient is on auto CPAP 12-20 cm H2O.  Today reports no problems with mask machine hypersomnia nonrestorative sleep.  Bedtime wake time varies.  Fullface mask fits well occasionally leaks air.    Downloaded PAP Data Reviewed For Compliance:  DME is [facility].  Downloads between 8/13/2023 - 9/11/2023.  Average usage is 6 hours 51 minutes.  Average AHI is 4.3.  Average auto CPAP pressure is 16.9 cm H2O    I have reviewed the above results and compared them with the patient's last downloads and reviewed with the patient.    Review of Systems:    A complete review of systems was done and all were negative with the exception of shortness of breath wheezing dry mouth nasal congestion    Social History:    Social History     Socioeconomic History    Marital status:    Tobacco Use    Smoking status: Every Day     Packs/day: 1.00     Years: 47.00     Pack years: 47.00     Types: Cigarettes     Start date: 1974     Passive exposure: Current    Smokeless tobacco: Never    Tobacco comments:     1-1.5 PPD as of 7/5/2023 - [place]    Vaping Use    Vaping Use: Never used   Substance and Sexual Activity    Alcohol use: Yes     Comment: occ    Drug use: Never    Sexual activity: Defer       Allergies:  Patient has no known allergies.     Medication Review:  Reviewed.      Vital Signs:    Vitals:    09/19/23 0700   Pulse: 81   SpO2: 96%   Weight: 86.7 kg (191 lb 3.2 oz)   Height: 177.8 cm (70\")     Body mass index is 27.43 kg/m².    Physical Exam:    Constitutional:  Well developed 68 y.o. male that appears in no apparent distress.  Awake & oriented times 3.  Normal mood with normal " recent and remote memory and normal judgement.  Eyes:  Conjunctivae normal.  Oropharynx: Previously, moist mucous membranes.    Self-administered Valier Sleepiness Scale test results: 2  0-5 Lower normal daytime sleepiness  6-10 Higher normal daytime sleepiness  11-12 Mild, 13-15 Moderate, & 16-24 Severe excessive daytime sleepiness    Impression:   1. Obstructive sleep apnea    2. Chronic obstructive pulmonary disease, unspecified COPD type    3. Overweight with body mass index (BMI) 25.0-29.9        Obstructive sleep apnea adequately treated with auto CPAP 12-20. The patient appears to be at goal with good compliance and usage. The patient has no complaints of hypersomnolence.    Plan:  Good sleep hygiene measures should be maintained.  Weight loss would be beneficial in this patient who overweight by Body mass index is 27.43 kg/m²..      After evaluating the patient and assessing results available, the patient is benefiting from the treatment being provided.     The patient will continue auto CPAP.  After clinical evaluation and review of downloads, I recommend no changes to the patient's pressures.  A new prescription will be sent to the patient's DME.    Caution during activities that require prolonged concentration is strongly advised if sleepiness returns. Changing of PAP supplies regularly is important for effective use. Patient needs to change cushion on the mask or plugs on nasal pillows along with disposable filters once every month and change mask frame, tubing, headgear and Velcro straps every 6 months at the minimum.    I answered all of the patient's questions.  The patient will call for any problems and will follow up in 1 year.      Binh Morrow MD  Sleep Medicine  09/19/23  08:20 EDT

## 2023-09-28 ENCOUNTER — OFFICE VISIT (OUTPATIENT)
Dept: FAMILY MEDICINE CLINIC | Facility: CLINIC | Age: 68
End: 2023-09-28
Payer: MEDICARE

## 2023-09-28 VITALS
WEIGHT: 188.8 LBS | OXYGEN SATURATION: 98 % | RESPIRATION RATE: 14 BRPM | HEIGHT: 70 IN | HEART RATE: 79 BPM | DIASTOLIC BLOOD PRESSURE: 77 MMHG | BODY MASS INDEX: 27.03 KG/M2 | TEMPERATURE: 96.9 F | SYSTOLIC BLOOD PRESSURE: 113 MMHG

## 2023-09-28 DIAGNOSIS — I50.32 CHRONIC HEART FAILURE WITH PRESERVED EJECTION FRACTION (HFPEF): ICD-10-CM

## 2023-09-28 DIAGNOSIS — Z86.73 HISTORY OF STROKE: Primary | ICD-10-CM

## 2023-09-28 DIAGNOSIS — Z12.5 SCREENING PSA (PROSTATE SPECIFIC ANTIGEN): ICD-10-CM

## 2023-09-28 DIAGNOSIS — F17.219 CIGARETTE NICOTINE DEPENDENCE WITH NICOTINE-INDUCED DISORDER: ICD-10-CM

## 2023-09-28 DIAGNOSIS — Z87.898 HISTORY OF SEIZURE: ICD-10-CM

## 2023-09-28 DIAGNOSIS — I48.20 ATRIAL FIBRILLATION, CHRONIC: ICD-10-CM

## 2023-09-28 DIAGNOSIS — H35.30 MACULAR DEGENERATION, UNSPECIFIED LATERALITY, UNSPECIFIED TYPE: ICD-10-CM

## 2023-09-28 DIAGNOSIS — Z12.2 ENCOUNTER FOR SCREENING FOR LUNG CANCER: ICD-10-CM

## 2023-09-28 DIAGNOSIS — J44.9 CHRONIC OBSTRUCTIVE PULMONARY DISEASE, UNSPECIFIED COPD TYPE: ICD-10-CM

## 2023-09-28 DIAGNOSIS — G47.33 OSA (OBSTRUCTIVE SLEEP APNEA): ICD-10-CM

## 2023-09-28 DIAGNOSIS — I63.9 CEREBROVASCULAR ACCIDENT (CVA), UNSPECIFIED MECHANISM: ICD-10-CM

## 2023-09-28 DIAGNOSIS — I10 ESSENTIAL HYPERTENSION: ICD-10-CM

## 2023-09-28 DIAGNOSIS — I51.7 ENLARGED HEART: ICD-10-CM

## 2023-09-28 DIAGNOSIS — Z23 NEED FOR INFLUENZA VACCINATION: ICD-10-CM

## 2023-09-28 PROBLEM — R56.9 SEIZURES: Status: RESOLVED | Noted: 2021-10-11 | Resolved: 2023-09-28

## 2023-09-28 LAB
BASOPHILS # BLD AUTO: 0.1 10*3/MM3 (ref 0–0.2)
BASOPHILS NFR BLD AUTO: 1.1 % (ref 0–1.5)
DEPRECATED RDW RBC AUTO: 40.7 FL (ref 37–54)
EOSINOPHIL # BLD AUTO: 0.21 10*3/MM3 (ref 0–0.4)
EOSINOPHIL NFR BLD AUTO: 2.4 % (ref 0.3–6.2)
ERYTHROCYTE [DISTWIDTH] IN BLOOD BY AUTOMATED COUNT: 12 % (ref 12.3–15.4)
HCT VFR BLD AUTO: 52.3 % (ref 37.5–51)
HGB BLD-MCNC: 18 G/DL (ref 13–17.7)
IMM GRANULOCYTES # BLD AUTO: 0.04 10*3/MM3 (ref 0–0.05)
IMM GRANULOCYTES NFR BLD AUTO: 0.5 % (ref 0–0.5)
LYMPHOCYTES # BLD AUTO: 2.68 10*3/MM3 (ref 0.7–3.1)
LYMPHOCYTES NFR BLD AUTO: 30.7 % (ref 19.6–45.3)
MCH RBC QN AUTO: 32 PG (ref 26.6–33)
MCHC RBC AUTO-ENTMCNC: 34.4 G/DL (ref 31.5–35.7)
MCV RBC AUTO: 93.1 FL (ref 79–97)
MONOCYTES # BLD AUTO: 0.54 10*3/MM3 (ref 0.1–0.9)
MONOCYTES NFR BLD AUTO: 6.2 % (ref 5–12)
NEUTROPHILS NFR BLD AUTO: 5.15 10*3/MM3 (ref 1.7–7)
NEUTROPHILS NFR BLD AUTO: 59.1 % (ref 42.7–76)
NRBC BLD AUTO-RTO: 0 /100 WBC (ref 0–0.2)
PLATELET # BLD AUTO: 245 10*3/MM3 (ref 140–450)
PMV BLD AUTO: 11 FL (ref 6–12)
PSA SERPL-MCNC: 1.22 NG/ML (ref 0–4)
RBC # BLD AUTO: 5.62 10*6/MM3 (ref 4.14–5.8)
WBC NRBC COR # BLD: 8.72 10*3/MM3 (ref 3.4–10.8)

## 2023-09-28 PROCEDURE — 85025 COMPLETE CBC W/AUTO DIFF WBC: CPT | Performed by: NURSE PRACTITIONER

## 2023-09-28 PROCEDURE — G0103 PSA SCREENING: HCPCS | Performed by: NURSE PRACTITIONER

## 2023-09-28 PROCEDURE — 80177 DRUG SCRN QUAN LEVETIRACETAM: CPT | Performed by: NURSE PRACTITIONER

## 2023-09-28 RX ORDER — LEVETIRACETAM 500 MG/1
1000 TABLET ORAL 2 TIMES DAILY
Qty: 360 TABLET | Refills: 1 | Status: SHIPPED | OUTPATIENT
Start: 2023-09-28

## 2023-09-28 NOTE — PROGRESS NOTES
Chief Complaint  Hypertension and Hyperlipidemia    Subjective          Sacha Arzola presents to [facility]  History of Present Illness  Here with daughter Amanda.  He said that back in 2009-he believes he was at a [employer] game and had a stroke.  He refused to go to the ER at that time.  His wife had him go to the hospital the next day at [facility].  We will obtain those records.  CVA/Seizures:  He does not drive anymore.  He goes to the eye doctor.  He goes to Dr. Brewer in March.  He has not had any seizures recently. He said that he is never really had a seizure.  He is not sure why he is on the medicine.  HTN/A fib/CHF:  He is followed by cardiology  Cigarettes/COPD:  He is followed by pulm.    KATHARINE:  it has been years.  He did wear one out but then he got a new one.  He goes to the sleep group.    Refuse to stop smoking.  Did cologuard.    Depression: Not at risk    PHQ-2 Score: 0    and 3/29/2023               Allergies  Patient has no known allergies.    Social History     Tobacco Use    Smoking status: Every Day     Packs/day: 1.50     Years: 47.00     Pack years: 70.50     Types: Cigarettes     Start date: 1974     Passive exposure: Current    Smokeless tobacco: Never    Tobacco comments:     1-1.5 PPD as of 7/5/2023 - [place]    Vaping Use    Vaping Use: Never used   Substance Use Topics    Alcohol use: Yes     Alcohol/week: 1.0 standard drink     Types: 1 Cans of beer per week     Comment: daily    Drug use: Never       Family History   Problem Relation Age of Onset    Mental illness Mother     Heart disease Mother     Other Father         parkinson        Health Maintenance Due   Topic Date Due    COLORECTAL CANCER SCREENING  Never done    LUNG CANCER SCREENING  09/23/2023        Immunization History   Administered Date(s) Administered    COVID-19 (ARMANDO) 06/16/2021    Flu Vaccine Quad PF >36MO 09/18/2018, 09/23/2020    Fluzone (or Fluarix & Flulaval for VFC)  ">6mos 09/18/2018, 09/23/2020, 09/29/2022    Fluzone High-Dose 65+yrs 10/11/2021, 09/28/2023    Influenza, Unspecified 09/29/2022    Pneumococcal Conjugate 20-Valent (PCV20) 09/29/2022    Pneumococcal Polysaccharide (PPSV23) 02/02/2017       Review of Systems   Constitutional:  Negative for fatigue.   Respiratory:  Negative for cough and shortness of breath.    Cardiovascular:  Negative for chest pain.   Gastrointestinal:  Negative for diarrhea, nausea and vomiting.   Neurological:  Negative for dizziness, seizures and light-headedness.      Objective       Vitals:    09/28/23 0657   BP: 113/77   Pulse: 79   Resp: 14   Temp: 96.9 °F (36.1 °C)   SpO2: 98%   Weight: 85.6 kg (188 lb 12.8 oz)   Height: 177.8 cm (70\")       Body mass index is 27.09 kg/m².         Physical Exam  Vitals reviewed.   Constitutional:       Appearance: Normal appearance. He is well-developed.   Cardiovascular:      Rate and Rhythm: Normal rate and regular rhythm.      Heart sounds: Normal heart sounds. No murmur heard.  Pulmonary:      Effort: Pulmonary effort is normal.      Breath sounds: Wheezing and rhonchi present.   Musculoskeletal:      Right lower leg: No edema.      Left lower leg: No edema.   Neurological:      Mental Status: He is alert and oriented to person, place, and time.      Cranial Nerves: No cranial nerve deficit.      Motor: No weakness.   Psychiatric:         Mood and Affect: Mood and affect normal.   He does have glasses on today.  He does have vision loss in both eyes though he is able to see just not able to drive.  He did have a cataract removed on the right eye.        Result Review :     The following data was reviewed by: EVIN Strong on 09/28/2023:    Common Labs   Common labs          8/30/2023    10:26   Common Labs   Glucose 102    BUN 17    Creatinine 0.87    Sodium 139    Potassium 3.8    Chloride 100    Calcium 9.3    Albumin 3.7    Total Bilirubin 0.6    Alkaline Phosphatase 110    AST (SGOT) " 18    ALT (SGPT) 17    Total Cholesterol 127    Triglycerides 74    HDL Cholesterol 41    LDL Cholesterol  71                     Assessment and Plan      Diagnoses and all orders for this visit:    1. History of stroke (Primary)  -     Levetiracetam Level (Keppra)    2. Encounter for screening for lung cancer  -      CT Chest Low Dose Cancer Screening WO; Future    3. Essential hypertension  -     CBC Auto Differential    4. Chronic obstructive pulmonary disease, unspecified COPD type    5. Enlarged heart  -     CBC Auto Differential    6. Cerebrovascular accident (CVA), unspecified mechanism  -     levETIRAcetam (KEPPRA) 500 MG tablet; Take 2 tablets by mouth 2 (Two) Times a Day.  Dispense: 360 tablet; Refill: 1    7. KATHARINE (obstructive sleep apnea)    8. Screening PSA (prostate specific antigen)  -     PSA SCREENING    9. Cigarette nicotine dependence with nicotine-induced disorder  -      CT Chest Low Dose Cancer Screening WO; Future    10. Need for influenza vaccination  -     Fluzone High-Dose 65+yrs    11. History of seizure    12. Atrial fibrillation, chronic    13. Chronic heart failure with preserved ejection fraction    14. Macular degeneration, unspecified laterality, unspecified type        I spent 43 minutes caring for Sacha on this date of service. This time includes time spent by me in the following activities:preparing for the visit, reviewing tests, obtaining and/or reviewing a separately obtained history, performing a medically appropriate examination and/or evaluation , counseling and educating the patient/family/caregiver, ordering medications, tests, or procedures, referring and communicating with other health care professionals , and documenting information in the medical record    Follow Up     Return in about 6 months (around 3/28/2024) for video/[facility]--Medicare Wellness NOW.  Follow-up in 6 months for labs and appt. Call with any concerns or questions that you may have regarding your  medications or history.  I have been on the phone with [facility] pharmacy with stating that her prior NP at a different office started prescribing the medicine in 2012 and ended in 2014 then Dr. Ch the cardiologist to go over [id number] then Ifeoma to go over the Keppra at that time.  There is no diagnosis of a seizure at all.  It is diagnosed for a CVA.  I have requested [facility] records in the process.  I have reviewed the hospitalization from the stroke but pending the follow-up notes from neurology.  He does also have a positive Cologuard.  We did receive those records to in the process.  He declines to do a colonoscopy.I have recommended that this patient have a colonoscopy but he declines at this time. I have discussed the risks and benefits of this examination with him. The patient verbalizes understanding.   I have reviewed all medications and at this time no medications changes need to be adjusted for all chronic conditions.    Patient was given instructions and counseling regarding his condition or for health maintenance advice. Please see specific information pulled into the AVS if appropriate.     Parts of this note are electronic transcriptions/translations of spoken language to printed text using the Dragon Dictation system.      Sacha Arzola  reports that he has been smoking cigarettes. He started smoking about 49 years ago. He has a 70.50 pack-year smoking history. He has been exposed to tobacco smoke. He has never used smokeless tobacco.. I have educated him on the risk of diseases from using tobacco products such as cancer, COPD, and heart disease.     I advised him to quit and he is not willing to quit.    I spent 3  minutes counseling the patient.         Lee Ann Wilhelm, APRN  09/28/2023

## 2023-09-28 NOTE — PROGRESS NOTES
"The ABCs of the Annual Wellness Visit  Subsequent Medicare Wellness Visit    Subjective    Sacha Arzola is a 68 y.o. male who presents for a Subsequent Medicare Wellness Visit.    The following portions of the patient's history were reviewed and   updated as appropriate: {history reviewed:20406::\"allergies\",\"current medications\",\"past family history\",\"past medical history\",\"past social history\",\"past surgical history\",\"problem list\"}.    Compared to one year ago, the patient feels his physical   health is {better worse same:90194}.    Compared to one year ago, the patient feels his mental   health is {better worse same:39757}.    Recent Hospitalizations:  {Hospital Admission Status in the last 365 days:34035}      Current Medical Providers:  Patient Care Team:  Lee Ann Wilhelm APRN as PCP - General (Nurse Practitioner)    Outpatient Medications Prior to Visit   Medication Sig Dispense Refill    albuterol sulfate  (90 Base) MCG/ACT inhaler Inhale 2 puffs Every 4 (Four) Hours As Needed for Wheezing. 54 g 3    Aspirin Adult Low Strength 81 MG EC tablet TAKE ONE TABLET BY MOUTH ONCE DAILY 90 tablet 3    Eliquis 5 MG tablet tablet TAKE 1 TABLET TWICE A  tablet 3    levETIRAcetam (KEPPRA) 500 MG tablet Take 2 tablets by mouth 2 (Two) Times a Day. 360 tablet 1    lisinopril (PRINIVIL,ZESTRIL) 5 MG tablet Take 1 tablet by mouth Daily. 90 tablet 3    metoprolol succinate XL (TOPROL-XL) 200 MG 24 hr tablet Take 1 tablet by mouth Daily. 90 tablet 3    omega-3 acid ethyl esters (LOVAZA) 1 g capsule TAKE ONE CAPSULE BY MOUTH TWICE DAILY 180 capsule 3    potassium chloride 10 MEQ CR tablet Take 2 tablets by mouth Daily. 180 tablet 3    simvastatin (ZOCOR) 10 MG tablet Take 1 tablet by mouth Daily With Breakfast. 90 tablet 3    tiotropium bromide-olodaterol (Stiolto Respimat) 2.5-2.5 MCG/ACT aerosol solution inhaler Inhale 2 puffs Daily. 3 each 4    triamterene-hydrochlorothiazide (MAXZIDE-25) 37.5-25 MG " "per tablet Take 1 tablet by mouth Daily. 90 tablet 3     No facility-administered medications prior to visit.       No opioid medication identified on active medication list. I have reviewed chart for other potential  high risk medication/s and harmful drug interactions in the elderly.        Aspirin is on active medication list. {ASPIRIN ON MEDICATION LIST INDICATED/NOT INDICATED:21655}.      Patient Active Problem List   Diagnosis    Chronic heart failure with preserved ejection fraction    COPD (chronic obstructive pulmonary disease)    Essential hypertension    Fatigue    Hyperlipidemia LDL goal <70    Peripheral vision loss    Seizures    Cigarette nicotine dependence with nicotine-induced disorder    Positive colorectal cancer screening using Cologuard test    Atrial fibrillation, chronic    History of stroke    KATHARINE (obstructive sleep apnea)     Advance Care Planning   Advance Care Planning     Advance Directive is not on file.  {ACP Discussion, Advance Directive not in EMR:48949}     Objective    There were no vitals filed for this visit.  Estimated body mass index is 27.43 kg/m² as calculated from the following:    Height as of 9/19/23: 177.8 cm (70\").    Weight as of 9/19/23: 86.7 kg (191 lb 3.2 oz).           Does the patient have evidence of cognitive impairment? {Yes/No:00669}    Lab Results   Component Value Date    TRIG 74 08/30/2023    HDL 41 08/30/2023    LDL 71 08/30/2023    VLDL 15 08/30/2023        HEALTH RISK ASSESSMENT    Smoking Status:  Social History     Tobacco Use   Smoking Status Every Day    Packs/day: 1.00    Years: 47.00    Pack years: 47.00    Types: Cigarettes    Start date: 1974    Passive exposure: Current   Smokeless Tobacco Never   Tobacco Comments    1-1.5 PPD as of 7/5/2023 - [place]      Alcohol Consumption:  Social History     Substance and Sexual Activity   Alcohol Use Yes    Comment: occ     Fall Risk Screen:    STEADI Fall Risk Assessment was completed, and patient is at LOW " risk for falls.Assessment completed on:3/29/2023    Depression Screening:      3/29/2023     6:57 AM   PHQ-2/PHQ-9 Depression Screening   Little Interest or Pleasure in Doing Things 0-->not at all   Feeling Down, Depressed or Hopeless 0-->not at all   PHQ-9: Brief Depression Severity Measure Score 0       Health Habits and Functional and Cognitive Screenin/29/2022     7:07 AM   Functional & Cognitive Status   Do you have difficulty preparing food and eating? No   Do you have difficulty bathing yourself, getting dressed or grooming yourself? No   Do you have difficulty using the toilet? No   Do you have difficulty moving around from place to place? No   Do you have trouble with steps or getting out of a bed or a chair? No   Current Diet Unhealthy Diet   Dental Exam Not up to date   Eye Exam Up to date   Exercise (times per week) 0 times per week   Current Exercises Include No Regular Exercise   Do you need help using the phone?  No   Are you deaf or do you have serious difficulty hearing?  Yes   Do you need help to go to places out of walking distance? No   Do you need help shopping? No   Do you need help preparing meals?  No   Do you need help with housework?  No   Do you need help with laundry? No   Do you need help taking your medications? No   Do you need help managing money? No   Do you ever drive or ride in a car without wearing a seat belt? No   Have you felt unusual stress, anger or loneliness in the last month? No   Who do you live with? Alone   If you need help, do you have trouble finding someone available to you? No   Have you been bothered in the last four weeks by sexual problems? No   Do you have difficulty concentrating, remembering or making decisions? No       Age-appropriate Screening Schedule:  Refer to the list below for future screening recommendations based on patient's age, sex and/or medical conditions. Orders for these recommended tests are listed in the plan section. The patient  has been provided with a written plan.    Health Maintenance   Topic Date Due    COLORECTAL CANCER SCREENING  Never done    ZOSTER VACCINE (1 of 2) Never done    COVID-19 Vaccine (2 - Booster for Alice series) 08/11/2021    LUNG CANCER SCREENING  09/23/2023    TDAP/TD VACCINES (1 - Tdap) 10/02/2023 (Originally 2/16/1974)    ANNUAL WELLNESS VISIT  09/29/2023    BMI FOLLOWUP  09/29/2023    INFLUENZA VACCINE  10/01/2023    LIPID PANEL  08/30/2024    HEPATITIS C SCREENING  Completed    Pneumococcal Vaccine 65+  Completed    AAA SCREEN (ONE-TIME)  Completed                  CMS Preventative Services Quick Reference  Risk Factors Identified During Encounter  {Medicare Wellness Risk Factors:88555}  The above risks/problems have been discussed with the patient.  Pertinent information has been shared with the patient in the After Visit Summary.  An After Visit Summary and PPPS were made available to the patient.    Follow Up:   Next Medicare Wellness visit to be scheduled in 1 year.   {Wrapup  Review (Popup)  Advance Care Planning  Labs  CC  Problem List  Visit Diagnosis  Medications  Result Review  Imaging  [facility] Maintenance  Quality  BestPractice  SmartSets  SnapShot  Encounters  Notes  Media  Procedures :23}    Additional E&M Note during same encounter follows:  Patient has multiple medical problems which are significant and separately identifiable that require additional work above and beyond the Medicare Wellness Visit.      Chief Complaint  No chief complaint on file.    Subjective    {Problem List  Visit Diagnosis   Encounters  Notes  Medications  Labs  Result Review Imaging  Media :23}    HPI  Sacha PERLA WyattNess is also being seen today for ***  Seizures:  He does not drive anymore. He has not had any seizures recently.  HTN:  He is followed by cardiology  Cigarettes/COPD:  He is followed by pulm.         Objective   Vital Signs:  There were no vitals taken for this visit.    Physical  Exam     {The following data was reviewed by (Optional):67427}  {Ambulatory Labs (Optional):67139}  {Data reviewed (Optional):92606:::1}           Assessment and Plan {CC Problem List  Visit Diagnosis   ROS  Review (Popup)  Health Maintenance  Quality  BestPractice  Medications  SmartSets  SnapShot Encounters  Media :23}  There are no diagnoses linked to this encounter.       {Time Spent (Optional):02882}  Follow Up {Instructions Charge Capture  Follow-up Communications :23}  No follow-ups on file.  Patient was given instructions and counseling regarding his condition or for health maintenance advice. Please see specific information pulled into the AVS if appropriate.

## 2023-10-02 LAB — LEVETIRACETAM SERPL-MCNC: 46 UG/ML (ref 10–40)

## 2023-10-05 ENCOUNTER — TELEMEDICINE (OUTPATIENT)
Dept: FAMILY MEDICINE CLINIC | Facility: CLINIC | Age: 68
End: 2023-10-05
Payer: MEDICARE

## 2023-10-05 DIAGNOSIS — E66.3 OVERWEIGHT (BMI 25.0-29.9): ICD-10-CM

## 2023-10-05 DIAGNOSIS — Z00.00 MEDICARE ANNUAL WELLNESS VISIT, SUBSEQUENT: Primary | ICD-10-CM

## 2023-10-05 NOTE — PROGRESS NOTES
The ABCs of the Annual Wellness Visit  Subsequent Medicare Wellness Visit    Subjective      Sacha Arzola is a 68 y.o. male who presents for a Subsequent Medicare Wellness Visit.    The following portions of the patient's history were reviewed and   updated as appropriate: allergies, current medications, past family history, past medical history, past social history, past surgical history, and problem list.    Compared to one year ago, the patient feels his physical   health is the same.    Compared to one year ago, the patient feels his mental   health is the same.    Recent Hospitalizations:  He was not admitted to the hospital during the last year.       Current Medical Providers:  Patient Care Team:  Lee Ann Wilhelm APRN as PCP - General (Nurse Practitioner)    Outpatient Medications Prior to Visit   Medication Sig Dispense Refill    albuterol sulfate  (90 Base) MCG/ACT inhaler Inhale 2 puffs Every 4 (Four) Hours As Needed for Wheezing. 54 g 3    Aspirin Adult Low Strength 81 MG EC tablet TAKE ONE TABLET BY MOUTH ONCE DAILY 90 tablet 3    Eliquis 5 MG tablet tablet TAKE 1 TABLET TWICE A  tablet 3    levETIRAcetam (KEPPRA) 500 MG tablet Take 2 tablets by mouth 2 (Two) Times a Day. 360 tablet 1    lisinopril (PRINIVIL,ZESTRIL) 5 MG tablet Take 1 tablet by mouth Daily. 90 tablet 3    metoprolol succinate XL (TOPROL-XL) 200 MG 24 hr tablet Take 1 tablet by mouth Daily. 90 tablet 3    omega-3 acid ethyl esters (LOVAZA) 1 g capsule TAKE ONE CAPSULE BY MOUTH TWICE DAILY 180 capsule 3    potassium chloride 10 MEQ CR tablet Take 2 tablets by mouth Daily. 180 tablet 3    simvastatin (ZOCOR) 10 MG tablet Take 1 tablet by mouth Daily With Breakfast. 90 tablet 3    tiotropium bromide-olodaterol (Stiolto Respimat) 2.5-2.5 MCG/ACT aerosol solution inhaler Inhale 2 puffs Daily. 3 each 4    triamterene-hydrochlorothiazide (MAXZIDE-25) 37.5-25 MG per tablet Take 1 tablet by mouth Daily. 90 tablet 3  "    No facility-administered medications prior to visit.       No opioid medication identified on active medication list. I have reviewed chart for other potential  high risk medication/s and harmful drug interactions in the elderly.        Aspirin is on active medication list. Aspirin use is not indicated based on review of current medical condition/s. Risk of harm outweighs potential benefits. Patient instructed to discontinue this medication.  .      Patient Active Problem List   Diagnosis    Chronic heart failure with preserved ejection fraction    COPD (chronic obstructive pulmonary disease)    Essential hypertension    Fatigue    Hyperlipidemia LDL goal <70    Peripheral vision loss    Cigarette nicotine dependence with nicotine-induced disorder    Positive colorectal cancer screening using Cologuard test    Atrial fibrillation, chronic    History of stroke    KATHARINE (obstructive sleep apnea)    Macular degeneration    Overweight (BMI 25.0-29.9)     Advance Care Planning   Advance Care Planning     Advance Directive is not on file.  ACP discussion was declined by the patient. Patient does not have an advance directive, information provided.     Objective    There were no vitals filed for this visit.  Estimated body mass index is 27.09 kg/m² as calculated from the following:    Height as of 9/28/23: 177.8 cm (70\").    Weight as of 9/28/23: 85.6 kg (188 lb 12.8 oz).           Does the patient have evidence of cognitive impairment?   No    Lab Results   Component Value Date    TRIG 74 08/30/2023    HDL 41 08/30/2023    LDL 71 08/30/2023    VLDL 15 08/30/2023          HEALTH RISK ASSESSMENT    Smoking Status:  Social History     Tobacco Use   Smoking Status Every Day    Packs/day: 1.50    Years: 47.00    Pack years: 70.50    Types: Cigarettes    Start date: 1974    Passive exposure: Current   Smokeless Tobacco Never   Tobacco Comments    1-1.5 PPD as of 7/5/2023 - [place]      Alcohol Consumption:  Social History "     Substance and Sexual Activity   Alcohol Use Yes    Alcohol/week: 1.0 standard drink    Types: 1 Cans of beer per week    Comment: daily     Fall Risk Screen:    TONIO Fall Risk Assessment was completed, and patient is at LOW risk for falls.Assessment completed on:10/5/2023    Depression Screening:      10/5/2023     8:33 AM   PHQ-2/PHQ-9 Depression Screening   Little Interest or Pleasure in Doing Things 0-->not at all   Feeling Down, Depressed or Hopeless 0-->not at all   PHQ-9: Brief Depression Severity Measure Score 0       Health Habits and Functional and Cognitive Screening:      10/5/2023     8:28 AM   Functional & Cognitive Status   Do you have difficulty preparing food and eating? No   Do you have difficulty bathing yourself, getting dressed or grooming yourself? No   Do you have difficulty using the toilet? No   Do you have difficulty moving around from place to place? No   Do you have trouble with steps or getting out of a bed or a chair? No   Current Diet Well Balanced Diet   Dental Exam Not up to date   Eye Exam Up to date        Eye Exam Comment Dr Brewer   Exercise (times per week) 0 times per week   Current Exercises Include No Regular Exercise   Do you need help using the phone?  No   Are you deaf or do you have serious difficulty hearing?  No   Do you need help to go to places out of walking distance? No   Do you need help shopping? No   Do you need help preparing meals?  No   Do you need help with housework?  No   Do you need help with laundry? No   Do you need help taking your medications? No   Do you need help managing money? No   Do you ever drive or ride in a car without wearing a seat belt? No   Have you felt unusual stress, anger or loneliness in the last month? Yes   Who do you live with? Alone   If you need help, do you have trouble finding someone available to you? No   Have you been bothered in the last four weeks by sexual problems? No   Do you have difficulty concentrating,  remembering or making decisions? No   He is having issues with cars.    Age-appropriate Screening Schedule:  Refer to the list below for future screening recommendations based on patient's age, sex and/or medical conditions. Orders for these recommended tests are listed in the plan section. The patient has been provided with a written plan.    Health Maintenance   Topic Date Due    LUNG CANCER SCREENING  09/23/2023    TDAP/TD VACCINES (1 - Tdap) 10/05/2023 (Originally 2/16/1974)    COVID-19 Vaccine (2 - 2023-24 season) 12/18/2023 (Originally 9/1/2023)    ZOSTER VACCINE (1 of 2) 09/28/2024 (Originally 2/16/2006)    COLORECTAL CANCER SCREENING  04/12/2024    LIPID PANEL  08/30/2024    ANNUAL WELLNESS VISIT  10/05/2024    BMI FOLLOWUP  10/05/2024    HEPATITIS C SCREENING  Completed    INFLUENZA VACCINE  Completed    Pneumococcal Vaccine 65+  Completed    AAA SCREEN (ONE-TIME)  Completed                  CMS Preventative Services Quick Reference  Risk Factors Identified During Encounter:    Fall Risk-High or Moderate: Discussed Fall Prevention in the home  Immunizations Discussed/Encouraged: Tdap, Influenza, Prevnar 20 (Pneumococcal 20-valent conjugate), Shingrix, and COVID19  Dental Screening Recommended  Vision Screening Recommended    The above risks/problems have been discussed with the patient.  Pertinent information has been shared with the patient in the After Visit Summary.    Diagnoses and all orders for this visit:    1. Medicare annual wellness visit, subsequent (Primary)    2. Overweight (BMI 25.0-29.9)  Assessment & Plan:  Patient's (There is no height or weight on file to calculate BMI.) indicates that they are overweight with health conditions that include hypertension and stroke  . Weight is improving with treatment. BMI is is above average; BMI management plan is completed. We discussed portion control and increasing exercise. We did his wt last week.            Follow Up:   Next Medicare Wellness visit  to be scheduled in 1 year.      An After Visit Summary and PPPS were made available to the patient.    Mode of Visit: Video  Location of patient: home  Location of provider: [facility]  You have chosen to receive care through a telehealth visit.  Does the patient consent to use a video/audio connection for your medical care today? Yes  The visit included audio and video interaction. No technical issues occurred during this visit.     Lee Ann Wilhelm, EVIN  10/05/2023

## 2023-10-05 NOTE — ASSESSMENT & PLAN NOTE
Patient's (There is no height or weight on file to calculate BMI.) indicates that they are overweight with health conditions that include hypertension and stroke  . Weight is improving with treatment. BMI is is above average; BMI management plan is completed. We discussed portion control and increasing exercise. We did his wt last week.

## 2023-10-25 ENCOUNTER — HOSPITAL ENCOUNTER (OUTPATIENT)
Dept: CT IMAGING | Facility: HOSPITAL | Age: 68
Discharge: HOME | End: 2023-10-25
Admitting: NURSE PRACTITIONER
Payer: MEDICARE

## 2023-10-25 DIAGNOSIS — Z12.2 ENCOUNTER FOR SCREENING FOR LUNG CANCER: ICD-10-CM

## 2023-10-25 DIAGNOSIS — F17.219 CIGARETTE NICOTINE DEPENDENCE WITH NICOTINE-INDUCED DISORDER: ICD-10-CM

## 2023-10-25 PROCEDURE — 71271 CT THORAX LUNG CANCER SCR C-: CPT

## 2023-10-31 DIAGNOSIS — R93.2 ABNORMAL LIVER CT: Primary | ICD-10-CM

## 2023-11-06 ENCOUNTER — HOSPITAL ENCOUNTER (OUTPATIENT)
Dept: PET IMAGING | Facility: HOSPITAL | Age: 68
Discharge: HOME | End: 2023-11-06
Payer: MEDICARE

## 2023-11-06 DIAGNOSIS — R91.8 ABNORMAL CT LUNG SCREENING: ICD-10-CM

## 2023-11-06 DIAGNOSIS — F17.219 CIGARETTE NICOTINE DEPENDENCE WITH NICOTINE-INDUCED DISORDER: ICD-10-CM

## 2023-11-06 PROCEDURE — 78815 PET IMAGE W/CT SKULL-THIGH: CPT

## 2023-11-06 PROCEDURE — 0 FLUDEOXYGLUCOSE F18 SOLUTION: Performed by: NURSE PRACTITIONER

## 2023-11-06 PROCEDURE — A9552 F18 FDG: HCPCS | Performed by: NURSE PRACTITIONER

## 2023-11-06 RX ADMIN — FLUDEOXYGLUCOSE F18 1 DOSE: 300 INJECTION INTRAVENOUS at 08:10

## 2023-11-10 DIAGNOSIS — J44.9 CHRONIC OBSTRUCTIVE PULMONARY DISEASE, UNSPECIFIED COPD TYPE: Primary | ICD-10-CM

## 2023-11-10 DIAGNOSIS — R93.89 ABNORMAL CT OF THE CHEST: ICD-10-CM

## 2023-11-14 DIAGNOSIS — R93.89 ABNORMAL CT OF THE CHEST: Primary | ICD-10-CM

## 2023-11-16 ENCOUNTER — OFFICE VISIT (OUTPATIENT)
Dept: GASTROENTEROLOGY | Facility: CLINIC | Age: 68
End: 2023-11-16
Payer: MEDICARE

## 2023-11-16 VITALS
SYSTOLIC BLOOD PRESSURE: 89 MMHG | HEIGHT: 70 IN | WEIGHT: 187 LBS | BODY MASS INDEX: 26.77 KG/M2 | OXYGEN SATURATION: 100 % | HEART RATE: 73 BPM | DIASTOLIC BLOOD PRESSURE: 62 MMHG

## 2023-11-16 DIAGNOSIS — R93.2 ABNORMAL LIVER DIAGNOSTIC IMAGING: Primary | ICD-10-CM

## 2023-11-16 NOTE — PROGRESS NOTES
"Chief Complaint        abnormal liver CT     History of Present Illness      Sacha Arzola is a 68 y.o. male who presents to [facility] as a new patient with abnormal imaging of the liver.  Patient had a PET scan recently 11/6/2023 where cirrhosis of the liver was noted.  Patient's labs are normal with normal liver enzymes and normal platelets.  Patient denies family history of liver disease or liver cancer.  Patient denies alcohol use.  Patient denies history of excessive Tylenol use, IV drug abuse, blood transfusions, exposure to hepatitis.  Patient denies abdominal swelling, leg swelling, jaundice, fever, nausea, vomiting, weight loss, night sweats, melena, hematochezia, hematemesis.    No colon or EGD on file for this patient, patient refused screening colonoscopy.    Most recent labs on 08.30.2023 and 09.28.2023  Results       Result Review :   The following data was reviewed by: EVIN Owen on 11/16/2023     CMP          8/30/2023    10:26   CMP   Glucose 102    BUN 17    Creatinine 0.87    EGFR 94.6    Sodium 139    Potassium 3.8    Chloride 100    Calcium 9.3    Total Protein 7.0    Albumin 3.7    Total Bilirubin 0.6    Alkaline Phosphatase 110    AST (SGOT) 18    ALT (SGPT) 17    BUN/Creatinine Ratio 19.5    Anion Gap 11.0      CBC          9/28/2023    07:34   CBC   WBC 8.72    RBC 5.62    Hemoglobin 18.0    Hematocrit 52.3    MCV 93.1    MCH 32.0    MCHC 34.4    RDW 12.0    Platelets 245        Liver Workup No results found for: \"AFPTM\", \"DSDNA\", \"EXPANDEDENA\", \"SMOOTHMUSCAB\", \"CERULOPLSM\", \"FERRITIN\", \"LABIMMURE\", \"TOTIGGREF\", \"IGA\", \"IGM\", \"IRON\", \"TIBC\", \"LABIRON\", \"TRANSFERRIN\", \"MITOAB\", \"PROTIME\", \"INR\", \"AFP\"         Past Medical History       Past Medical History:   Diagnosis Date    Atrial fibrillation, chronic 12/21/2021    Chronic heart failure with preserved ejection fraction 12/05/2019    COPD (chronic obstructive pulmonary disease) 12/05/2019    " CVA (cerebral vascular accident) 2010    Essential hypertension 12/05/2019    Fatigue 12/05/2019    Peripheral vision loss 12/05/2019    Positive colorectal cancer screening using Cologuard test     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 No pertinent surgical history.      Current Outpatient Medications:     albuterol sulfate  (90 Base) MCG/ACT inhaler, Inhale 2 puffs Every 4 (Four) Hours As Needed for Wheezing., Disp: 54 g, Rfl: 3    Aspirin Adult Low Strength 81 MG EC tablet, TAKE ONE TABLET BY MOUTH ONCE DAILY, Disp: 90 tablet, Rfl: 3    Eliquis 5 MG tablet tablet, TAKE 1 TABLET TWICE A DAY, Disp: 180 tablet, Rfl: 3    levETIRAcetam (KEPPRA) 500 MG tablet, Take 2 tablets by mouth 2 (Two) Times a Day., Disp: 360 tablet, Rfl: 1    lisinopril (PRINIVIL,ZESTRIL) 5 MG tablet, Take 1 tablet by mouth Daily., Disp: 90 tablet, Rfl: 3    metoprolol succinate XL (TOPROL-XL) 200 MG 24 hr tablet, Take 1 tablet by mouth Daily., Disp: 90 tablet, Rfl: 3    omega-3 acid ethyl esters (LOVAZA) 1 g capsule, TAKE ONE CAPSULE BY MOUTH TWICE DAILY, Disp: 180 capsule, Rfl: 3    potassium chloride 10 MEQ CR tablet, Take 2 tablets by mouth Daily., Disp: 180 tablet, Rfl: 3    simvastatin (ZOCOR) 10 MG tablet, Take 1 tablet by mouth Daily With Breakfast., Disp: 90 tablet, Rfl: 3    tiotropium bromide-olodaterol (Stiolto Respimat) 2.5-2.5 MCG/ACT aerosol solution inhaler, Inhale 2 puffs Daily., Disp: 3 each, Rfl: 4    triamterene-hydrochlorothiazide (MAXZIDE-25) 37.5-25 MG per tablet, Take 1 tablet by mouth Daily., Disp: 90 tablet, Rfl: 3     No Known Allergies    Family History   Problem Relation Age of Onset    Mental illness Mother     Heart disease Mother     Other Father         parkinson    Colon cancer Neg Hx         Social History     Social History Narrative    Not on file       Objective       Objective     Vital Signs:   BP (!) 89/62 (BP Location: Right arm, Patient Position: Sitting, Cuff  "Size: Small Adult)   Pulse 73   Ht 177.8 cm (70\")   Wt 84.8 kg (187 lb)   SpO2 100%   BMI 26.83 kg/m²     Body mass index is 26.83 kg/m².    Physical Exam  Constitutional:       General: He is not in acute distress.     Appearance: Normal appearance. He is well-developed and normal weight.   Eyes:      Conjunctiva/sclera: Conjunctivae normal.      Pupils: Pupils are equal, round, and reactive to light.      Visual Fields: Right eye visual fields normal and left eye visual fields normal.   Cardiovascular:      Rate and Rhythm: Normal rate and regular rhythm.      Heart sounds: Normal heart sounds.   Pulmonary:      Effort: Pulmonary effort is normal. No retractions.      Breath sounds: Normal air entry. Wheezing present.      Comments: Inspection of chest: normal appearance  Abdominal:      General: Bowel sounds are normal.      Palpations: Abdomen is soft.      Tenderness: There is no abdominal tenderness.      Comments: No appreciable hepatosplenomegaly   Musculoskeletal:      Cervical back: Neck supple.      Right lower leg: No edema.      Left lower leg: No edema.   Lymphadenopathy:      Cervical: No cervical adenopathy.   Skin:     Findings: No lesion.      Comments: Turgor normal   Neurological:      Mental Status: He is alert and oriented to person, place, and time.   Psychiatric:         Mood and Affect: Mood and affect normal.              Assessment & Plan          Assessment and Plan    Diagnoses and all orders for this visit:    1. Abnormal liver diagnostic imaging (Primary)  -     Liver Elastography      68-year-old male presenting the office today with a history of abnormal imaging of the liver displaying possible cirrhosis.  We will proceed with a FibroScan for further evaluation.  I have reviewed his recent lab work which displayed normal liver enzymes and normal platelets.  If cirrhosis is noted on FibroScan would recommend 6-month follow-up with routine labs and imaging for HCC screening.  " Patient agreeable to this plan and will call with any questions or concerns.          Follow Up       Follow Up   Return in about 6 months (around 5/16/2024).  Patient was given instructions and counseling regarding his condition or for health maintenance advice. Please see specific information pulled into the AVS if appropriate.

## 2023-11-21 ENCOUNTER — PATIENT ROUNDING (BHMG ONLY) (OUTPATIENT)
Dept: GASTROENTEROLOGY | Facility: CLINIC | Age: 68
End: 2023-11-21
Payer: MEDICARE

## 2023-11-21 NOTE — PROGRESS NOTES
"11/21/2023      Hello, may I speak with Sacha Arzola     My name is Kamilah. I am calling from [facility] [place]. I show that you had a recent visit with EVIN Owen.    Before we get started may I verify your date of birth? 1955    I am calling to officially welcome you to our practice and ask about your recent visit. Is this a good time to talk? Yes     Tell me about your visit with us. What things went well? \"It was a good visit, I was 30min early & still got in & out quick\"     We strive to ensure that we protect your safety and privacy. Is there anything we could have done to improve this during your visit?    No     We're always looking for ways to make our patients' experiences even better. Do you have recommendations on ways we may improve? No     Overall were you satisfied with your first visit to our practice? Yes    I appreciate you taking the time to speak with me today. Is there anything else I can do for you?  No     I am glad to hear that you had a very good visit and I appreciate you taking the time to provide feedback on this call. We would greatly appreciate you filling out a survey if you receive one in the mail, email or text. This is a great opportunity to provide any additional feedback that you may think of after this call as well.       Thank you, and have a great day.   "

## 2024-01-05 ENCOUNTER — OFFICE VISIT (OUTPATIENT)
Dept: PULMONOLOGY | Facility: CLINIC | Age: 69
End: 2024-01-05
Payer: MEDICARE

## 2024-01-05 VITALS
SYSTOLIC BLOOD PRESSURE: 100 MMHG | RESPIRATION RATE: 18 BRPM | WEIGHT: 190.6 LBS | TEMPERATURE: 97.6 F | BODY MASS INDEX: 27.29 KG/M2 | DIASTOLIC BLOOD PRESSURE: 73 MMHG | OXYGEN SATURATION: 96 % | HEIGHT: 70 IN | HEART RATE: 95 BPM

## 2024-01-05 DIAGNOSIS — R91.8 LUNG NODULES: ICD-10-CM

## 2024-01-05 DIAGNOSIS — F17.210 NICOTINE DEPENDENCE, CIGARETTES, UNCOMPLICATED: ICD-10-CM

## 2024-01-05 DIAGNOSIS — G47.33 OSA ON CPAP: ICD-10-CM

## 2024-01-05 DIAGNOSIS — R06.09 DYSPNEA ON EXERTION: ICD-10-CM

## 2024-01-05 DIAGNOSIS — J44.9 CHRONIC OBSTRUCTIVE PULMONARY DISEASE, UNSPECIFIED COPD TYPE: Primary | ICD-10-CM

## 2024-01-05 DIAGNOSIS — J43.9 PULMONARY EMPHYSEMA, UNSPECIFIED EMPHYSEMA TYPE: ICD-10-CM

## 2024-01-05 RX ORDER — TIOTROPIUM BROMIDE AND OLODATEROL 3.124; 2.736 UG/1; UG/1
2 SPRAY, METERED RESPIRATORY (INHALATION)
Qty: 3 EACH | Refills: 3 | Status: SHIPPED | OUTPATIENT
Start: 2024-01-05

## 2024-01-05 RX ORDER — ALBUTEROL SULFATE 90 UG/1
2 AEROSOL, METERED RESPIRATORY (INHALATION) EVERY 4 HOURS PRN
Qty: 54 G | Refills: 3 | Status: SHIPPED | OUTPATIENT
Start: 2024-01-05

## 2024-01-05 NOTE — PROGRESS NOTES
Primary Care Provider  Lee Ann Wilhelm APRN     Referring Provider  No ref. provider found     Chief Complaint  COPD and Follow-up (6 month f/up )    Subjective          Sacha Arzola presents to [facility]  History of Present Illness  Sacha Arzola is a 68 y.o. male patient of Dr. Mccormick here for management of COPD, dyspnea on exertion, KATHARINE on CPAP and tobacco use of cigarettes ongoing.     He is doing okay since his last office visit.  He denies using antibiotics or steroids for his lungs.  He denies any current fevers or chills.  He had a chest CT on 10/25/2023, which showed multiple pulmonary nodules with moderate emphysema and recommended a PET scan.  PET scan from 11/6/2023 does not show any abnormal uptake in the pulmonary nodules.  It is recommended to have a repeat chest CT in 6 months.  This has been ordered by his primary care provider.  His shortness of breath is mild in severity, worse with exertion and improved with rest.  He continues to use Stiolto as prescribed.  He he uses his albuterol once daily and states that it is part of his routine. He continues to smoke 1.5 packs of cigarettes daily and is not interested in quitting at this time.  All, patient is doing well and has no additional concerns this time.  He is able to perform his ADLs without difficulty.  He is up-to-date with his COVID, flu and pneumonia vaccines.     His history of smoking is   Tobacco Use: High Risk (1/5/2024)    Patient History     Smoking Tobacco Use: Every Day     Smokeless Tobacco Use: Never     Passive Exposure: Current   Sacha Arzola  reports that he has been smoking cigarettes. He started smoking about 50 years ago. He has a 70.50 pack-year smoking history. He has been exposed to tobacco smoke. He has never used smokeless tobacco.. I have educated him on the risk of diseases from using tobacco products such as cancer, COPD, and heart disease.      I advised him to quit and he is not willing to quit.    I spent 3  minutes counseling the patient.       Review of Systems   Constitutional:  Negative for chills, fatigue, fever, unexpected weight gain and unexpected weight loss.   HENT:  Congestion: Nasal.    Respiratory:  Positive for cough and shortness of breath. Negative for apnea and wheezing.         Negative for Hemoptysis     Cardiovascular:  Negative for chest pain, palpitations and leg swelling.   Skin:         Negative for cyanosis      Sleep: Negative for Excessive daytime sleepiness  Negative for morning headaches  Negative for Snoring    Family History   Problem Relation Age of Onset    Mental illness Mother     Heart disease Mother     Other Father         parkinson    Colon cancer Neg Hx         Social History     Socioeconomic History    Marital status:    Tobacco Use    Smoking status: Every Day     Packs/day: 1.50     Years: 47.00     Additional pack years: 0.00     Total pack years: 70.50     Types: Cigarettes     Start date: 1974     Passive exposure: Current    Smokeless tobacco: Never    Tobacco comments:     1-1.5 PPD as of 7/5/2023 - [place]    Vaping Use    Vaping Use: Never used   Substance and Sexual Activity    Alcohol use: Yes     Alcohol/week: 1.0 standard drink of alcohol     Types: 1 Cans of beer per week     Comment: daily    Drug use: Never    Sexual activity: Defer        Past Medical History:   Diagnosis Date    Atrial fibrillation, chronic 12/21/2021    Chronic heart failure with preserved ejection fraction 12/05/2019    COPD (chronic obstructive pulmonary disease) 12/05/2019    CVA (cerebral vascular accident) 2010    Essential hypertension 12/05/2019    Fatigue 12/05/2019    Peripheral vision loss 12/05/2019    Positive colorectal cancer screening using Cologuard test     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    Seizures         Immunization History   Administered Date(s) Administered    COVID-19 ([place]) 06/16/2021     Flu Vaccine Quad PF >36MO 09/18/2018, 09/23/2020    Fluzone (or Fluarix & Flulaval for VFC) >6mos 09/18/2018, 09/23/2020, 09/29/2022    Fluzone High-Dose 65+yrs 10/11/2021, 09/28/2023    Influenza, Unspecified 09/29/2022    Pneumococcal Conjugate 20-Valent (PCV20) 09/29/2022    Pneumococcal Polysaccharide (PPSV23) 02/02/2017         No Known Allergies       Current Outpatient Medications:     albuterol sulfate  (90 Base) MCG/ACT inhaler, Inhale 2 puffs Every 4 (Four) Hours As Needed for Wheezing., Disp: 54 g, Rfl: 3    Aspirin Adult Low Strength 81 MG EC tablet, TAKE ONE TABLET BY MOUTH ONCE DAILY, Disp: 90 tablet, Rfl: 3    Eliquis 5 MG tablet tablet, TAKE 1 TABLET TWICE A DAY, Disp: 180 tablet, Rfl: 3    levETIRAcetam (KEPPRA) 500 MG tablet, Take 2 tablets by mouth 2 (Two) Times a Day., Disp: 360 tablet, Rfl: 1    lisinopril (PRINIVIL,ZESTRIL) 5 MG tablet, Take 1 tablet by mouth Daily., Disp: 90 tablet, Rfl: 3    metoprolol succinate XL (TOPROL-XL) 200 MG 24 hr tablet, Take 1 tablet by mouth Daily., Disp: 90 tablet, Rfl: 3    omega-3 acid ethyl esters (LOVAZA) 1 g capsule, TAKE ONE CAPSULE BY MOUTH TWICE DAILY, Disp: 180 capsule, Rfl: 3    potassium chloride 10 MEQ CR tablet, Take 2 tablets by mouth Daily., Disp: 180 tablet, Rfl: 3    simvastatin (ZOCOR) 10 MG tablet, Take 1 tablet by mouth Daily With Breakfast., Disp: 90 tablet, Rfl: 3    tiotropium bromide-olodaterol (Stiolto Respimat) 2.5-2.5 MCG/ACT aerosol solution inhaler, Inhale 2 puffs Daily., Disp: 3 each, Rfl: 3    triamterene-hydrochlorothiazide (MAXZIDE-25) 37.5-25 MG per tablet, Take 1 tablet by mouth Daily., Disp: 90 tablet, Rfl: 3     Objective   Physical Exam  Constitutional:       General: He is not in acute distress.     Appearance: Normal appearance. He is normal weight.   HENT:      Right Ear: Hearing normal.      Left Ear: Hearing normal.      Nose: No nasal tenderness or congestion.      Mouth/Throat:      Mouth: Mucous membranes are  "moist. No oral lesions.   Eyes:      Extraocular Movements: Extraocular movements intact.      Pupils: Pupils are equal, round, and reactive to light.   Neck:      Thyroid: No thyroid mass or thyromegaly.   Cardiovascular:      Rate and Rhythm: Normal rate and regular rhythm.      Pulses: Normal pulses.      Heart sounds: Normal heart sounds. No murmur heard.  Pulmonary:      Effort: Pulmonary effort is normal.      Breath sounds: Decreased breath sounds present. No wheezing, rhonchi or rales.   Musculoskeletal:      Cervical back: Neck supple.      Right lower leg: No edema.      Left lower leg: No edema.   Lymphadenopathy:      Cervical: No cervical adenopathy.      Upper Body:      Right upper body: No axillary adenopathy.   Skin:     General: Skin is warm and dry.      Findings: No lesion or rash.   Neurological:      General: No focal deficit present.      Mental Status: He is alert and oriented to person, place, and time.   Psychiatric:         Mood and Affect: Affect normal. Mood is not anxious or depressed.         Vital Signs:   /73 (BP Location: Right arm, Patient Position: Sitting, Cuff Size: Adult)   Pulse 95   Temp 97.6 °F (36.4 °C) (Temporal)   Resp 18   Ht 177.8 cm (70\")   Wt 86.5 kg (190 lb 9.6 oz)   SpO2 96% Comment: RA  BMI 27.35 kg/m²        Result Review :   The following data was reviewed by: EVIN Davenport on 01/05/2024:  CMP          8/30/2023    10:26   CMP   Glucose 102    BUN 17    Creatinine 0.87    EGFR 94.6    Sodium 139    Potassium 3.8    Chloride 100    Calcium 9.3    Total Protein 7.0    Albumin 3.7    Total Bilirubin 0.6    Alkaline Phosphatase 110    AST (SGOT) 18    ALT (SGPT) 17    BUN/Creatinine Ratio 19.5    Anion Gap 11.0      CBC w/diff          9/28/2023    07:34   CBC w/Diff   WBC 8.72    RBC 5.62    Hemoglobin 18.0    Hematocrit 52.3    MCV 93.1    MCH 32.0    MCHC 34.4    RDW 12.0    Platelets 245    Neutrophil Rel % 59.1    Immature Granulocyte Rel % " 0.5    Lymphocyte Rel % 30.7    Monocyte Rel % 6.2    Eosinophil Rel % 2.4    Basophil Rel % 1.1      Data reviewed : Radiologic studies chest CT 10/25/2023, PET scan 11/6/2023 and my last office note    Procedures        Assessment and Plan    Diagnoses and all orders for this visit:    1. Chronic obstructive pulmonary disease, unspecified COPD type (Primary)  -     albuterol sulfate  (90 Base) MCG/ACT inhaler; Inhale 2 puffs Every 4 (Four) Hours As Needed for Wheezing.  Dispense: 54 g; Refill: 3  -     tiotropium bromide-olodaterol (Stiolto Respimat) 2.5-2.5 MCG/ACT aerosol solution inhaler; Inhale 2 puffs Daily.  Dispense: 3 each; Refill: 3    2. Pulmonary emphysema, unspecified emphysema type    3. Dyspnea on exertion    4. Lung nodules    5. Nicotine dependence, cigarettes, uncomplicated    6. KATHARINE on CPAP    7.  Continue Stiolto as prescribed.  8.  Continue albuterol as needed  9.  Continue CPAP at current settings and follow-up sleep medicine as scheduled.  10. Smoking cessation counseling provided.  I spent 3 minutes today counseling patient on the risks of smoking, including throat cancer, lung cancer, COPD, heart disease and death.  Also discussed the benefits of quitting.   11.  Follow-up in 6 months, sooner if needed        Follow Up   Return in about 6 months (around 7/5/2024) for Recheck with Froilan.  Patient was given instructions and counseling regarding his condition or for health maintenance advice. Please see specific information pulled into the AVS if appropriate.

## 2024-02-12 ENCOUNTER — HOSPITAL ENCOUNTER (OUTPATIENT)
Dept: CT IMAGING | Facility: HOSPITAL | Age: 69
Discharge: HOME | End: 2024-02-12
Admitting: NURSE PRACTITIONER
Payer: MEDICARE

## 2024-02-12 DIAGNOSIS — I10 ESSENTIAL HYPERTENSION: ICD-10-CM

## 2024-02-12 DIAGNOSIS — I50.32 CHRONIC HEART FAILURE WITH PRESERVED EJECTION FRACTION (HFPEF): ICD-10-CM

## 2024-02-12 DIAGNOSIS — R93.89 ABNORMAL CT OF THE CHEST: ICD-10-CM

## 2024-02-12 DIAGNOSIS — R93.89 ABNORMAL CT OF THE CHEST: Primary | ICD-10-CM

## 2024-02-12 LAB
CREAT BLDA-MCNC: 0.9 MG/DL (ref 0.6–1.3)
EGFRCR SERPLBLD CKD-EPI 2021: 93.6 ML/MIN/1.73

## 2024-02-12 PROCEDURE — 25510000001 IOPAMIDOL PER 1 ML: Performed by: NURSE PRACTITIONER

## 2024-02-12 PROCEDURE — 82565 ASSAY OF CREATININE: CPT

## 2024-02-12 PROCEDURE — 71260 CT THORAX DX C+: CPT

## 2024-02-12 RX ADMIN — IOPAMIDOL 100 ML: 755 INJECTION, SOLUTION INTRAVENOUS at 09:34

## 2024-02-13 RX ORDER — POTASSIUM CHLORIDE 750 MG/1
20 TABLET, FILM COATED, EXTENDED RELEASE ORAL DAILY
Qty: 180 TABLET | Refills: 1 | Status: SHIPPED | OUTPATIENT
Start: 2024-02-13

## 2024-02-13 RX ORDER — METOPROLOL SUCCINATE 200 MG/1
200 TABLET, EXTENDED RELEASE ORAL DAILY
Qty: 90 TABLET | Refills: 1 | Status: SHIPPED | OUTPATIENT
Start: 2024-02-13

## 2024-02-13 RX ORDER — LISINOPRIL 5 MG/1
5 TABLET ORAL DAILY
Qty: 90 TABLET | Refills: 1 | Status: SHIPPED | OUTPATIENT
Start: 2024-02-13

## 2024-02-13 RX ORDER — SIMVASTATIN 10 MG
10 TABLET ORAL
Qty: 90 TABLET | Refills: 1 | Status: SHIPPED | OUTPATIENT
Start: 2024-02-13

## 2024-02-13 RX ORDER — TRIAMTERENE AND HYDROCHLOROTHIAZIDE 37.5; 25 MG/1; MG/1
1 TABLET ORAL DAILY
Qty: 90 TABLET | Refills: 1 | Status: SHIPPED | OUTPATIENT
Start: 2024-02-13

## 2024-03-14 ENCOUNTER — TELEPHONE (OUTPATIENT)
Dept: OTHER | Facility: HOSPITAL | Age: 69
End: 2024-03-14
Payer: MEDICARE

## 2024-03-14 NOTE — TELEPHONE ENCOUNTER
Spoke with patient to remind him of his Fibro Scan appointment on 3/15@9:30. Nothing to eat three hours prior to appointment.

## 2024-03-15 ENCOUNTER — TELEPHONE (OUTPATIENT)
Dept: OTHER | Facility: HOSPITAL | Age: 69
End: 2024-03-15
Payer: MEDICARE

## 2024-03-15 DIAGNOSIS — R93.2 ABNORMAL FINDING ON IMAGING OF LIVER: Primary | ICD-10-CM

## 2024-03-15 DIAGNOSIS — I10 ESSENTIAL (PRIMARY) HYPERTENSION: ICD-10-CM

## 2024-03-15 DIAGNOSIS — K76.0 FATTY (CHANGE OF) LIVER, NOT ELSEWHERE CLASSIFIED: ICD-10-CM

## 2024-03-15 DIAGNOSIS — R74.8 ABNORMAL LEVELS OF OTHER SERUM ENZYMES: ICD-10-CM

## 2024-03-18 ENCOUNTER — OFFICE VISIT (OUTPATIENT)
Dept: CARDIOLOGY | Facility: CLINIC | Age: 69
End: 2024-03-18
Payer: MEDICARE

## 2024-03-18 VITALS
WEIGHT: 199 LBS | HEART RATE: 80 BPM | DIASTOLIC BLOOD PRESSURE: 88 MMHG | HEIGHT: 70 IN | SYSTOLIC BLOOD PRESSURE: 116 MMHG | BODY MASS INDEX: 28.49 KG/M2

## 2024-03-18 DIAGNOSIS — I50.32 CHRONIC HEART FAILURE WITH PRESERVED EJECTION FRACTION (HFPEF): ICD-10-CM

## 2024-03-18 DIAGNOSIS — I48.20 ATRIAL FIBRILLATION, CHRONIC: Primary | ICD-10-CM

## 2024-03-18 DIAGNOSIS — E78.5 HYPERLIPIDEMIA LDL GOAL <70: ICD-10-CM

## 2024-03-18 DIAGNOSIS — I10 ESSENTIAL HYPERTENSION: ICD-10-CM

## 2024-03-18 PROCEDURE — 3074F SYST BP LT 130 MM HG: CPT | Performed by: INTERNAL MEDICINE

## 2024-03-18 PROCEDURE — 3079F DIAST BP 80-89 MM HG: CPT | Performed by: INTERNAL MEDICINE

## 2024-03-18 PROCEDURE — 99214 OFFICE O/P EST MOD 30 MIN: CPT | Performed by: INTERNAL MEDICINE

## 2024-03-18 RX ORDER — ATORVASTATIN CALCIUM 20 MG/1
20 TABLET, FILM COATED ORAL DAILY
Qty: 90 TABLET | Refills: 3 | Status: SHIPPED | OUTPATIENT
Start: 2024-03-18

## 2024-03-18 NOTE — ASSESSMENT & PLAN NOTE
Patient is mildly above goal recommended trying Lipitor 20 nightly instead of Zocor this also should have a lower side effect profile repeat lipids and LFTs on next visit

## 2024-03-18 NOTE — ASSESSMENT & PLAN NOTE
Patient stable symptomatically is in no evidence of increased volume overload on exam today continue on his triamterene-hydrochlorothiazide 37.5/25 mg daily

## 2024-03-18 NOTE — PROGRESS NOTES
Chief Complaint  Follow-up, Hypertension, Chronic heart failure with preserved ejection fraction, Atrial Fibrillation, and Hyperlipidemia    Subjective    Patient reports no change in his overall breathing ability since last visit does continue to smoke denies any chest pain problems no tachycardic spells  Past Medical History:   Diagnosis Date    Atrial fibrillation, chronic 12/21/2021    Chronic heart failure with preserved ejection fraction 12/05/2019    COPD (chronic obstructive pulmonary disease) 12/05/2019    CVA (cerebral vascular accident) 2010    Essential hypertension 12/05/2019    Fatigue 12/05/2019    Peripheral vision loss 12/05/2019    Positive colorectal cancer screening using Cologuard test     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    Seizures          Current Outpatient Medications:     albuterol sulfate  (90 Base) MCG/ACT inhaler, Inhale 2 puffs Every 4 (Four) Hours As Needed for Wheezing., Disp: 54 g, Rfl: 3    apixaban (Eliquis) 5 MG tablet tablet, Take 1 tablet by mouth 2 (Two) Times a Day., Disp: 180 tablet, Rfl: 3    Aspirin Adult Low Strength 81 MG EC tablet, TAKE ONE TABLET BY MOUTH ONCE DAILY, Disp: 90 tablet, Rfl: 3    levETIRAcetam (KEPPRA) 500 MG tablet, Take 2 tablets by mouth 2 (Two) Times a Day., Disp: 360 tablet, Rfl: 1    lisinopril (PRINIVIL,ZESTRIL) 5 MG tablet, TAKE ONE TABLET BY MOUTH DAILY, Disp: 90 tablet, Rfl: 1    metoprolol succinate XL (TOPROL-XL) 200 MG 24 hr tablet, TAKE ONE TABLET BY MOUTH DAILY, Disp: 90 tablet, Rfl: 1    omega-3 acid ethyl esters (LOVAZA) 1 g capsule, TAKE ONE CAPSULE BY MOUTH TWICE DAILY, Disp: 180 capsule, Rfl: 3    potassium chloride 10 MEQ CR tablet, TAKE TWO TABLETS BY MOUTH DAILY, Disp: 180 tablet, Rfl: 1    tiotropium bromide-olodaterol (Stiolto Respimat) 2.5-2.5 MCG/ACT aerosol solution inhaler, Inhale 2 puffs Daily., Disp: 3 each, Rfl: 3    triamterene-hydrochlorothiazide (MAXZIDE-25) 37.5-25 MG per tablet, TAKE ONE TABLET BY MOUTH  "DAILY, Disp: 90 tablet, Rfl: 1    atorvastatin (LIPITOR) 20 MG tablet, Take 1 tablet by mouth Daily., Disp: 90 tablet, Rfl: 3    Medications Discontinued During This Encounter   Medication Reason    simvastatin (ZOCOR) 10 MG tablet     Eliquis 5 MG tablet tablet Reorder     No Known Allergies     Social History     Tobacco Use    Smoking status: Every Day     Current packs/day: 1.50     Average packs/day: 1.5 packs/day for 50.2 years (75.3 ttl pk-yrs)     Types: Cigarettes     Start date: 1974     Passive exposure: Current    Smokeless tobacco: Never    Tobacco comments:     1-1.5 PPD as of 7/5/2023 - [place]    Vaping Use    Vaping status: Never Used   Substance Use Topics    Alcohol use: Yes     Alcohol/week: 1.0 standard drink of alcohol     Types: 1 Cans of beer per week     Comment: daily    Drug use: Never       Family History   Problem Relation Age of Onset    Mental illness Mother     Heart disease Mother     Other Father         parkinson    Colon cancer Neg Hx         Objective     /88   Pulse 80   Ht 177.8 cm (70\")   Wt 90.3 kg (199 lb)   BMI 28.55 kg/m²       Physical Exam    General Appearance:   no acute distress  Alert and oriented x3  HENT:   lips not cyanotic  Atraumatic  Neck:  No jvd   supple  Respiratory:  no respiratory distress  X-ray wheezes  no rales  Cardiovascular:  Irregular irregular  no S3, no S4   no murmur  no rub  Extremities  No cyanosis  lower extremity edema  L  Skin:   warm, dry  No rashes      Result Review :     No results found for: \"PROBNP\"  CMP          8/30/2023    10:26 2/12/2024    09:28   CMP   Glucose 102     BUN 17     Creatinine 0.87  0.90    EGFR 94.6  93.6    Sodium 139     Potassium 3.8     Chloride 100     Calcium 9.3     Total Protein 7.0     Albumin 3.7     Total Bilirubin 0.6     Alkaline Phosphatase 110     AST (SGOT) 18     ALT (SGPT) 17     BUN/Creatinine Ratio 19.5     Anion Gap 11.0       CBC w/diff          9/28/2023    07:34   CBC w/Diff   WBC 8.72  " "  RBC 5.62    Hemoglobin 18.0    Hematocrit 52.3    MCV 93.1    MCH 32.0    MCHC 34.4    RDW 12.0    Platelets 245    Neutrophil Rel % 59.1    Immature Granulocyte Rel % 0.5    Lymphocyte Rel % 30.7    Monocyte Rel % 6.2    Eosinophil Rel % 2.4    Basophil Rel % 1.1       Lab Results   Component Value Date    TSH 1.830 09/29/2022      No results found for: \"FREET4\"   No results found for: \"DDIMERQUANT\"  No results found for: \"MG\"   No results found for: \"DIGOXIN\"   No results found for: \"TROPONINT\"        Lipid Panel          8/30/2023    10:26   Lipid Panel   Total Cholesterol 127    Triglycerides 74    HDL Cholesterol 41    VLDL Cholesterol 15    LDL Cholesterol  71    LDL/HDL Ratio 1.74      No results found for: \"POCTROP\"    Results for orders placed in visit on 03/21/22    Adult Transthoracic Echo Complete W/ Cont if Necessary Per Protocol    Interpretation Summary  · Calculated left ventricular EF = 63% Estimated left ventricular EF was in agreement with the calculated left ventricular EF.  · The right ventricular cavity is moderately dilated.  · The right atrial cavity is moderately dilated.  · D-shaped compression of the interventricular septum consistent with RV pressure volume overload                 Diagnoses and all orders for this visit:    1. Atrial fibrillation, chronic (Primary)  Assessment & Plan:  Patient is rate controlled on Eliquis 5 twice daily for CVA prevention    Orders:  -     apixaban (Eliquis) 5 MG tablet tablet; Take 1 tablet by mouth 2 (Two) Times a Day.  Dispense: 180 tablet; Refill: 3    2. Chronic heart failure with preserved ejection fraction  Assessment & Plan:  Patient stable symptomatically is in no evidence of increased volume overload on exam today continue on his triamterene-hydrochlorothiazide 37.5/25 mg daily      3. Hyperlipidemia LDL goal <70  Assessment & Plan:  Patient is mildly above goal recommended trying Lipitor 20 nightly instead of Zocor this also should have a " lower side effect profile repeat lipids and LFTs on next visit     Orders:  -     atorvastatin (LIPITOR) 20 MG tablet; Take 1 tablet by mouth Daily.  Dispense: 90 tablet; Refill: 3  -     Hepatic Function Panel; Future  -     Lipid Panel; Future    4. Essential hypertension            Follow Up     Return in about 6 months (around 9/18/2024).          Patient was given instructions and counseling regarding his condition or for health maintenance advice. Please see specific information pulled into the AVS if appropriate.

## 2024-03-28 ENCOUNTER — OFFICE VISIT (OUTPATIENT)
Dept: FAMILY MEDICINE CLINIC | Facility: CLINIC | Age: 69
End: 2024-03-28
Payer: MEDICARE

## 2024-03-28 ENCOUNTER — PATIENT ROUNDING (BHMG ONLY) (OUTPATIENT)
Dept: FAMILY MEDICINE CLINIC | Facility: CLINIC | Age: 69
End: 2024-03-28
Payer: MEDICARE

## 2024-03-28 VITALS
HEART RATE: 98 BPM | HEIGHT: 70 IN | BODY MASS INDEX: 28.35 KG/M2 | OXYGEN SATURATION: 95 % | WEIGHT: 198 LBS | RESPIRATION RATE: 16 BRPM | TEMPERATURE: 97.2 F | DIASTOLIC BLOOD PRESSURE: 70 MMHG | SYSTOLIC BLOOD PRESSURE: 96 MMHG

## 2024-03-28 DIAGNOSIS — I10 ESSENTIAL HYPERTENSION: ICD-10-CM

## 2024-03-28 DIAGNOSIS — I63.9 CEREBROVASCULAR ACCIDENT (CVA), UNSPECIFIED MECHANISM: Primary | ICD-10-CM

## 2024-03-28 DIAGNOSIS — Z51.81 ENCOUNTER FOR MEDICATION MONITORING: ICD-10-CM

## 2024-03-28 LAB
ALBUMIN SERPL-MCNC: 3.5 G/DL (ref 3.5–5.2)
ALBUMIN/GLOB SERPL: 1 G/DL
ALP SERPL-CCNC: 128 U/L (ref 39–117)
ALT SERPL W P-5'-P-CCNC: 22 U/L (ref 1–41)
ANION GAP SERPL CALCULATED.3IONS-SCNC: 11.5 MMOL/L (ref 5–15)
AST SERPL-CCNC: 21 U/L (ref 1–40)
BASOPHILS # BLD AUTO: 0.06 10*3/MM3 (ref 0–0.2)
BASOPHILS NFR BLD AUTO: 0.8 % (ref 0–1.5)
BILIRUB SERPL-MCNC: 0.7 MG/DL (ref 0–1.2)
BUN SERPL-MCNC: 15 MG/DL (ref 8–23)
BUN/CREAT SERPL: 10.7 (ref 7–25)
CALCIUM SPEC-SCNC: 9.1 MG/DL (ref 8.6–10.5)
CHLORIDE SERPL-SCNC: 99 MMOL/L (ref 98–107)
CHOLEST SERPL-MCNC: 111 MG/DL (ref 0–200)
CO2 SERPL-SCNC: 29.5 MMOL/L (ref 22–29)
CREAT SERPL-MCNC: 1.4 MG/DL (ref 0.76–1.27)
DEPRECATED RDW RBC AUTO: 42 FL (ref 37–54)
EGFRCR SERPLBLD CKD-EPI 2021: 54.7 ML/MIN/1.73
EOSINOPHIL # BLD AUTO: 0.14 10*3/MM3 (ref 0–0.4)
EOSINOPHIL NFR BLD AUTO: 1.9 % (ref 0.3–6.2)
ERYTHROCYTE [DISTWIDTH] IN BLOOD BY AUTOMATED COUNT: 12.2 % (ref 12.3–15.4)
GLOBULIN UR ELPH-MCNC: 3.6 GM/DL
GLUCOSE SERPL-MCNC: 112 MG/DL (ref 65–99)
HCT VFR BLD AUTO: 51.3 % (ref 37.5–51)
HDLC SERPL-MCNC: 42 MG/DL (ref 40–60)
HGB BLD-MCNC: 17.8 G/DL (ref 13–17.7)
IMM GRANULOCYTES # BLD AUTO: 0.02 10*3/MM3 (ref 0–0.05)
IMM GRANULOCYTES NFR BLD AUTO: 0.3 % (ref 0–0.5)
LDLC SERPL CALC-MCNC: 57 MG/DL (ref 0–100)
LDLC/HDLC SERPL: 1.4 {RATIO}
LYMPHOCYTES # BLD AUTO: 1.32 10*3/MM3 (ref 0.7–3.1)
LYMPHOCYTES NFR BLD AUTO: 18.1 % (ref 19.6–45.3)
MCH RBC QN AUTO: 32.4 PG (ref 26.6–33)
MCHC RBC AUTO-ENTMCNC: 34.7 G/DL (ref 31.5–35.7)
MCV RBC AUTO: 93.4 FL (ref 79–97)
MONOCYTES # BLD AUTO: 0.42 10*3/MM3 (ref 0.1–0.9)
MONOCYTES NFR BLD AUTO: 5.7 % (ref 5–12)
NEUTROPHILS NFR BLD AUTO: 5.35 10*3/MM3 (ref 1.7–7)
NEUTROPHILS NFR BLD AUTO: 73.2 % (ref 42.7–76)
NRBC BLD AUTO-RTO: 0 /100 WBC (ref 0–0.2)
PLATELET # BLD AUTO: 194 10*3/MM3 (ref 140–450)
PMV BLD AUTO: 10.8 FL (ref 6–12)
POTASSIUM SERPL-SCNC: 4.2 MMOL/L (ref 3.5–5.2)
PROT SERPL-MCNC: 7.1 G/DL (ref 6–8.5)
RBC # BLD AUTO: 5.49 10*6/MM3 (ref 4.14–5.8)
SODIUM SERPL-SCNC: 140 MMOL/L (ref 136–145)
TRIGL SERPL-MCNC: 52 MG/DL (ref 0–150)
TSH SERPL DL<=0.05 MIU/L-ACNC: 1.6 UIU/ML (ref 0.27–4.2)
VLDLC SERPL-MCNC: 12 MG/DL (ref 5–40)
WBC NRBC COR # BLD AUTO: 7.31 10*3/MM3 (ref 3.4–10.8)

## 2024-03-28 PROCEDURE — 80053 COMPREHEN METABOLIC PANEL: CPT | Performed by: NURSE PRACTITIONER

## 2024-03-28 PROCEDURE — 80177 DRUG SCRN QUAN LEVETIRACETAM: CPT | Performed by: NURSE PRACTITIONER

## 2024-03-28 PROCEDURE — 85025 COMPLETE CBC W/AUTO DIFF WBC: CPT | Performed by: NURSE PRACTITIONER

## 2024-03-28 PROCEDURE — 80061 LIPID PANEL: CPT | Performed by: NURSE PRACTITIONER

## 2024-03-28 PROCEDURE — 84443 ASSAY THYROID STIM HORMONE: CPT | Performed by: NURSE PRACTITIONER

## 2024-03-28 RX ORDER — LEVETIRACETAM 500 MG/1
1000 TABLET ORAL 2 TIMES DAILY
Qty: 360 TABLET | Refills: 1 | Status: SHIPPED | OUTPATIENT
Start: 2024-03-28

## 2024-03-28 NOTE — PROGRESS NOTES
Chief Complaint  Cerebrovascular Accident    Subjective          Sacha Arzola presents to [facility]  History of Present Illness  Here with daughter Amanda.  He said that back in 2009-he believes he was at a [employer] game and had a stroke.  He refused to go to the ER at that time.  His wife had him go to the hospital the next day at [facility].  We will obtain those records.  CVA/Seizures:  He does not drive anymore.  He goes to the eye doctor.  He went to Dr Brewer in March.  He has not had any seizures recently. He said that he is never really had a seizure.  He is not sure why he is on the medicine.  HTN/A fib/CHF:  He is followed by cardiology  Cigarettes/COPD:  He is followed by pulm.    KATHARINE:  it has been years.  He did wear one out but then he got a new one.  He goes to the sleep group.    Refuse to stop smoking.  Did cologuard--refuses colonoscopy  He can't drive due to macular deg.  I spoke with gastro and they were unable to get a good reading with the FibroScan so therefore they are not going to repeat it.  I did talk with his daughter Amanda and let her know that they will not be retesting.  Depression: Not at risk (10/5/2023)    PHQ-2     PHQ-2 Score: 0    and 10/5/2023               Allergies  Patient has no known allergies.    Social History     Tobacco Use    Smoking status: Every Day     Current packs/day: 1.50     Average packs/day: 1.5 packs/day for 50.2 years (75.4 ttl pk-yrs)     Types: Cigarettes     Start date: 1974     Passive exposure: Current    Smokeless tobacco: Never    Tobacco comments:     1-1.5 PPD as of 7/5/2023 - [place]    Vaping Use    Vaping status: Never Used   Substance Use Topics    Alcohol use: Yes     Alcohol/week: 1.0 standard drink of alcohol     Types: 1 Cans of beer per week     Comment: daily    Drug use: Never       Family History   Problem Relation Age of Onset    Mental illness Mother     Heart disease Mother     Other Father   "       parkinson    Colon cancer Neg Hx         Health Maintenance Due   Topic Date Due    COLORECTAL CANCER SCREENING  04/12/2024        Immunization History   Administered Date(s) Administered    COVID-19 ([place]) 06/16/2021    Flu Vaccine Quad PF >36MO 09/18/2018, 09/23/2020    Fluzone (or Fluarix & Flulaval for VFC) >6mos 09/18/2018, 09/23/2020, 09/29/2022    Fluzone High-Dose 65+yrs 10/11/2021, 09/28/2023    Influenza, Unspecified 09/29/2022    Pneumococcal Conjugate 20-Valent (PCV20) 09/29/2022    Pneumococcal Polysaccharide (PPSV23) 02/02/2017       Review of Systems   Constitutional:  Positive for fatigue.   Respiratory:  Negative for cough and shortness of breath.    Cardiovascular:  Negative for chest pain.   Gastrointestinal:  Negative for diarrhea, nausea and vomiting.        Objective       Vitals:    03/28/24 0720   BP: 96/70   Pulse: 98   Resp: 16   Temp: 97.2 °F (36.2 °C)   SpO2: 95%   Weight: 89.8 kg (198 lb)   Height: 177.8 cm (70\")       Body mass index is 28.41 kg/m².         Physical Exam  Vitals reviewed.   Constitutional:       Appearance: Normal appearance. He is well-developed.   Cardiovascular:      Rate and Rhythm: Normal rate and regular rhythm.      Heart sounds: Normal heart sounds. No murmur heard.  Pulmonary:      Effort: Pulmonary effort is normal.      Breath sounds: Normal breath sounds.   Neurological:      Mental Status: He is alert and oriented to person, place, and time.      Cranial Nerves: No cranial nerve deficit.      Motor: No weakness.   Psychiatric:         Mood and Affect: Mood and affect normal.               Result Review :     The following data was reviewed by: EVIN Strong on 03/28/2024:    Common Labs   Common labs          8/30/2023    10:26 9/28/2023    07:34 2/12/2024    09:28   Common Labs   Glucose 102      BUN 17      Creatinine 0.87   0.90    Sodium 139      Potassium 3.8      Chloride 100      Calcium 9.3      Albumin 3.7      Total " Bilirubin 0.6      Alkaline Phosphatase 110      AST (SGOT) 18      ALT (SGPT) 17      WBC  8.72     Hemoglobin  18.0     Hematocrit  52.3     Platelets  245     Total Cholesterol 127      Triglycerides 74      HDL Cholesterol 41      LDL Cholesterol  71      PSA  1.220             CT Chest With Contrast Diagnostic    Result Date: 2/12/2024    1. Stable bilateral pulmonary nodules.  Largest nodules remain within the left lower lobe and measure up to 8 mm in size.  There are areas of mucous plugging and no new peribronchial thickening within the left lower lobe favoring infectious or inflammatory process.  There is also stable airspace consolidation within the periphery of the left lower lobe.  Continued serial follow-up would be recommended with repeat CT scan of the chest in 6 months.     RAZ BATISTA MD       Electronically Signed and Approved By: RAZ BATISTA MD on 2/12/2024 at 11:19                         Assessment and Plan      Assessment & Plan  Cerebrovascular accident (CVA), unspecified mechanism    Essential hypertension  He is followed by cardio  Encounter for medication monitoring      Orders Placed This Encounter   Procedures    Comprehensive Metabolic Panel    TSH    Lipid Panel    Levetiracetam Level (Keppra)    CBC Auto Differential    CBC & Differential     New Medications Ordered This Visit   Medications    levETIRAcetam (KEPPRA) 500 MG tablet     Sig: Take 2 tablets by mouth 2 (Two) Times a Day.     Dispense:  360 tablet     Refill:  1          Diagnosis Plan   1. Cerebrovascular accident (CVA), unspecified mechanism  levETIRAcetam (KEPPRA) 500 MG tablet    Levetiracetam Level (Keppra)      2. Essential hypertension  Comprehensive Metabolic Panel    CBC & Differential    TSH    Lipid Panel      3. Encounter for medication monitoring  Levetiracetam Level (Keppra)                Follow Up     Return in about 6 months (around 9/28/2024) for Medicare Wellness.  Follow-up in 6 months for labs and  appt. Call with any concerns or questions that you may have regarding your medications or history.    I have reviewed all medications and at this time no medications changes need to be adjusted for all chronic conditions.    Patient was given instructions and counseling regarding his condition or for health maintenance advice. Please see specific information pulled into the AVS if appropriate.     Parts of this note are electronic transcriptions/translations of spoken language to printed text using the Dragon Dictation system.          Lee Ann Wilhelm, APRN  03/28/2024

## 2024-03-30 LAB — LEVETIRACETAM SERPL-MCNC: 44.2 UG/ML (ref 10–40)

## 2024-04-01 RX ORDER — ASPIRIN 81 MG/1
TABLET ORAL
Qty: 90 TABLET | Refills: 3 | Status: SHIPPED | OUTPATIENT
Start: 2024-04-01

## 2024-04-01 RX ORDER — OMEGA-3-ACID ETHYL ESTERS 1 G/1
CAPSULE, LIQUID FILLED ORAL
Qty: 180 CAPSULE | Refills: 3 | Status: SHIPPED | OUTPATIENT
Start: 2024-04-01

## 2024-07-09 ENCOUNTER — OFFICE VISIT (OUTPATIENT)
Dept: PULMONOLOGY | Facility: CLINIC | Age: 69
End: 2024-07-09
Payer: MEDICARE

## 2024-07-09 VITALS
HEIGHT: 70 IN | SYSTOLIC BLOOD PRESSURE: 101 MMHG | BODY MASS INDEX: 29.09 KG/M2 | HEART RATE: 108 BPM | DIASTOLIC BLOOD PRESSURE: 77 MMHG | TEMPERATURE: 97.6 F | OXYGEN SATURATION: 93 % | WEIGHT: 203.2 LBS | RESPIRATION RATE: 18 BRPM

## 2024-07-09 DIAGNOSIS — G47.33 OSA ON CPAP: Chronic | ICD-10-CM

## 2024-07-09 DIAGNOSIS — F17.210 NICOTINE DEPENDENCE, CIGARETTES, UNCOMPLICATED: ICD-10-CM

## 2024-07-09 DIAGNOSIS — R06.09 DYSPNEA ON EXERTION: ICD-10-CM

## 2024-07-09 DIAGNOSIS — J44.9 CHRONIC OBSTRUCTIVE PULMONARY DISEASE, UNSPECIFIED COPD TYPE: Primary | Chronic | ICD-10-CM

## 2024-07-09 PROCEDURE — 1160F RVW MEDS BY RX/DR IN RCRD: CPT | Performed by: NURSE PRACTITIONER

## 2024-07-09 PROCEDURE — 1159F MED LIST DOCD IN RCRD: CPT | Performed by: NURSE PRACTITIONER

## 2024-07-09 PROCEDURE — 3078F DIAST BP <80 MM HG: CPT | Performed by: NURSE PRACTITIONER

## 2024-07-09 PROCEDURE — 99213 OFFICE O/P EST LOW 20 MIN: CPT | Performed by: NURSE PRACTITIONER

## 2024-07-09 PROCEDURE — 3074F SYST BP LT 130 MM HG: CPT | Performed by: NURSE PRACTITIONER

## 2024-07-09 RX ORDER — SIMVASTATIN 10 MG
10 TABLET ORAL
COMMUNITY
Start: 2024-04-02

## 2024-07-09 NOTE — PATIENT INSTRUCTIONS
COPD and Physical Activity  Chronic obstructive pulmonary disease (COPD) is a long-term, or chronic, condition that affects the lungs. COPD is a general term that can be used to describe many problems that cause inflammation of the lungs and limit airflow. These conditions include chronic bronchitis and emphysema.  The main symptom of COPD is shortness of breath, which makes it harder to do even simple tasks. This can also make it harder to exercise and stay active. Talk with your health care provider about treatments to help you breathe better and actions you can take to prevent breathing problems during physical activity.  What are the benefits of exercising when you have COPD?  Exercising regularly is an important part of a healthy lifestyle. You can still exercise and do physical activities even though you have COPD. Exercise and physical activity improve your shortness of breath by increasing blood flow (circulation). This causes your heart to pump more oxygen through your body. Moderate exercise can:  Improve oxygen use.  Increase your energy level.  Help with shortness of breath.  Strengthen your breathing muscles.  Improve heart health.  Help with sleep.  Improve your self-esteem and feelings of self-worth.  Lower depression, stress, and anxiety.  Exercise can benefit everyone with COPD. The severity of your disease may affect how hard you can exercise, especially at first, but everyone can benefit. Talk with your health care provider about how much exercise is safe for you, and which activities and exercises are safe for you.  What actions can I take to prevent breathing problems during physical activity?  Sign up for a pulmonary rehabilitation program. This type of program may include:  Education about lung diseases.  Exercise classes that teach you how to exercise and be more active while improving your breathing. This usually involves:  Exercise using your lower extremities, such as a stationary  bicycle.  About 30 minutes of exercise, 2 to 5 times per week, for 6 to 12 weeks.  Strength training, such as push-ups or leg lifts.  Nutrition education.  Group classes in which you can talk with others who also have COPD and learn ways to manage stress.  If you use an oxygen tank, you should use it while you exercise. Work with your health care provider to adjust your oxygen for your physical activity. Your resting flow rate is different from your flow rate during physical activity.  How to manage your breathing while exercising  While you are exercising:  Take slow breaths.  Pace yourself, and do nottry to go too fast.  Purse your lips while breathing out. Pursing your lips is similar to a kissing or whistling position.  If doing exercise that uses a quick burst of effort, such as weight lifting:  Breathe in before starting the exercise.  Breathe out during the hardest part of the exercise, such as raising the weights.  Where to find support  You can find support for exercising with COPD from:  Your health care provider.  A pulmonary rehabilitation program.  Your local health department or community health programs.  Support groups, either online or in-person. Your health care provider may be able to recommend support groups.  Where to find more information  You can find more information about exercising with COPD from:  American Lung Association: lung.org  COPD Foundation: copdfoundation.org  Contact a health care provider if:  Your symptoms get worse.  You have nausea.  You have a fever.  You want to start a new exercise program or a new activity.  Get help right away if:  You have chest pain.  You cannot breathe.  These symptoms may represent a serious problem that is an emergency. Do not wait to see if the symptoms will go away. Get medical help right away. Call your local emergency services (911 in the [place] Do not drive yourself to the hospital.  Summary  COPD is a general term that can be used to describe  "many different lung problems that cause lung inflammation and limit airflow. This includes chronic bronchitis and emphysema.  Exercise and physical activity improve your shortness of breath by increasing blood flow (circulation). This causes your heart to provide more oxygen to your body.  Contact your health care provider before starting any exercise program or new activity. Ask your health care provider what exercises and activities are safe for you.  This information is not intended to replace advice given to you by your health care provider. Make sure you discuss any questions you have with your health care provider.  Document Revised: 10/26/2021 Document Reviewed: 10/26/2021  [employer] Patient Education © 2024 [employer]  Smoking Tobacco Information, Adult  Smoking tobacco can be harmful to your health. Tobacco contains a toxic colorless chemical called nicotine. Nicotine causes changes in your brain that make you want more and more. This is called addiction. This can make it hard to stop smoking once you start. Tobacco also has other toxic chemicals that can hurt your body and raise your risk of many cancers.  Menthol or \"lite\" tobacco or cigarette brands are not safer than regular brands.  How can smoking tobacco affect me?  Smoking tobacco puts you at risk for:  Cancer. Smoking is most commonly associated with lung cancer, but can also lead to cancer in other parts of the body.  Chronic obstructive pulmonary disease (COPD). This is a long-term lung condition that makes it hard to breathe. It also gets worse over time.  High blood pressure (hypertension), heart disease, stroke, heart attack, and lung infections, such as pneumonia.  Cataracts. This is when the lenses in the eyes become clouded.  Digestive problems. This may include peptic ulcers, heartburn, and gastroesophageal reflux disease (GERD).  Oral health problems, such as gum disease, mouth sores, and tooth loss.  Loss of taste and smell.  Smoking " also affects how you look and smell. Smoking may cause:  Wrinkles.  Yellow or stained teeth, fingers, and fingernails.  Bad breath.  Bad-smelling clothes and hair.  Smoking tobacco can also affect your social life, because:  It may be challenging to find places to smoke when away from home. Many workplaces, restaurants, hotels, and public places are tobacco-free.  Smoking is expensive. This is due to the cost of tobacco and the long-term costs of treating health problems from smoking.  Secondhand smoke may affect those around you. Secondhand smoke can cause lung cancer, breathing problems, and heart disease. Children of smokers have a higher risk for:  Sudden infant death syndrome (SIDS).  Ear infections.  Lung infections.  What actions can I take to prevent health problems?  Quit smoking    Do not start smoking. Quit if you already smoke.  Do not replace cigarette smoking with vaping devices, such as e-cigarettes.  Make a plan to quit smoking and commit to it. Look for programs to help you, and ask your health care provider for recommendations and ideas. Set a date and write down all the reasons you want to quit.  Let your friends and family know you are quitting so they can help and support you. Consider finding friends who also want to quit. It can be easier to quit with someone else, so that you can support each other.  Talk with your health care provider about using nicotine replacement medicines to help you quit. These include gum, lozenges, patches, sprays, or pills.  If you try to quit but return to smoking, stay positive. It is common to slip up when you first quit, so take it one day at a time.  Be prepared for cravings. When you feel the urge to smoke, chew gum or suck on hard candy.  Lifestyle  Stay busy.  Take care of your body. Get plenty of exercise, eat a healthy diet, and drink plenty of water.  Find ways to manage your stress, such as meditation, yoga, exercise, or time spent with friends and  family.  Ask your health care provider about having regular tests (screenings) to check for cancer. This may include blood tests, imaging tests, and other tests.  Where to find support  To get support to quit smoking, consider:  Asking your health care provider for more information and resources.  Joining a support group for people who want to quit smoking in your local community. There are many effective programs that may help you to quit.  Calling the smokefree.gov counselor helpline at 9-874-QUIT-NOW (1-951.105.4989).  Where to find more information  You may find more information about quitting smoking from:  Centers for Disease Control and Prevention: cdc.gov/tobacco  Smokefree.gov: smokefree.gov  American Lung Association: freedomfromsmoking.org  Contact a health care provider if:  You have problems breathing.  Your lips, nose, or fingers turn blue.  You have chest pain.  You are coughing up blood.  You feel like you will faint.  You have other health changes that cause you to worry.  Summary  Smoking tobacco can negatively affect your health, the health of those around you, your finances, and your social life.  Do not start smoking. Quit if you already smoke. If you need help quitting, ask your health care provider.  Consider joining a support group for people in your local community who want to quit smoking. There are many effective programs that may help you to quit.  This information is not intended to replace advice given to you by your health care provider. Make sure you discuss any questions you have with your health care provider.  Document Revised: 12/13/2022 Document Reviewed: 12/13/2022  [employer] Patient Education © 2024 [employer]  Sleep Apnea  Sleep apnea is a condition in which breathing pauses or becomes shallow during sleep. People with sleep apnea usually snore loudly. They may have times when they gasp and stop breathing for 10 seconds or more during sleep. This may happen many times during  the night.  Sleep apnea disrupts your sleep and keeps your body from getting the rest that it needs. This condition can increase your risk of certain health problems, including:  Heart attack.  Stroke.  Obesity.  Type 2 diabetes.  Heart failure.  Irregular heartbeat.  High blood pressure.  The goal of treatment is to help you breathe normally again.  What are the causes?    The most common cause of sleep apnea is a collapsed or blocked airway.  There are three kinds of sleep apnea:  Obstructive sleep apnea. This kind is caused by a blocked or collapsed airway.  Central sleep apnea. This kind happens when the part of the brain that controls breathing does not send the correct signals to the muscles that control breathing.  Mixed sleep apnea. This is a combination of obstructive and central sleep apnea.  What increases the risk?  You are more likely to develop this condition if you:  Are overweight.  Smoke.  Have a smaller than normal airway.  Are older.  Are male.  Drink alcohol.  Take sedatives or tranquilizers.  Have a family history of sleep apnea.  Have a tongue or tonsils that are larger than normal.  What are the signs or symptoms?  Symptoms of this condition include:  Trouble staying asleep.  Loud snoring.  Morning headaches.  Waking up gasping.  Dry mouth or sore throat in the morning.  Daytime sleepiness and tiredness.  If you have daytime fatigue because of sleep apnea, you may be more likely to have:  Trouble concentrating.  Forgetfulness.  Irritability or mood swings.  Personality changes.  Feelings of depression.  Sexual dysfunction. This may include loss of interest if you are female, or erectile dysfunction if you are male.  How is this diagnosed?  This condition may be diagnosed with:  A medical history.  A physical exam.  A series of tests that are done while you are sleeping (sleep study). These tests are usually done in a sleep lab, but they may also be done at home.  How is this  treated?  Treatment for this condition aims to restore normal breathing and to ease symptoms during sleep. It may involve managing health issues that can affect breathing, such as high blood pressure or obesity. Treatment may include:  Sleeping on your side.  Using a decongestant if you have nasal congestion.  Avoiding the use of depressants, including alcohol, sedatives, and narcotics.  Losing weight if you are overweight.  Making changes to your diet.  Quitting smoking.  Using a device to open your airway while you sleep, such as:  An oral appliance. This is a custom-made mouthpiece that shifts your lower jaw forward.  A continuous positive airway pressure (CPAP) device. This device blows air through a mask when you breathe out (exhale).  A nasal expiratory positive airway pressure (EPAP) device. This device has valves that you put into each nostril.  A bi-level positive airway pressure (BIPAP) device. This device blows air through a mask when you breathe in (inhale) and breathe out (exhale).  Having surgery if other treatments do not work. During surgery, excess tissue is removed to create a wider airway.  Follow these instructions at home:  Lifestyle  Make any lifestyle changes that your health care provider recommends.  Eat a healthy, well-balanced diet.  Take steps to lose weight if you are overweight.  Avoid using depressants, including alcohol, sedatives, and narcotics.  Do not use any products that contain nicotine or tobacco. These products include cigarettes, chewing tobacco, and vaping devices, such as e-cigarettes. If you need help quitting, ask your health care provider.  General instructions  Take over-the-counter and prescription medicines only as told by your health care provider.  If you were given a device to open your airway while you sleep, use it only as told by your health care provider.  If you are having surgery, make sure to tell your health care provider you have sleep apnea. You may need  to bring your device with you.  Keep all follow-up visits. This is important.  Contact a health care provider if:  The device that you received to open your airway during sleep is uncomfortable or does not seem to be working.  Your symptoms do not improve.  Your symptoms get worse.  Get help right away if:  You develop:  Chest pain.  Shortness of breath.  Discomfort in your back, arms, or stomach.  You have:  Trouble speaking.  Weakness on one side of your body.  Drooping in your face.  These symptoms may represent a serious problem that is an emergency. Do not wait to see if the symptoms will go away. Get medical help right away. Call your local emergency services (911 in the [place] Do not drive yourself to the hospital.  Summary  Sleep apnea is a condition in which breathing pauses or becomes shallow during sleep.  The most common cause is a collapsed or blocked airway.  The goal of treatment is to restore normal breathing and to ease symptoms during sleep.  This information is not intended to replace advice given to you by your health care provider. Make sure you discuss any questions you have with your health care provider.  Document Revised: 07/27/2022 Document Reviewed: 11/26/2021  [employer]vier Patient Education © 2024 [employer]

## 2024-07-09 NOTE — PROGRESS NOTES
Primary Care Provider  Lee Ann Wilhelm APRN     Referring Provider  No ref. provider found     Chief Complaint  COPD, Shortness of Breath (With exertion ), Wheezing, and Follow-up (6 month f/up )    Subjective          Sacha Arzola presents to [facility]  History of Present Illness  Sacha Arzola is a 69 y.o. male patient of Dr. Mccormick here for management of COPD, dyspnea on exertion, KATHARINE on CPAP and tobacco use of cigarettes ongoing.     Patient states he is doing well since his last office visit.  He denies using any antibiotics or steroids for his lungs.  He denies any current fevers or chills.  His shortness of breath is mild in severity, worse with exertion and improved with rest.  He states that his shortness of breath is worse while walking inclines and with increased heat.  He continues to use Stiolto as prescribed.  He continues to use his albuterol once daily.  He continues to smoke 1.5 packs of cigarettes daily and is not interested in quitting at this time.  He continues to follow-up with sleep medicine for management of sleep apnea.  Overall, he states he is doing well and has no additional concerns at this time.  He is able to perform his ADLs without difficulty.  He is up-to-date with his COVID, flu and pneumonia vaccines.     His history of smoking is   Tobacco Use: High Risk (7/9/2024)    Patient History     Smoking Tobacco Use: Every Day     Smokeless Tobacco Use: Never     Passive Exposure: Current   Sacha Arzola  reports that he has been smoking cigarettes. He started smoking about 50 years ago. He has a 75.8 pack-year smoking history. He has been exposed to tobacco smoke. He has never used smokeless tobacco. I have educated him on the risk of diseases from using tobacco products such as cancer, COPD, and heart disease.     I advised him to quit and he is not willing to quit.    I spent 3  minutes counseling the  patient.        Review of Systems   Constitutional:  Negative for chills, fatigue, fever, unexpected weight gain and unexpected weight loss.   HENT:  Congestion: Nasal.    Respiratory:  Positive for shortness of breath. Negative for apnea, cough and wheezing.         Negative for Hemoptysis     Cardiovascular:  Negative for chest pain, palpitations and leg swelling.   Skin:         Negative for cyanosis      Sleep: Negative for Excessive daytime sleepiness  Negative for morning headaches  Negative for Snoring    Family History   Problem Relation Age of Onset    Mental illness Mother     Heart disease Mother     Other Father         parkinson    Colon cancer Neg Hx         Social History     Socioeconomic History    Marital status:    Tobacco Use    Smoking status: Every Day     Current packs/day: 1.50     Average packs/day: 1.5 packs/day for 50.5 years (75.8 ttl pk-yrs)     Types: Cigarettes     Start date: 1974     Passive exposure: Current    Smokeless tobacco: Never    Tobacco comments:     1-1.5 PPD as of 7/5/2023 - [place]    Vaping Use    Vaping status: Never Used   Substance and Sexual Activity    Alcohol use: Yes     Alcohol/week: 1.0 standard drink of alcohol     Types: 1 Cans of beer per week     Comment: daily    Drug use: Never    Sexual activity: Defer        Past Medical History:   Diagnosis Date    Atrial fibrillation, chronic 12/21/2021    Chronic heart failure with preserved ejection fraction 12/05/2019    COPD (chronic obstructive pulmonary disease) 12/05/2019    CVA (cerebral vascular accident) 2010    Essential hypertension 12/05/2019    Fatigue 12/05/2019    Peripheral vision loss 12/05/2019    Positive colorectal cancer screening using Cologuard test     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    Seizures         Immunization History   Administered Date(s) Administered    COVID-19 (ARMANDO) 06/16/2021    Flu Vaccine Quad PF >36MO 09/18/2018, 09/23/2020    Fluzone (or Fluarix & Flulaval for VFC)  >6mos 09/18/2018, 09/23/2020, 09/29/2022    Fluzone High-Dose 65+yrs 10/11/2021, 09/28/2023    Influenza, Unspecified 09/29/2022    Pneumococcal Conjugate 20-Valent (PCV20) 09/29/2022    Pneumococcal Polysaccharide (PPSV23) 02/02/2017         No Known Allergies       Current Outpatient Medications:     albuterol sulfate  (90 Base) MCG/ACT inhaler, Inhale 2 puffs Every 4 (Four) Hours As Needed for Wheezing., Disp: 54 g, Rfl: 3    apixaban (Eliquis) 5 MG tablet tablet, Take 1 tablet by mouth 2 (Two) Times a Day., Disp: 180 tablet, Rfl: 3    Aspirin Adult Low Strength 81 MG EC tablet, TAKE ONE TABLET BY MOUTH ONCE DAILY, Disp: 90 tablet, Rfl: 3    atorvastatin (LIPITOR) 20 MG tablet, Take 1 tablet by mouth Daily., Disp: 90 tablet, Rfl: 3    levETIRAcetam (KEPPRA) 500 MG tablet, Take 2 tablets by mouth 2 (Two) Times a Day., Disp: 360 tablet, Rfl: 1    lisinopril (PRINIVIL,ZESTRIL) 5 MG tablet, TAKE ONE TABLET BY MOUTH DAILY, Disp: 90 tablet, Rfl: 1    metoprolol succinate XL (TOPROL-XL) 200 MG 24 hr tablet, TAKE ONE TABLET BY MOUTH DAILY, Disp: 90 tablet, Rfl: 1    omega-3 acid ethyl esters (LOVAZA) 1 g capsule, TAKE ONE CAPSULE BY MOUTH TWICE DAILY, Disp: 180 capsule, Rfl: 3    potassium chloride 10 MEQ CR tablet, TAKE TWO TABLETS BY MOUTH DAILY, Disp: 180 tablet, Rfl: 1    simvastatin (ZOCOR) 10 MG tablet, Take 1 tablet by mouth Daily With Breakfast., Disp: , Rfl:     tiotropium bromide-olodaterol (Stiolto Respimat) 2.5-2.5 MCG/ACT aerosol solution inhaler, Inhale 2 puffs Daily., Disp: 3 each, Rfl: 3    triamterene-hydrochlorothiazide (MAXZIDE-25) 37.5-25 MG per tablet, TAKE ONE TABLET BY MOUTH DAILY, Disp: 90 tablet, Rfl: 1     Objective   Physical Exam  Constitutional:       General: He is not in acute distress.     Appearance: Normal appearance. He is normal weight.   HENT:      Right Ear: Hearing normal.      Left Ear: Hearing normal.      Nose: No nasal tenderness or congestion.      Mouth/Throat:       "Mouth: Mucous membranes are moist. No oral lesions.   Eyes:      Extraocular Movements: Extraocular movements intact.      Pupils: Pupils are equal, round, and reactive to light.   Cardiovascular:      Rate and Rhythm: Normal rate and regular rhythm.      Pulses: Normal pulses.      Heart sounds: Normal heart sounds. No murmur heard.  Pulmonary:      Effort: Pulmonary effort is normal.      Breath sounds: Decreased breath sounds present. No wheezing, rhonchi or rales.   Musculoskeletal:      Right lower leg: No edema.      Left lower leg: No edema.   Skin:     General: Skin is warm and dry.      Findings: No lesion or rash.   Neurological:      General: No focal deficit present.      Mental Status: He is alert and oriented to person, place, and time.   Psychiatric:         Mood and Affect: Affect normal. Mood is not anxious or depressed.         Vital Signs:   /77 (BP Location: Left arm, Patient Position: Sitting, Cuff Size: Large Adult)   Pulse 108   Temp 97.6 °F (36.4 °C) (Oral)   Resp 18   Ht 177.8 cm (70\")   Wt 92.2 kg (203 lb 3.2 oz)   SpO2 93% Comment: RA  BMI 29.16 kg/m²        Result Review :   The following data was reviewed by: EVIN Davenport on 07/09/2024:  CMP          8/30/2023    10:26 2/12/2024    09:28 3/28/2024    07:57   CMP   Glucose 102   112    BUN 17   15    Creatinine 0.87  0.90  1.40    EGFR 94.6  93.6  54.7    Sodium 139   140    Potassium 3.8   4.2    Chloride 100   99    Calcium 9.3   9.1    Total Protein 7.0   7.1    Albumin 3.7   3.5    Globulin   3.6    Total Bilirubin 0.6   0.7    Alkaline Phosphatase 110   128    AST (SGOT) 18   21    ALT (SGPT) 17   22    Albumin/Globulin Ratio   1.0    BUN/Creatinine Ratio 19.5   10.7    Anion Gap 11.0   11.5      CBC w/diff          9/28/2023    07:34 3/28/2024    07:57   CBC w/Diff   WBC 8.72  7.31    RBC 5.62  5.49    Hemoglobin 18.0  17.8    Hematocrit 52.3  51.3    MCV 93.1  93.4    MCH 32.0  32.4    MCHC 34.4  34.7    RDW " 12.0  12.2    Platelets 245  194    Neutrophil Rel % 59.1  73.2    Immature Granulocyte Rel % 0.5  0.3    Lymphocyte Rel % 30.7  18.1    Monocyte Rel % 6.2  5.7    Eosinophil Rel % 2.4  1.9    Basophil Rel % 1.1  0.8      Data reviewed : Radiologic studies chest CT 10/25/2023, PET scan 11/6/2023, chest CT 2/12/2024 and my last office note    Procedures        Assessment and Plan    Diagnoses and all orders for this visit:    1. Chronic obstructive pulmonary disease, unspecified COPD type (Primary)  Comments:  continue Stiolto    2. Dyspnea on exertion  Comments:  continue albuterol    3. KATHARINE on CPAP  Comments:  follow up with sleep medicine as scheduled    4. Nicotine dependence, cigarettes, uncomplicated  Comments:  smoking cessation education.  patient not wanting to quit    Smoking cessation counseling provided.  I spent 3 minutes today counseling patient on the risks of smoking, including throat cancer, lung cancer, COPD, heart disease and death.  Also discussed the benefits of quitting.         Follow Up   Return in about 6 months (around 1/9/2025) for Recheck with Nydia.  Patient was given instructions and counseling regarding his condition or for health maintenance advice. Please see specific information pulled into the AVS if appropriate.

## 2024-08-27 ENCOUNTER — HOSPITAL ENCOUNTER (OUTPATIENT)
Dept: CT IMAGING | Facility: HOSPITAL | Age: 69
Discharge: HOME | End: 2024-08-27
Admitting: NURSE PRACTITIONER
Payer: MEDICARE

## 2024-08-27 DIAGNOSIS — R93.89 ABNORMAL CT OF THE CHEST: ICD-10-CM

## 2024-08-27 PROCEDURE — 71250 CT THORAX DX C-: CPT

## 2024-08-29 DIAGNOSIS — I10 ESSENTIAL HYPERTENSION: ICD-10-CM

## 2024-08-29 DIAGNOSIS — I50.32 CHRONIC HEART FAILURE WITH PRESERVED EJECTION FRACTION (HFPEF): ICD-10-CM

## 2024-08-29 RX ORDER — POTASSIUM CHLORIDE 750 MG/1
20 TABLET, EXTENDED RELEASE ORAL DAILY
Qty: 180 TABLET | Refills: 3 | Status: SHIPPED | OUTPATIENT
Start: 2024-08-29

## 2024-08-29 RX ORDER — METOPROLOL SUCCINATE 200 MG/1
200 TABLET, EXTENDED RELEASE ORAL DAILY
Qty: 90 TABLET | Refills: 3 | Status: SHIPPED | OUTPATIENT
Start: 2024-08-29

## 2024-08-29 RX ORDER — LISINOPRIL 5 MG/1
5 TABLET ORAL DAILY
Qty: 90 TABLET | Refills: 3 | Status: SHIPPED | OUTPATIENT
Start: 2024-08-29

## 2024-08-29 RX ORDER — TRIAMTERENE/HYDROCHLOROTHIAZID 37.5-25 MG
1 TABLET ORAL DAILY
Qty: 90 TABLET | Refills: 3 | Status: SHIPPED | OUTPATIENT
Start: 2024-08-29

## 2024-08-31 DIAGNOSIS — R93.89 ABNORMAL CT OF THE CHEST: Primary | ICD-10-CM

## 2024-08-31 DIAGNOSIS — F17.219 CIGARETTE NICOTINE DEPENDENCE WITH NICOTINE-INDUCED DISORDER: ICD-10-CM

## 2024-09-18 ENCOUNTER — OFFICE VISIT (OUTPATIENT)
Dept: CARDIOLOGY | Facility: CLINIC | Age: 69
End: 2024-09-18
Payer: MEDICARE

## 2024-09-18 VITALS
WEIGHT: 218.6 LBS | HEART RATE: 88 BPM | DIASTOLIC BLOOD PRESSURE: 62 MMHG | SYSTOLIC BLOOD PRESSURE: 95 MMHG | HEIGHT: 70 IN | BODY MASS INDEX: 31.3 KG/M2

## 2024-09-18 DIAGNOSIS — I50.32 CHRONIC HEART FAILURE WITH PRESERVED EJECTION FRACTION (HFPEF): ICD-10-CM

## 2024-09-18 DIAGNOSIS — E78.5 HYPERLIPIDEMIA LDL GOAL <70: ICD-10-CM

## 2024-09-18 DIAGNOSIS — I10 ESSENTIAL HYPERTENSION: ICD-10-CM

## 2024-09-18 DIAGNOSIS — I48.20 ATRIAL FIBRILLATION, CHRONIC: Primary | ICD-10-CM

## 2024-09-18 PROCEDURE — 99214 OFFICE O/P EST MOD 30 MIN: CPT | Performed by: NURSE PRACTITIONER

## 2024-09-18 PROCEDURE — 3074F SYST BP LT 130 MM HG: CPT | Performed by: NURSE PRACTITIONER

## 2024-09-18 PROCEDURE — 3078F DIAST BP <80 MM HG: CPT | Performed by: NURSE PRACTITIONER

## 2024-09-18 RX ORDER — FUROSEMIDE 20 MG
TABLET ORAL
Qty: 90 TABLET | Refills: 3 | Status: SHIPPED | OUTPATIENT
Start: 2024-09-18

## 2024-09-18 NOTE — PROGRESS NOTES
Chief Complaint  Follow-up, Atrial Fibrillation, Hypertension, and Hyperlipidemia    Subjective            History of Present Illness  Sacha Arzola is a 69-year-old male patient who presents to the office today for follow up. He has atrial fibrillation, CHF, hypertension, and hyperlipidemia. He is compliant with medications. He denies any new or worsening cardiac symptoms today.    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 No pertinent surgical history.       SOC  Social History     Socioeconomic History    Marital status:    Tobacco Use    Smoking status: Every Day     Current packs/day: 1.50     Average packs/day: 1.5 packs/day for 50.7 years (76.1 ttl pk-yrs)     Types: Cigarettes     Start date: 1974     Passive exposure: Current    Smokeless tobacco: Never    Tobacco comments:     1-1.5 PPD as of 7/5/2023 - [place]    Vaping Use    Vaping status: Never Used   Substance and Sexual Activity    Alcohol use: Yes     Alcohol/week: 1.0 standard drink of alcohol     Types: 1 Cans of beer per week     Comment: daily    Drug use: Never    Sexual activity: Defer         FAMHX  Family History   Problem Relation Age of Onset    Mental illness Mother     Heart disease Mother     Other Father         parkinson    Colon cancer Neg Hx           MEDSIGONLY  Current Outpatient Medications on File Prior to Visit   Medication Sig    albuterol sulfate  (90 Base) MCG/ACT inhaler Inhale 2 puffs Every 4 (Four) Hours As Needed for  "Wheezing.    apixaban (Eliquis) 5 MG tablet tablet Take 1 tablet by mouth 2 (Two) Times a Day.    Aspirin Adult Low Strength 81 MG EC tablet TAKE ONE TABLET BY MOUTH ONCE DAILY    atorvastatin (LIPITOR) 20 MG tablet Take 1 tablet by mouth Daily.    levETIRAcetam (KEPPRA) 500 MG tablet Take 2 tablets by mouth 2 (Two) Times a Day.    lisinopril (PRINIVIL,ZESTRIL) 5 MG tablet TAKE ONE TABLET BY MOUTH DAILY    metoprolol succinate XL (TOPROL-XL) 200 MG 24 hr tablet TAKE ONE TABLET BY MOUTH DAILY    omega-3 acid ethyl esters (LOVAZA) 1 g capsule TAKE ONE CAPSULE BY MOUTH TWICE DAILY    potassium chloride 10 MEQ CR tablet TAKE TWO TABLETS BY MOUTH DAILY    tiotropium bromide-olodaterol (Stiolto Respimat) 2.5-2.5 MCG/ACT aerosol solution inhaler Inhale 2 puffs Daily.    [DISCONTINUED] triamterene-hydrochlorothiazide (MAXZIDE-25) 37.5-25 MG per tablet TAKE ONE TABLET BY MOUTH DAILY    [DISCONTINUED] simvastatin (ZOCOR) 10 MG tablet Take 1 tablet by mouth Daily With Breakfast. (Patient not taking: Reported on 2024)     No current facility-administered medications on file prior to visit.         Objective   BP 95/62   Pulse 88   Ht 177.8 cm (70\")   Wt 99.2 kg (218 lb 9.6 oz)   BMI 31.37 kg/m²       Physical Exam  HENT:      Head: Normocephalic.   Neck:      Vascular: No carotid bruit.   Cardiovascular:      Rate and Rhythm: Normal rate and regular rhythm.      Pulses: Normal pulses.      Heart sounds: Normal heart sounds. No murmur heard.  Pulmonary:      Effort: Pulmonary effort is normal.      Breath sounds: Normal breath sounds.   Musculoskeletal:      Cervical back: Neck supple.      Right lower le+ Pitting Edema present.      Left lower le+ Pitting Edema present.   Skin:     General: Skin is dry.   Neurological:      Mental Status: He is alert and oriented to person, place, and time.   Psychiatric:         Behavior: Behavior normal.       Result Review :   The following data was reviewed by: Lilly DUNCAN" "EVIN Jefferson on 09/18/2024:  No results found for: \"PROBNP\"  CMP          3/28/2024    07:57   CMP   Glucose 112    BUN 15    Creatinine 1.40    EGFR 54.7    Sodium 140    Potassium 4.2    Chloride 99    Calcium 9.1    Total Protein 7.1    Albumin 3.5    Globulin 3.6    Total Bilirubin 0.7    Alkaline Phosphatase 128    AST (SGOT) 21    ALT (SGPT) 22    Albumin/Globulin Ratio 1.0    BUN/Creatinine Ratio 10.7    Anion Gap 11.5      CBC w/diff          3/28/2024    07:57   CBC w/Diff   WBC 7.31    RBC 5.49    Hemoglobin 17.8    Hematocrit 51.3    MCV 93.4    MCH 32.4    MCHC 34.7    RDW 12.2    Platelets 194    Neutrophil Rel % 73.2    Immature Granulocyte Rel % 0.3    Lymphocyte Rel % 18.1    Monocyte Rel % 5.7    Eosinophil Rel % 1.9    Basophil Rel % 0.8       Lab Results   Component Value Date    TSH 1.600 03/28/2024      No results found for: \"FREET4\"   No results found for: \"DDIMERQUANT\"  No results found for: \"MG\"   No results found for: \"DIGOXIN\"   No results found for: \"TROPONINT\"        Lipid Panel          3/28/2024    07:57   Lipid Panel   Total Cholesterol 111    Triglycerides 52    HDL Cholesterol 42    VLDL Cholesterol 12    LDL Cholesterol  57    LDL/HDL Ratio 1.40        Results for orders placed in visit on 03/21/22    Adult Transthoracic Echo Complete W/ Cont if Necessary Per Protocol    Interpretation Summary  · Calculated left ventricular EF = 63% Estimated left ventricular EF was in agreement with the calculated left ventricular EF.  · The right ventricular cavity is moderately dilated.  · The right atrial cavity is moderately dilated.  · D-shaped compression of the interventricular septum consistent with RV pressure volume overload      [id number] Score: 5          Assessment and Plan    Diagnoses and all orders for this visit:    1. Atrial fibrillation, chronic (Primary)  Symptomatically stable at this time, rate controlled, continue metoprolol 200 mg daily.  Continue Eliquis for CVA " prevention.    2. Chronic heart failure with preserved ejection fraction  He has evidence of fluid overload on exam today.  We discussed fluid and sodium restriction, compression socks, and daily weight.  Start Lasix 40 mg daily for 5 days and then 20 mg daily thereafter.  In 2 weeks to check BMP and BMP to assess renal function, electrolytes, and CHF.  He already taking potassium chloride 20 mill equivalent daily.  -     Basic Metabolic Panel; Future  -     proBNP; Future    3. Essential hypertension  Currently controlled and without adverse effect from medication, continue lisinopril 5 mg daily.    4. Hyperlipidemia LDL goal <70  Last lipid panel was 3/28/2024 with LDL 57 which is within goal range, continue atorvastatin 20 mg daily.    Other orders  -     furosemide (LASIX) 20 MG tablet; Take 2 tablets by mouth daily for 5 days and then 1 tablet by mouth daily  Dispense: 90 tablet; Refill: 3            Follow Up   Return in about 6 months (around 3/18/2025) for Follow up with Dr Ch.    Patient was given instructions and counseling regarding his condition or for health maintenance advice. Please see specific information pulled into the AVS if appropriate.              Lilly Jefferson, EVIN  09/18/24  08:42 EDT    Dictated Utilizing Dragon Dictation

## 2024-09-19 ENCOUNTER — OFFICE VISIT (OUTPATIENT)
Dept: SLEEP MEDICINE | Facility: HOSPITAL | Age: 69
End: 2024-09-19
Payer: MEDICARE

## 2024-09-19 VITALS — OXYGEN SATURATION: 92 % | BODY MASS INDEX: 31.45 KG/M2 | WEIGHT: 219.7 LBS | HEART RATE: 90 BPM | HEIGHT: 70 IN

## 2024-09-19 DIAGNOSIS — J44.9 CHRONIC OBSTRUCTIVE PULMONARY DISEASE, UNSPECIFIED COPD TYPE: ICD-10-CM

## 2024-09-19 DIAGNOSIS — G47.33 OBSTRUCTIVE SLEEP APNEA: Primary | ICD-10-CM

## 2024-09-19 DIAGNOSIS — E66.9 OBESITY (BMI 30-39.9): ICD-10-CM

## 2024-09-19 PROCEDURE — 1159F MED LIST DOCD IN RCRD: CPT | Performed by: FAMILY MEDICINE

## 2024-09-19 PROCEDURE — 1160F RVW MEDS BY RX/DR IN RCRD: CPT | Performed by: FAMILY MEDICINE

## 2024-09-19 PROCEDURE — 99213 OFFICE O/P EST LOW 20 MIN: CPT | Performed by: FAMILY MEDICINE

## 2024-09-19 PROCEDURE — G0463 HOSPITAL OUTPT CLINIC VISIT: HCPCS

## 2024-09-26 ENCOUNTER — OFFICE VISIT (OUTPATIENT)
Dept: FAMILY MEDICINE CLINIC | Facility: CLINIC | Age: 69
End: 2024-09-26
Payer: MEDICARE

## 2024-09-26 VITALS
HEIGHT: 70 IN | DIASTOLIC BLOOD PRESSURE: 71 MMHG | BODY MASS INDEX: 29.99 KG/M2 | HEART RATE: 80 BPM | RESPIRATION RATE: 18 BRPM | SYSTOLIC BLOOD PRESSURE: 105 MMHG | OXYGEN SATURATION: 96 % | TEMPERATURE: 96.9 F | WEIGHT: 209.5 LBS

## 2024-09-26 DIAGNOSIS — E78.5 HYPERLIPIDEMIA LDL GOAL <70: ICD-10-CM

## 2024-09-26 DIAGNOSIS — I63.9 CEREBROVASCULAR ACCIDENT (CVA), UNSPECIFIED MECHANISM: ICD-10-CM

## 2024-09-26 DIAGNOSIS — Z23 NEEDS FLU SHOT: ICD-10-CM

## 2024-09-26 DIAGNOSIS — I10 ESSENTIAL HYPERTENSION: ICD-10-CM

## 2024-09-26 DIAGNOSIS — F17.219 CIGARETTE NICOTINE DEPENDENCE WITH NICOTINE-INDUCED DISORDER: ICD-10-CM

## 2024-09-26 DIAGNOSIS — L97.909 VENOUS ULCER: ICD-10-CM

## 2024-09-26 DIAGNOSIS — Z00.00 MEDICARE ANNUAL WELLNESS VISIT, SUBSEQUENT: Primary | ICD-10-CM

## 2024-09-26 DIAGNOSIS — I83.009 VENOUS ULCER: ICD-10-CM

## 2024-09-26 DIAGNOSIS — I48.20 ATRIAL FIBRILLATION, CHRONIC: ICD-10-CM

## 2024-09-26 RX ORDER — SILVER SULFADIAZINE 10 MG/G
1 CREAM TOPICAL 2 TIMES DAILY
Qty: 50 G | Refills: 0 | Status: SHIPPED | OUTPATIENT
Start: 2024-09-26

## 2024-09-26 RX ORDER — LEVETIRACETAM 500 MG/1
1000 TABLET ORAL 2 TIMES DAILY
Qty: 360 TABLET | Refills: 1 | Status: SHIPPED | OUTPATIENT
Start: 2024-09-26

## 2024-10-03 ENCOUNTER — LAB (OUTPATIENT)
Dept: LAB | Facility: HOSPITAL | Age: 69
End: 2024-10-03
Payer: MEDICARE

## 2024-10-03 DIAGNOSIS — I10 ESSENTIAL HYPERTENSION: ICD-10-CM

## 2024-10-03 DIAGNOSIS — I50.32 CHRONIC HEART FAILURE WITH PRESERVED EJECTION FRACTION (HFPEF): ICD-10-CM

## 2024-10-03 DIAGNOSIS — E78.5 HYPERLIPIDEMIA LDL GOAL <70: ICD-10-CM

## 2024-10-03 LAB
ALBUMIN SERPL-MCNC: 4 G/DL (ref 3.5–5.2)
ALBUMIN/GLOB SERPL: 1.1 G/DL
ALP SERPL-CCNC: 145 U/L (ref 39–117)
ALT SERPL W P-5'-P-CCNC: 18 U/L (ref 1–41)
ANION GAP SERPL CALCULATED.3IONS-SCNC: 14.8 MMOL/L (ref 5–15)
AST SERPL-CCNC: 24 U/L (ref 1–40)
BASOPHILS # BLD AUTO: 0.05 10*3/MM3 (ref 0–0.2)
BASOPHILS NFR BLD AUTO: 0.7 % (ref 0–1.5)
BILIRUB SERPL-MCNC: 1.8 MG/DL (ref 0–1.2)
BUN SERPL-MCNC: 21 MG/DL (ref 8–23)
BUN/CREAT SERPL: 15 (ref 7–25)
CALCIUM SPEC-SCNC: 9.4 MG/DL (ref 8.6–10.5)
CHLORIDE SERPL-SCNC: 87 MMOL/L (ref 98–107)
CHOLEST SERPL-MCNC: 79 MG/DL (ref 0–200)
CO2 SERPL-SCNC: 30.2 MMOL/L (ref 22–29)
CREAT SERPL-MCNC: 1.4 MG/DL (ref 0.76–1.27)
DEPRECATED RDW RBC AUTO: 41.4 FL (ref 37–54)
EGFRCR SERPLBLD CKD-EPI 2021: 54.7 ML/MIN/1.73
EOSINOPHIL # BLD AUTO: 0.09 10*3/MM3 (ref 0–0.4)
EOSINOPHIL NFR BLD AUTO: 1.3 % (ref 0.3–6.2)
ERYTHROCYTE [DISTWIDTH] IN BLOOD BY AUTOMATED COUNT: 12 % (ref 12.3–15.4)
GLOBULIN UR ELPH-MCNC: 3.8 GM/DL
GLUCOSE SERPL-MCNC: 113 MG/DL (ref 65–99)
HCT VFR BLD AUTO: 53 % (ref 37.5–51)
HDLC SERPL-MCNC: 35 MG/DL (ref 40–60)
HGB BLD-MCNC: 18.5 G/DL (ref 13–17.7)
IMM GRANULOCYTES # BLD AUTO: 0.01 10*3/MM3 (ref 0–0.05)
IMM GRANULOCYTES NFR BLD AUTO: 0.1 % (ref 0–0.5)
LDLC SERPL CALC-MCNC: 30 MG/DL (ref 0–100)
LDLC/HDLC SERPL: 0.93 {RATIO}
LYMPHOCYTES # BLD AUTO: 1.42 10*3/MM3 (ref 0.7–3.1)
LYMPHOCYTES NFR BLD AUTO: 21.2 % (ref 19.6–45.3)
MCH RBC QN AUTO: 33.3 PG (ref 26.6–33)
MCHC RBC AUTO-ENTMCNC: 34.9 G/DL (ref 31.5–35.7)
MCV RBC AUTO: 95.3 FL (ref 79–97)
MONOCYTES # BLD AUTO: 0.38 10*3/MM3 (ref 0.1–0.9)
MONOCYTES NFR BLD AUTO: 5.7 % (ref 5–12)
NEUTROPHILS NFR BLD AUTO: 4.76 10*3/MM3 (ref 1.7–7)
NEUTROPHILS NFR BLD AUTO: 71 % (ref 42.7–76)
NRBC BLD AUTO-RTO: 0 /100 WBC (ref 0–0.2)
NT-PROBNP SERPL-MCNC: ABNORMAL PG/ML (ref 0–900)
PLATELET # BLD AUTO: 146 10*3/MM3 (ref 140–450)
PMV BLD AUTO: 11.1 FL (ref 6–12)
POTASSIUM SERPL-SCNC: 3.8 MMOL/L (ref 3.5–5.2)
PROT SERPL-MCNC: 7.8 G/DL (ref 6–8.5)
RBC # BLD AUTO: 5.56 10*6/MM3 (ref 4.14–5.8)
SODIUM SERPL-SCNC: 132 MMOL/L (ref 136–145)
TRIGL SERPL-MCNC: 57 MG/DL (ref 0–150)
TSH SERPL DL<=0.05 MIU/L-ACNC: 3.14 UIU/ML (ref 0.27–4.2)
VLDLC SERPL-MCNC: 14 MG/DL (ref 5–40)
WBC NRBC COR # BLD AUTO: 6.71 10*3/MM3 (ref 3.4–10.8)

## 2024-10-03 PROCEDURE — 85025 COMPLETE CBC W/AUTO DIFF WBC: CPT

## 2024-10-03 PROCEDURE — 80053 COMPREHEN METABOLIC PANEL: CPT

## 2024-10-03 PROCEDURE — 80061 LIPID PANEL: CPT

## 2024-10-03 PROCEDURE — 36415 COLL VENOUS BLD VENIPUNCTURE: CPT

## 2024-10-03 PROCEDURE — 84443 ASSAY THYROID STIM HORMONE: CPT

## 2024-10-03 PROCEDURE — 83880 ASSAY OF NATRIURETIC PEPTIDE: CPT

## 2024-10-04 ENCOUNTER — TELEPHONE (OUTPATIENT)
Dept: CARDIOLOGY | Facility: CLINIC | Age: 69
End: 2024-10-04
Payer: MEDICARE

## 2024-10-04 NOTE — TELEPHONE ENCOUNTER
----- Message from Lilly Jefferson sent at 10/4/2024  9:50 AM EDT -----  BNP is elevated, find out if having any shortness of breath/edema

## 2024-10-04 NOTE — TELEPHONE ENCOUNTER
SW patient. Patient states that he has shortness of breath all the time, worse with exertion. Patient states swelling has improved. Patient states he is compliant with medication.

## 2024-10-07 ENCOUNTER — TELEPHONE (OUTPATIENT)
Dept: CARDIOLOGY | Facility: CLINIC | Age: 69
End: 2024-10-07
Payer: MEDICARE

## 2024-10-07 DIAGNOSIS — I50.32 CHRONIC HEART FAILURE WITH PRESERVED EJECTION FRACTION (HFPEF): Primary | ICD-10-CM

## 2024-10-07 NOTE — TELEPHONE ENCOUNTER
SW patient and daughter, Amanda Rosenthal, regarding results and recommendations. Voiced understanding.

## 2024-10-07 NOTE — TELEPHONE ENCOUNTER
SW patient. Patient states that swelling has improved significantly. Patient states that shortness of breath has not gotten any worse but has not improved.

## 2024-10-14 ENCOUNTER — LAB (OUTPATIENT)
Dept: LAB | Facility: HOSPITAL | Age: 69
End: 2024-10-14
Payer: MEDICARE

## 2024-10-14 DIAGNOSIS — I50.32 CHRONIC HEART FAILURE WITH PRESERVED EJECTION FRACTION (HFPEF): ICD-10-CM

## 2024-10-14 LAB
ANION GAP SERPL CALCULATED.3IONS-SCNC: 16.4 MMOL/L (ref 5–15)
BUN SERPL-MCNC: 39 MG/DL (ref 8–23)
BUN/CREAT SERPL: 24.5 (ref 7–25)
CALCIUM SPEC-SCNC: 10.1 MG/DL (ref 8.6–10.5)
CHLORIDE SERPL-SCNC: 86 MMOL/L (ref 98–107)
CO2 SERPL-SCNC: 30.6 MMOL/L (ref 22–29)
CREAT SERPL-MCNC: 1.59 MG/DL (ref 0.76–1.27)
EGFRCR SERPLBLD CKD-EPI 2021: 47 ML/MIN/1.73
GLUCOSE SERPL-MCNC: 88 MG/DL (ref 65–99)
NT-PROBNP SERPL-MCNC: ABNORMAL PG/ML (ref 0–900)
POTASSIUM SERPL-SCNC: 4.3 MMOL/L (ref 3.5–5.2)
SODIUM SERPL-SCNC: 133 MMOL/L (ref 136–145)

## 2024-10-14 PROCEDURE — 83880 ASSAY OF NATRIURETIC PEPTIDE: CPT

## 2024-10-14 PROCEDURE — 80048 BASIC METABOLIC PNL TOTAL CA: CPT

## 2024-10-14 PROCEDURE — 36415 COLL VENOUS BLD VENIPUNCTURE: CPT

## 2024-10-17 ENCOUNTER — TELEPHONE (OUTPATIENT)
Dept: CARDIOLOGY | Facility: CLINIC | Age: 69
End: 2024-10-17
Payer: MEDICARE

## 2024-10-17 DIAGNOSIS — I50.32 CHRONIC HEART FAILURE WITH PRESERVED EJECTION FRACTION (HFPEF): Primary | ICD-10-CM

## 2024-10-17 RX ORDER — FUROSEMIDE 40 MG
40 TABLET ORAL DAILY
Qty: 90 TABLET | Refills: 3 | Status: SHIPPED | OUTPATIENT
Start: 2024-10-17

## 2024-10-17 NOTE — TELEPHONE ENCOUNTER
----- Message from Lilly Jefferson sent at 10/17/2024  9:52 AM EDT -----  BNP is still elevated, find out if having any shortness of breath or edema

## 2024-10-24 ENCOUNTER — LAB (OUTPATIENT)
Dept: FAMILY MEDICINE CLINIC | Facility: CLINIC | Age: 69
End: 2024-10-24
Payer: MEDICARE

## 2024-10-24 DIAGNOSIS — R17 ELEVATED BILIRUBIN: Primary | ICD-10-CM

## 2024-10-24 LAB
ALBUMIN SERPL-MCNC: 3.9 G/DL (ref 3.5–5.2)
ALBUMIN/GLOB SERPL: 0.9 G/DL
ALP SERPL-CCNC: 199 U/L (ref 39–117)
ALT SERPL W P-5'-P-CCNC: 44 U/L (ref 1–41)
ANION GAP SERPL CALCULATED.3IONS-SCNC: 20.3 MMOL/L (ref 5–15)
AST SERPL-CCNC: 45 U/L (ref 1–40)
BILIRUB SERPL-MCNC: 1.1 MG/DL (ref 0–1.2)
BUN SERPL-MCNC: 24 MG/DL (ref 8–23)
BUN/CREAT SERPL: 20.7 (ref 7–25)
CALCIUM SPEC-SCNC: 9.6 MG/DL (ref 8.6–10.5)
CHLORIDE SERPL-SCNC: 88 MMOL/L (ref 98–107)
CO2 SERPL-SCNC: 25.7 MMOL/L (ref 22–29)
CREAT SERPL-MCNC: 1.16 MG/DL (ref 0.76–1.27)
EGFRCR SERPLBLD CKD-EPI 2021: 68.6 ML/MIN/1.73
GLOBULIN UR ELPH-MCNC: 4.5 GM/DL
GLUCOSE SERPL-MCNC: 108 MG/DL (ref 65–99)
POTASSIUM SERPL-SCNC: 3.5 MMOL/L (ref 3.5–5.2)
PROT SERPL-MCNC: 8.4 G/DL (ref 6–8.5)
SODIUM SERPL-SCNC: 134 MMOL/L (ref 136–145)

## 2024-10-24 PROCEDURE — 36415 COLL VENOUS BLD VENIPUNCTURE: CPT | Performed by: NURSE PRACTITIONER

## 2024-10-24 PROCEDURE — 80053 COMPREHEN METABOLIC PANEL: CPT | Performed by: NURSE PRACTITIONER

## 2025-01-23 DIAGNOSIS — J44.9 CHRONIC OBSTRUCTIVE PULMONARY DISEASE, UNSPECIFIED COPD TYPE: ICD-10-CM

## 2025-01-23 RX ORDER — TIOTROPIUM BROMIDE AND OLODATEROL 3.124; 2.736 UG/1; UG/1
SPRAY, METERED RESPIRATORY (INHALATION)
Qty: 4 G | Refills: 5 | Status: SHIPPED | OUTPATIENT
Start: 2025-01-23

## 2025-02-24 DIAGNOSIS — E78.5 HYPERLIPIDEMIA LDL GOAL <70: ICD-10-CM

## 2025-02-25 RX ORDER — ATORVASTATIN CALCIUM 20 MG/1
20 TABLET, FILM COATED ORAL DAILY
Qty: 90 TABLET | Refills: 3 | Status: SHIPPED | OUTPATIENT
Start: 2025-02-25

## 2025-02-26 ENCOUNTER — OFFICE VISIT (OUTPATIENT)
Dept: PULMONOLOGY | Facility: CLINIC | Age: 70
End: 2025-02-26
Payer: MEDICARE

## 2025-02-26 ENCOUNTER — HOSPITAL ENCOUNTER (OUTPATIENT)
Facility: HOSPITAL | Age: 70
Discharge: HOME | End: 2025-02-26
Admitting: INTERNAL MEDICINE
Payer: MEDICARE

## 2025-02-26 VITALS
BODY MASS INDEX: 28.77 KG/M2 | HEIGHT: 70 IN | TEMPERATURE: 97.8 F | RESPIRATION RATE: 16 BRPM | WEIGHT: 201 LBS | OXYGEN SATURATION: 93 % | SYSTOLIC BLOOD PRESSURE: 106 MMHG | HEART RATE: 82 BPM | DIASTOLIC BLOOD PRESSURE: 63 MMHG

## 2025-02-26 DIAGNOSIS — J44.1 COPD WITH ACUTE EXACERBATION: Primary | ICD-10-CM

## 2025-02-26 DIAGNOSIS — G47.33 OSA (OBSTRUCTIVE SLEEP APNEA): ICD-10-CM

## 2025-02-26 DIAGNOSIS — J44.1 COPD WITH ACUTE EXACERBATION: ICD-10-CM

## 2025-02-26 PROCEDURE — 71046 X-RAY EXAM CHEST 2 VIEWS: CPT

## 2025-02-26 RX ORDER — PREDNISONE 20 MG/1
20 TABLET ORAL DAILY
Qty: 14 TABLET | Refills: 0 | Status: SHIPPED | OUTPATIENT
Start: 2025-02-26

## 2025-02-26 RX ORDER — PREDNISONE 20 MG/1
20 TABLET ORAL DAILY
Qty: 7 TABLET | Refills: 0 | Status: SHIPPED | OUTPATIENT
Start: 2025-02-26

## 2025-02-26 RX ORDER — ALBUTEROL SULFATE 1.25 MG/3ML
1 SOLUTION RESPIRATORY (INHALATION) EVERY 6 HOURS PRN
Qty: 120 ML | Refills: 4 | Status: SHIPPED | OUTPATIENT
Start: 2025-02-26

## 2025-02-26 RX ORDER — TRIAMTERENE AND HYDROCHLOROTHIAZIDE 37.5; 25 MG/1; MG/1
TABLET ORAL
COMMUNITY
Start: 2025-02-25

## 2025-02-26 RX ORDER — BUDESONIDE, GLYCOPYRROLATE, AND FORMOTEROL FUMARATE 160; 9; 4.8 UG/1; UG/1; UG/1
2 AEROSOL, METERED RESPIRATORY (INHALATION) 2 TIMES DAILY
Qty: 3 EACH | Refills: 4 | Status: SHIPPED | OUTPATIENT
Start: 2025-02-26

## 2025-02-26 NOTE — PROGRESS NOTES
"Chief Complaint  Follow-up (6 month), COPD, Sleep Apnea, Cough (Dry cough), Wheezing, and Shortness of Breath (Consistent- seems to have gotten worse in the past 2 months)    Subjective        Sacha Arzola presents to [facility] PULMONARY & [facility]ICAL CARE MEDICINE  History of Present Illness  History of Present Illness  The patient is a 70-year-old male here for follow-up for COPD.    He continues to smoke and reports severe respiratory distress. He is uncertain about the efficacy of his current medications and inhalers.    SOCIAL HISTORY  He admits to smoking.    MEDICATIONS  Current: Stiolto, albuterol    Objective   Vital Signs:  /63 (BP Location: Left arm, Patient Position: Sitting, Cuff Size: Adult)   Pulse 82   Temp 97.8 °F (36.6 °C) (Temporal)   Resp 16   Ht 177.8 cm (70\")   Wt 91.2 kg (201 lb)   SpO2 93% Comment: room air  BMI 28.84 kg/m²   Estimated body mass index is 28.84 kg/m² as calculated from the following:    Height as of this encounter: 177.8 cm (70\").    Weight as of this encounter: 91.2 kg (201 lb).          Physical Exam   Physical Exam  There is no significant edema in the lower extremities.    Result Review :         Results                Assessment and Plan   Diagnoses and all orders for this visit:    1. COPD with acute exacerbation (Primary)  -     XR Chest 2 View; Future    2. KATHARINE (obstructive sleep apnea)    Other orders  -     amoxicillin-clavulanate (AUGMENTIN) 875-125 MG per tablet; Take 1 tablet by mouth 2 (Two) Times a Day for 7 days.  Dispense: 14 tablet; Refill: 0  -     predniSONE (DELTASONE) 20 MG tablet; Take 1 tablet by mouth Daily.  Dispense: 7 tablet; Refill: 0  -     Budeson-Glycopyrrol-Formoterol (Breztri Aerosphere) 160-9-4.8 MCG/ACT aerosol inhaler; Inhale 2 puffs 2 (Two) Times a Day.  Dispense: 3 each; Refill: 4  -     amoxicillin-clavulanate (AUGMENTIN) 875-125 MG per tablet; Take 1 tablet by mouth 2 (Two) Times a Day for 7 days.  " Dispense: 14 tablet; Refill: 0  -     predniSONE (DELTASONE) 20 MG tablet; Take 1 tablet by mouth Daily.  Dispense: 14 tablet; Refill: 0  -     albuterol (ACCUNEB) 1.25 MG/3ML nebulizer solution; Take 3 mL by nebulization Every 6 (Six) Hours As Needed for Wheezing.  Dispense: 120 mL; Refill: 4      Assessment & Plan  1. Chronic Obstructive Pulmonary Disease (COPD).  He reports that his breathing is terrible and that his current medications and inhalers are not effective. Stiolto will be discontinued and replaced with Breztri, 2 puffs twice daily. A prescription for albuterol and a nebulizer machine will be provided. A chest x-ray will also be obtained.    2. Acute COPD exacerbation.  A 5-day burst of prednisone will be administered. Augmentin 875 mg, 1 tablet twice daily for 7 days, will also be prescribed to treat the presumptive acute sinusitis, as he has right-sided maxillary sinus tenderness and increased drainage.    3. Obstructive sleep apnea.  He reports compliance and is doing well with his current therapy. PAP therapy will continue at the current settings.    Follow-up  The patient will follow up in 2 weeks to assess for improvement.         Follow Up   Return in about 2 weeks (around 3/12/2025).  Patient was given instructions and counseling regarding his condition or for health maintenance advice. Please see specific information pulled into the AVS if appropriate.         Patient or patient representative verbalized consent for the use of Ambient Listening during the visit with  Darek Mccormick DO for chart documentation. 2/26/2025  15:34 EST

## 2025-03-03 DIAGNOSIS — I63.9 CEREBROVASCULAR ACCIDENT (CVA), UNSPECIFIED MECHANISM: ICD-10-CM

## 2025-03-03 RX ORDER — LEVETIRACETAM 500 MG/1
1000 TABLET ORAL 2 TIMES DAILY
Qty: 360 TABLET | Refills: 3 | Status: SHIPPED | OUTPATIENT
Start: 2025-03-03

## 2025-03-10 ENCOUNTER — HOSPITAL ENCOUNTER (OUTPATIENT)
Dept: CT IMAGING | Facility: HOSPITAL | Age: 70
Discharge: HOME | End: 2025-03-10
Admitting: NURSE PRACTITIONER
Payer: MEDICARE

## 2025-03-10 DIAGNOSIS — R93.89 ABNORMAL CT OF THE CHEST: ICD-10-CM

## 2025-03-10 DIAGNOSIS — F17.219 CIGARETTE NICOTINE DEPENDENCE WITH NICOTINE-INDUCED DISORDER: ICD-10-CM

## 2025-03-10 PROCEDURE — 71250 CT THORAX DX C-: CPT

## 2025-03-12 ENCOUNTER — OFFICE VISIT (OUTPATIENT)
Dept: PULMONOLOGY | Facility: CLINIC | Age: 70
End: 2025-03-12
Payer: MEDICARE

## 2025-03-12 VITALS
SYSTOLIC BLOOD PRESSURE: 109 MMHG | TEMPERATURE: 97.6 F | HEIGHT: 70 IN | DIASTOLIC BLOOD PRESSURE: 64 MMHG | HEART RATE: 61 BPM | BODY MASS INDEX: 28.77 KG/M2 | RESPIRATION RATE: 16 BRPM | OXYGEN SATURATION: 95 % | WEIGHT: 201 LBS

## 2025-03-12 DIAGNOSIS — R06.09 DYSPNEA ON EXERTION: ICD-10-CM

## 2025-03-12 DIAGNOSIS — J44.9 CHRONIC OBSTRUCTIVE PULMONARY DISEASE, UNSPECIFIED COPD TYPE: Primary | Chronic | ICD-10-CM

## 2025-03-12 DIAGNOSIS — G47.33 OSA (OBSTRUCTIVE SLEEP APNEA): Chronic | ICD-10-CM

## 2025-03-12 DIAGNOSIS — F17.210 NICOTINE DEPENDENCE, CIGARETTES, UNCOMPLICATED: ICD-10-CM

## 2025-03-12 RX ORDER — ALBUTEROL SULFATE 1.25 MG/3ML
1 SOLUTION RESPIRATORY (INHALATION) EVERY 6 HOURS PRN
Qty: 360 EACH | Refills: 3 | Status: SHIPPED | OUTPATIENT
Start: 2025-03-12

## 2025-03-12 RX ORDER — BUDESONIDE, GLYCOPYRROLATE, AND FORMOTEROL FUMARATE 160; 9; 4.8 UG/1; UG/1; UG/1
2 AEROSOL, METERED RESPIRATORY (INHALATION) 2 TIMES DAILY
Qty: 1 EACH | Refills: 0 | COMMUNITY
Start: 2025-03-12 | End: 2025-03-13

## 2025-03-12 NOTE — PROGRESS NOTES
Primary Care Provider  Lee Ann Wilhelm APRN     Referring Provider  No ref. provider found       Patient or patient representative verbalized consent for the use of Ambient Listening during the visit with  EVIN Davenport for chart documentation. 3/12/2025  08:33 EDT    Chief Complaint  COPD, Shortness of Breath, and Follow-up (2 week f/up - CXR 2/26)    Subjective          Sacha Arzola presents to [facility] PULMONARY & CRITICAL CARE MEDICINE  History of Present Illness  Sacha Arzola is a 70 y.o. male patient of Dr. Mccormick here for management of COPD, KATHARINE, shortness of breath and tobacco use of cigarettes ongoing.     Patient was seen last month by Dr. Mccormick and was prescribed amoxicillin along with prednisone.    History of Present Illness  The patient is a 70-year-old male who presents for evaluation of cough.    He reports an improvement in his condition since his last consultation with Dr. Mccormick. He has been utilizing a nebulizer twice daily, which he finds beneficial. He has not yet started Breztri as it is still in transit from [facility]. During his visit to Dr. Winters, he did not experience any productive cough. However, Dr. Mccormick identified a wet cough and prescribed an antibiotic, a steroid, and a nebulizer. He continues to smoke but expresses a desire to quit. A recent CT scan revealed multiple nodules in the left lower lobe, which have remained stable, with some even decreasing in size. Continued follow-up is recommended. He has a scheduled appointment with his primary care physician next week.    SOCIAL HISTORY  The patient admits to smoking and is not trying to quit.    MEDICATIONS  Current: Nebulizer         His history of smoking is   Tobacco Use: High Risk (3/12/2025)    Patient History     Smoking Tobacco Use: Every Day     Smokeless Tobacco Use: Never     Passive Exposure: Current   Sacha Arzola  reports that he has been smoking cigarettes. He  started smoking about 51 years ago. He has a 76.8 pack-year smoking history. He has been exposed to tobacco smoke. He has never used smokeless tobacco. I have educated him on the risk of diseases from using tobacco products such as cancer, COPD, and heart disease.     I advised him to quit and he is not willing to quit.    I spent 3  minutes counseling the patient.           Review of Systems   Constitutional:  Negative for chills, fatigue, fever, unexpected weight gain and unexpected weight loss.   HENT:  Congestion: Nasal.    Respiratory:  Positive for shortness of breath. Negative for apnea, cough and wheezing.         Negative for Hemoptysis     Cardiovascular:  Negative for chest pain, palpitations and leg swelling.   Skin:         Negative for cyanosis      Sleep: Negative for Excessive daytime sleepiness  Negative for morning headaches  Negative for Snoring    Family History   Problem Relation Age of Onset    Mental illness Mother     Heart disease Mother     Other Father         parkinson    Colon cancer Neg Hx         Social History     Socioeconomic History    Marital status:    Tobacco Use    Smoking status: Every Day     Current packs/day: 1.50     Average packs/day: 1.5 packs/day for 51.2 years (76.8 ttl pk-yrs)     Types: Cigarettes     Start date: 1974     Passive exposure: Current    Smokeless tobacco: Never    Tobacco comments:     1-1.5 PPD as of 7/5/2023 - [place]    Vaping Use    Vaping status: Never Used   Substance and Sexual Activity    Alcohol use: Yes     Alcohol/week: 1.0 standard drink of alcohol     Types: 1 Cans of beer per week     Comment: some days    Drug use: Never    Sexual activity: Not Currently     Partners: Female        Past Medical History:   Diagnosis Date    Atrial fibrillation, chronic 12/21/2021    Chronic heart failure with preserved ejection fraction 12/05/2019    COPD (chronic obstructive pulmonary disease) 12/05/2019    CVA (cerebral vascular accident) 2010     Essential hypertension 12/05/2019    Fatigue 12/05/2019    Peripheral vision loss 12/05/2019    Positive colorectal cancer screening using Cologuard test     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    Seizures         Immunization History   Administered Date(s) Administered    COVID-19 (ARMANDO) 06/16/2021    Flu Vaccine Quad PF >36MO 09/18/2018, 09/23/2020    Fluzone (or Fluarix & Flulaval for VFC) >6mos 09/18/2018, 09/23/2020, 09/29/2022    Fluzone High-Dose 65+YRS 09/26/2024    Fluzone High-Dose 65+yrs 10/11/2021, 09/28/2023    Influenza, Unspecified 09/29/2022    Pneumococcal Conjugate 20-Valent (PCV20) 09/29/2022    Pneumococcal Polysaccharide (PPSV23) 02/02/2017         No Known Allergies       Current Outpatient Medications:     albuterol (ACCUNEB) 1.25 MG/3ML nebulizer solution, Take 3 mL by nebulization Every 6 (Six) Hours As Needed for Wheezing., Disp: 360 each, Rfl: 3    albuterol sulfate  (90 Base) MCG/ACT inhaler, Inhale 2 puffs Every 4 (Four) Hours As Needed for Wheezing., Disp: 54 g, Rfl: 3    apixaban (Eliquis) 5 MG tablet tablet, Take 1 tablet by mouth 2 (Two) Times a Day., Disp: 180 tablet, Rfl: 3    Aspirin Adult Low Strength 81 MG EC tablet, TAKE ONE TABLET BY MOUTH ONCE DAILY, Disp: 90 tablet, Rfl: 3    atorvastatin (LIPITOR) 20 MG tablet, TAKE 1 TABLET DAILY, Disp: 90 tablet, Rfl: 3    Budeson-Glycopyrrol-Formoterol (Breztri Aerosphere) 160-9-4.8 MCG/ACT aerosol inhaler, Inhale 2 puffs 2 (Two) Times a Day. (Patient taking differently: Inhale 2 puffs 2 (Two) Times a Day. Not started yet.), Disp: 3 each, Rfl: 4    furosemide (LASIX) 40 MG tablet, Take 1 tablet by mouth Daily., Disp: 90 tablet, Rfl: 3    levETIRAcetam (KEPPRA) 500 MG tablet, TAKE 2 TABLETS TWICE A DAY, Disp: 360 tablet, Rfl: 3    lisinopril (PRINIVIL,ZESTRIL) 5 MG tablet, TAKE ONE TABLET BY MOUTH DAILY, Disp: 90 tablet, Rfl: 3    metoprolol succinate XL (TOPROL-XL) 200 MG 24 hr tablet, TAKE ONE TABLET BY MOUTH DAILY, Disp: 90  "tablet, Rfl: 3    omega-3 acid ethyl esters (LOVAZA) 1 g capsule, TAKE ONE CAPSULE BY MOUTH TWICE DAILY, Disp: 180 capsule, Rfl: 3    potassium chloride 10 MEQ CR tablet, TAKE TWO TABLETS BY MOUTH DAILY, Disp: 180 tablet, Rfl: 3    silver sulfadiazine (Silvadene) 1 % cream, Apply 1 Application topically to the appropriate area as directed 2 (Two) Times a Day., Disp: 50 g, Rfl: 0    triamterene-hydrochlorothiazide (MAXZIDE-25) 37.5-25 MG per tablet, , Disp: , Rfl:     Budeson-Glycopyrrol-Formoterol (Breztri Aerosphere) 160-9-4.8 MCG/ACT aerosol inhaler, Inhale 2 puffs 2 (Two) Times a Day for 1 day., Disp: 1 each, Rfl: 0     Objective   Physical Exam  Constitutional:       General: He is not in acute distress.     Appearance: Normal appearance. He is normal weight.   HENT:      Right Ear: Hearing normal.      Left Ear: Hearing normal.      Nose: No nasal tenderness or congestion.      Mouth/Throat:      Mouth: Mucous membranes are moist. No oral lesions.   Eyes:      Extraocular Movements: Extraocular movements intact.      Pupils: Pupils are equal, round, and reactive to light.   Cardiovascular:      Rate and Rhythm: Normal rate and regular rhythm.      Pulses: Normal pulses.      Heart sounds: Normal heart sounds. No murmur heard.  Pulmonary:      Effort: Pulmonary effort is normal.      Breath sounds: Decreased breath sounds present. No wheezing, rhonchi or rales.   Musculoskeletal:      Right lower leg: No edema.      Left lower leg: No edema.   Skin:     General: Skin is warm and dry.      Findings: No lesion or rash.   Neurological:      General: No focal deficit present.      Mental Status: He is alert and oriented to person, place, and time.   Psychiatric:         Mood and Affect: Affect normal. Mood is not anxious or depressed.         Vital Signs:   /64 (BP Location: Left arm, Patient Position: Sitting, Cuff Size: Large Adult)   Pulse 61   Temp 97.6 °F (36.4 °C) (Oral)   Resp 16   Ht 177.8 cm (70\") "   Wt 91.2 kg (201 lb)   SpO2 95% Comment: RA  BMI 28.84 kg/m²        Result Review :   The following data was reviewed by: EVIN Davenport on 03/12/2025:  CMP          10/3/2024    08:42 10/14/2024    08:40 10/24/2024    07:34   CMP   Glucose 113  88  108    BUN 21  39  24    Creatinine 1.40  1.59  1.16    EGFR 54.7  47.0  68.6    Sodium 132  133  134    Potassium 3.8  4.3  3.5    Chloride 87  86  88    Calcium 9.4  10.1  9.6    Total Protein 7.8   8.4    Albumin 4.0   3.9    Globulin 3.8   4.5    Total Bilirubin 1.8   1.1    Alkaline Phosphatase 145   199    AST (SGOT) 24   45    ALT (SGPT) 18   44    Albumin/Globulin Ratio 1.1   0.9    BUN/Creatinine Ratio 15.0  24.5  20.7    Anion Gap 14.8  16.4  20.3      CBC w/diff          3/28/2024    07:57 10/3/2024    08:42   CBC w/Diff   WBC 7.31  6.71    RBC 5.49  5.56    Hemoglobin 17.8  18.5    Hematocrit 51.3  53.0    MCV 93.4  95.3    MCH 32.4  33.3    MCHC 34.7  34.9    RDW 12.2  12.0    Platelets 194  146    Neutrophil Rel % 73.2  71.0    Immature Granulocyte Rel % 0.3  0.1    Lymphocyte Rel % 18.1  21.2    Monocyte Rel % 5.7  5.7    Eosinophil Rel % 1.9  1.3    Basophil Rel % 0.8  0.7      Data reviewed : Radiologic studies chest xray 2/26/2025, chest CT 3/10/2025 and Dr. Mccormick's last office note    Procedures        Assessment and Plan    Diagnoses and all orders for this visit:    1. Chronic obstructive pulmonary disease, unspecified COPD type (Primary)  Comments:  continue Breztri  Orders:  -     albuterol (ACCUNEB) 1.25 MG/3ML nebulizer solution; Take 3 mL by nebulization Every 6 (Six) Hours As Needed for Wheezing.  Dispense: 360 each; Refill: 3  -     Budeson-Glycopyrrol-Formoterol (Breztri Aerosphere) 160-9-4.8 MCG/ACT aerosol inhaler; Inhale 2 puffs 2 (Two) Times a Day for 1 day.  Dispense: 1 each; Refill: 0    2. KATHARINE (obstructive sleep apnea)  Comments:  continue CPAP.  patient is using and benefiting.    3. Dyspnea on exertion    4. Nicotine  dependence, cigarettes, uncomplicated  Comments:  smoking cessation education      Smoking cessation counseling provided.  I spent 3 minutes today counseling patient on the risks of smoking, including throat cancer, lung cancer, COPD, heart disease and death.  Also discussed the benefits of quitting.   Assessment & Plan  1. Cough.  His chest x-ray results are within normal limits. The CT scan conducted on Monday revealed multiple nodules in the left lower lobe, which have remained stable, with some even reducing in size. Continued follow-up is recommended. He has been advised to continue using the nebulizer as needed, with a frequency of twice daily during periods of increased shortness of breath. A prescription for a 3-month supply of the nebulizer medication will be sent to [facility].          Follow Up   Return in about 6 months (around 9/12/2025) for Recheck with Alanis.  Patient was given instructions and counseling regarding his condition or for health maintenance advice. Please see specific information pulled into the AVS if appropriate.

## 2025-03-17 ENCOUNTER — OFFICE VISIT (OUTPATIENT)
Dept: FAMILY MEDICINE CLINIC | Facility: CLINIC | Age: 70
End: 2025-03-17
Payer: MEDICARE

## 2025-03-17 VITALS
DIASTOLIC BLOOD PRESSURE: 60 MMHG | SYSTOLIC BLOOD PRESSURE: 102 MMHG | HEART RATE: 99 BPM | OXYGEN SATURATION: 93 % | WEIGHT: 189.6 LBS | BODY MASS INDEX: 27.14 KG/M2 | HEIGHT: 70 IN | TEMPERATURE: 98.6 F | RESPIRATION RATE: 24 BRPM

## 2025-03-17 DIAGNOSIS — I63.9 CEREBROVASCULAR ACCIDENT (CVA), UNSPECIFIED MECHANISM: ICD-10-CM

## 2025-03-17 DIAGNOSIS — Z86.73 HISTORY OF STROKE: ICD-10-CM

## 2025-03-17 DIAGNOSIS — D58.2 ELEVATED HEMOGLOBIN: Primary | ICD-10-CM

## 2025-03-17 DIAGNOSIS — G47.33 OSA (OBSTRUCTIVE SLEEP APNEA): ICD-10-CM

## 2025-03-17 DIAGNOSIS — F17.219 CIGARETTE NICOTINE DEPENDENCE WITH NICOTINE-INDUCED DISORDER: ICD-10-CM

## 2025-03-17 DIAGNOSIS — J44.9 CHRONIC OBSTRUCTIVE PULMONARY DISEASE, UNSPECIFIED COPD TYPE: ICD-10-CM

## 2025-03-17 DIAGNOSIS — R63.4 WEIGHT LOSS: ICD-10-CM

## 2025-03-17 LAB
ALBUMIN SERPL-MCNC: 3.3 G/DL (ref 3.5–5.2)
ALBUMIN/GLOB SERPL: 1 G/DL
ALP SERPL-CCNC: 226 U/L (ref 39–117)
ALT SERPL W P-5'-P-CCNC: 37 U/L (ref 1–41)
ANION GAP SERPL CALCULATED.3IONS-SCNC: 10 MMOL/L (ref 5–15)
AST SERPL-CCNC: 31 U/L (ref 1–40)
BASOPHILS # BLD AUTO: 0.04 10*3/MM3 (ref 0–0.2)
BASOPHILS NFR BLD AUTO: 0.5 % (ref 0–1.5)
BILIRUB SERPL-MCNC: 0.6 MG/DL (ref 0–1.2)
BUN SERPL-MCNC: 20 MG/DL (ref 8–23)
BUN/CREAT SERPL: 18.5 (ref 7–25)
CALCIUM SPEC-SCNC: 8.9 MG/DL (ref 8.6–10.5)
CHLORIDE SERPL-SCNC: 97 MMOL/L (ref 98–107)
CO2 SERPL-SCNC: 29 MMOL/L (ref 22–29)
CREAT SERPL-MCNC: 1.08 MG/DL (ref 0.76–1.27)
DEPRECATED RDW RBC AUTO: 40.9 FL (ref 37–54)
EGFRCR SERPLBLD CKD-EPI 2021: 74.3 ML/MIN/1.73
EOSINOPHIL # BLD AUTO: 0.08 10*3/MM3 (ref 0–0.4)
EOSINOPHIL NFR BLD AUTO: 1.1 % (ref 0.3–6.2)
ERYTHROCYTE [DISTWIDTH] IN BLOOD BY AUTOMATED COUNT: 11.7 % (ref 12.3–15.4)
FOLATE SERPL-MCNC: 8.25 NG/ML (ref 4.78–24.2)
GLOBULIN UR ELPH-MCNC: 3.4 GM/DL
GLUCOSE SERPL-MCNC: 111 MG/DL (ref 65–99)
HCT VFR BLD AUTO: 51.2 % (ref 37.5–51)
HGB BLD-MCNC: 17.8 G/DL (ref 13–17.7)
IMM GRANULOCYTES # BLD AUTO: 0.03 10*3/MM3 (ref 0–0.05)
IMM GRANULOCYTES NFR BLD AUTO: 0.4 % (ref 0–0.5)
LYMPHOCYTES # BLD AUTO: 1.16 10*3/MM3 (ref 0.7–3.1)
LYMPHOCYTES NFR BLD AUTO: 15.5 % (ref 19.6–45.3)
MCH RBC QN AUTO: 33.3 PG (ref 26.6–33)
MCHC RBC AUTO-ENTMCNC: 34.8 G/DL (ref 31.5–35.7)
MCV RBC AUTO: 95.9 FL (ref 79–97)
MONOCYTES # BLD AUTO: 0.32 10*3/MM3 (ref 0.1–0.9)
MONOCYTES NFR BLD AUTO: 4.3 % (ref 5–12)
NEUTROPHILS NFR BLD AUTO: 5.83 10*3/MM3 (ref 1.7–7)
NEUTROPHILS NFR BLD AUTO: 78.2 % (ref 42.7–76)
PLATELET # BLD AUTO: 137 10*3/MM3 (ref 140–450)
PMV BLD AUTO: 11.2 FL (ref 6–12)
POTASSIUM SERPL-SCNC: 4.3 MMOL/L (ref 3.5–5.2)
PROT SERPL-MCNC: 6.7 G/DL (ref 6–8.5)
RBC # BLD AUTO: 5.34 10*6/MM3 (ref 4.14–5.8)
SODIUM SERPL-SCNC: 136 MMOL/L (ref 136–145)
VIT B12 BLD-MCNC: 479 PG/ML (ref 211–946)
WBC NRBC COR # BLD AUTO: 7.46 10*3/MM3 (ref 3.4–10.8)

## 2025-03-17 PROCEDURE — 80053 COMPREHEN METABOLIC PANEL: CPT | Performed by: NURSE PRACTITIONER

## 2025-03-17 PROCEDURE — 1160F RVW MEDS BY RX/DR IN RCRD: CPT | Performed by: NURSE PRACTITIONER

## 2025-03-17 PROCEDURE — 82746 ASSAY OF FOLIC ACID SERUM: CPT | Performed by: NURSE PRACTITIONER

## 2025-03-17 PROCEDURE — 1159F MED LIST DOCD IN RCRD: CPT | Performed by: NURSE PRACTITIONER

## 2025-03-17 PROCEDURE — 85025 COMPLETE CBC W/AUTO DIFF WBC: CPT | Performed by: NURSE PRACTITIONER

## 2025-03-17 PROCEDURE — 99214 OFFICE O/P EST MOD 30 MIN: CPT | Performed by: NURSE PRACTITIONER

## 2025-03-17 PROCEDURE — 36415 COLL VENOUS BLD VENIPUNCTURE: CPT | Performed by: NURSE PRACTITIONER

## 2025-03-17 PROCEDURE — 1126F AMNT PAIN NOTED NONE PRSNT: CPT | Performed by: NURSE PRACTITIONER

## 2025-03-17 PROCEDURE — 3074F SYST BP LT 130 MM HG: CPT | Performed by: NURSE PRACTITIONER

## 2025-03-17 PROCEDURE — 3078F DIAST BP <80 MM HG: CPT | Performed by: NURSE PRACTITIONER

## 2025-03-17 PROCEDURE — 82607 VITAMIN B-12: CPT | Performed by: NURSE PRACTITIONER

## 2025-03-17 NOTE — PROGRESS NOTES
Chief Complaint  COPD, Hyperlipidemia, and Hypertension    Subjective          Sacha Arzola presents to [facility]  History of Present Illness  He is here with his daughter Amanda  History of Present Illness  The patient presents for evaluation of leg swelling, weight loss, pulmonary emphysema, gallstones, hiatal hernia, and memory loss.    He reports a decline in his leg condition during the Christmas period, which she spent in [place] with her husbands family. He is currently working on restoring it to its previous state. The posterior aspect of his leg has shown improvement, but the anterior part remains problematic. He has been managing his own wound care, applying a generous amount of dressing due to his inability to change it daily. He has been wearing compression stockings consistently and taking diuretics every morning, although he omitted the dose today due to his clinic visit. He has experienced two recent falls, one on a snowy deck and another at home, both resulting in back injuries. He did not sustain any head injuries or loss of consciousness during these incidents. He also reported side pain following the outdoor fall, which occurred approximately a month ago, but he has not sought medical attention for this symptom. He has a scheduled appointment with Dr. Ch on Thursday.    He has been experiencing weight loss despite an increased appetite. He has not been feeling well recently, with no specific aches or pains. He has been diagnosed with gallstones.    He uses a CPAP machine nightly and a nebulizer twice daily. His smoking habit has slightly decreased. He was previously prescribed an antibiotic and steroid by Dr. Mccormick who also adjusted his medications. He was not breathing well when he saw [facility], who noted that his lungs sounded better    He has a history of hiatal hernia.    He has been experiencing memory loss.    SOCIAL HISTORY  The patient admits to  smoking, but the amount has decreased slightly.    MEDICATIONS  Current: Lasix, Breztri      Patient or patient representative verbalized consent for the use of Ambient Listening during the visit with  EVIN Strong for chart documentation. 3/17/2025  07:37 EDT      Depression: Not at risk (9/26/2024)    PHQ-2     PHQ-2 Score: 0    and     BMI is >= 25 and <30. (Overweight) The following options were offered after discussion;: exercise counseling/recommendations and nutrition counseling/recommendations         Allergies  Patient has no known allergies.    Social History     Tobacco Use    Smoking status: Every Day     Current packs/day: 1.50     Average packs/day: 1.5 packs/day for 51.2 years (76.8 ttl pk-yrs)     Types: Cigarettes     Start date: 1974     Passive exposure: Current    Smokeless tobacco: Never    Tobacco comments:     1-1.5 PPD as of 7/5/2023 - [place]    Vaping Use    Vaping status: Never Used   Substance Use Topics    Alcohol use: Yes     Alcohol/week: 1.0 standard drink of alcohol     Types: 1 Cans of beer per week     Comment: some days    Drug use: Never       Family History   Problem Relation Age of Onset    Mental illness Mother     Heart disease Mother     Other Father         parkinson    Colon cancer Neg Hx         There are no preventive care reminders to display for this patient.       Immunization History   Administered Date(s) Administered    COVID-19 ([facility]O) 06/16/2021    Flu Vaccine Quad PF >36MO 09/18/2018, 09/23/2020    Fluzone (or Fluarix & Flulaval for VFC) >6mos 09/18/2018, 09/23/2020, 09/29/2022    Fluzone High-Dose 65+YRS 09/26/2024    Fluzone High-Dose 65+yrs 10/11/2021, 09/28/2023    Influenza, Unspecified 09/29/2022    Pneumococcal Conjugate 20-Valent (PCV20) 09/29/2022    Pneumococcal Polysaccharide (PPSV23) 02/02/2017       Review of Systems   Constitutional:  Positive for fatigue and unexpected weight loss.   Respiratory:  Positive for cough.    Cardiovascular:   "Positive for leg swelling.        Objective       Vitals:    03/17/25 0723   BP: 102/60   Pulse: 99   Resp: 24   Temp: 98.6 °F (37 °C)   SpO2: 93%   Weight: 86 kg (189 lb 9.6 oz)   Height: 177.8 cm (70\")       Body mass index is 27.2 kg/m².         Physical Exam  Vitals reviewed.   Constitutional:       Appearance: Normal appearance. He is well-developed.   Cardiovascular:      Rate and Rhythm: Normal rate and regular rhythm.      Heart sounds: Normal heart sounds. No murmur heard.  Pulmonary:      Effort: Pulmonary effort is normal.      Breath sounds: Wheezing and rhonchi present.   Musculoskeletal:      Right lower leg: No edema.      Left lower leg: Edema present.      Comments: The vascular ulcer is still on the left-hand side though the skin around it is in good shape.  They are going to change the dressing when they get home.   Skin:         Neurological:      Mental Status: He is alert and oriented to person, place, and time.      Cranial Nerves: No cranial nerve deficit.      Motor: No weakness.   Psychiatric:         Mood and Affect: Mood and affect normal.           Physical Exam                Result Review :     The following data was reviewed by: EVIN Strong on 03/17/2025:            CT Chest Without Contrast Diagnostic  Result Date: 3/12/2025  Impression: 1. Stable noncalcified nodules in the lower lobes measuring up to 8 mm. 2. Persistent bronchial wall thickening with endobronchial secretion in the left lower lobe with developing airspace consolidation in the lower lobe. This may be secondary to an atypical infectious process. Suggest correlation with any history of aspiration. 3. Stable small pericardial effusion. 4. Cholelithiasis. Moderate hiatal hernia. 5. Stable prominent central pulmonary artery suggesting pulmonary arterial hypertension. 6. Pulmonary emphysema. 7. Cirrhosis. Electronically Signed: Ricco Barkley MD  3/12/2025 12:27 PM EDT  Workstation ID: [id number]    XR Chest 2 " View  Result Date: 2/27/2025  Impression: 1. Borderline cardiomegaly with streaky bilateral lower lobe atelectasis. No significant change from prior radiograph in 2016 Electronically Signed: Loc Moise  2/27/2025 10:12 PM EST  Workstation ID: [id number]           Results  Laboratory Studies  Last hemoglobin was 18. Liver functions were slightly off.    Imaging  Chest CT showed stable findings.  The CT did show that he had gallstones and a hiatal hernia.                    Assessment and Plan      Assessment & Plan  Elevated hemoglobin  We will check his labs today  Orders:    CBC Auto Differential    Vitamin B12 & Folate    Comprehensive Metabolic Panel    History of stroke  I did a refill of his Keppra earlier.  He takes the Keppra due to the history of the stroke.       KATHARINE (obstructive sleep apnea)  Continue wearing CPAP machine       Chronic obstructive pulmonary disease, unspecified COPD type   continue following up with pulmonology         Weight loss  Declines any protein shakes.  Orders:    CBC Auto Differential    Vitamin B12 & Folate    Comprehensive Metabolic Panel    Cigarette nicotine dependence with nicotine-induced disorder      Declines to quit smoking but states he actually has decreased smoking       Cerebrovascular accident (CVA), unspecified mechanism            Diagnosis Plan   1. Elevated hemoglobin  CBC Auto Differential    Vitamin B12 & Folate    Comprehensive Metabolic Panel      2. History of stroke        3. KATHARINE (obstructive sleep apnea)        4. Chronic obstructive pulmonary disease, unspecified COPD type        5. Weight loss  CBC Auto Differential    Vitamin B12 & Folate    Comprehensive Metabolic Panel      6. Cigarette nicotine dependence with nicotine-induced disorder        7. Cerebrovascular accident (CVA), unspecified mechanism              Assessment & Plan  1. Leg swelling.  The left leg exhibits mild edema, while the right leg remains unaffected. The overall condition of  the legs is satisfactory. He is advised to continue his current regimen, including the use of Lasix, which he should take upon returning home. He is also instructed to apply less dressing to the wound to prevent breakdown of healthy tissue.    2. Weight loss.  He reports not eating much and feeling unwell. His last hemoglobin was 18, and liver functions were slightly off. Blood work will be conducted today to assess kidney and liver function, as well as red blood cell count, to determine if these factors are contributing to his fatigue and general malaise.    3. Pulmonary emphysema.  He reports using his CPAP machine every night and his nebulizer at least twice a day. He has reduced his smoking slightly. He is currently using Breztri inhaler, which has been effective. He is advised to continue his current respiratory treatments.    4. Gallstones.  He has been informed of the presence of 2 small gallstones. No immediate intervention is planned, but monitoring will continue.    5. Hiatal hernia.  A small hiatal hernia was noted. No immediate intervention is planned, but monitoring will continue.      Follow-up  The patient will follow up in September 2025.          Follow Up     Return in about 6 months (around 9/17/2025) for Medicare Wellness.  He refuses to check cholesterol today.  He does see cardiology this week and pulmonology was last week  Patient was given instructions and counseling regarding his condition or for health maintenance advice. Please see specific information pulled into the AVS if appropriate.     Parts of this note are electronic transcriptions/translations of spoken language to printed text using the Dragon Dictation system.          Lee Ann Wilhelm, EVIN  03/17/2025

## 2025-03-18 ENCOUNTER — RESULTS FOLLOW-UP (OUTPATIENT)
Dept: FAMILY MEDICINE CLINIC | Facility: CLINIC | Age: 70
End: 2025-03-18
Payer: MEDICARE

## 2025-03-20 ENCOUNTER — OFFICE VISIT (OUTPATIENT)
Dept: CARDIOLOGY | Facility: CLINIC | Age: 70
End: 2025-03-20
Payer: MEDICARE

## 2025-03-20 VITALS
DIASTOLIC BLOOD PRESSURE: 61 MMHG | HEIGHT: 70 IN | SYSTOLIC BLOOD PRESSURE: 91 MMHG | BODY MASS INDEX: 26.92 KG/M2 | WEIGHT: 188 LBS | HEART RATE: 97 BPM

## 2025-03-20 DIAGNOSIS — E78.5 HYPERLIPIDEMIA LDL GOAL <70: ICD-10-CM

## 2025-03-20 DIAGNOSIS — I10 ESSENTIAL HYPERTENSION: ICD-10-CM

## 2025-03-20 DIAGNOSIS — I48.20 ATRIAL FIBRILLATION, CHRONIC: ICD-10-CM

## 2025-03-20 DIAGNOSIS — I50.32 CHRONIC HEART FAILURE WITH PRESERVED EJECTION FRACTION (HFPEF): Primary | ICD-10-CM

## 2025-03-20 NOTE — ASSESSMENT & PLAN NOTE
Blood pressure trending on the lower side we will discontinue his lisinopril for now and continue with his Maxide 30 simplifies 25 daily dosing

## 2025-03-20 NOTE — ASSESSMENT & PLAN NOTE
Patient remained stable from a volume standpoint both on exam as well as by weight we will continue with his Lasix at 40 mg once a day encourage daily weight logs

## 2025-03-20 NOTE — PROGRESS NOTES
Chief Complaint  6 month follow up, Atrial Fibrillation, Hypertension, Chronic heart failure with preserved ejection fraction, and Hyperlipidemia    Subjective    Patient symptomatically stable no change in his breathing ability weight is down about a pound from last visit.  He does continue to smoke.  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    Seizures          Current Outpatient Medications:     albuterol (ACCUNEB) 1.25 MG/3ML nebulizer solution, Take 3 mL by nebulization Every 6 (Six) Hours As Needed for Wheezing., Disp: 360 each, Rfl: 3    albuterol sulfate  (90 Base) MCG/ACT inhaler, Inhale 2 puffs Every 4 (Four) Hours As Needed for Wheezing., Disp: 54 g, Rfl: 3    apixaban (Eliquis) 5 MG tablet tablet, Take 1 tablet by mouth 2 (Two) Times a Day., Disp: 180 tablet, Rfl: 3    Aspirin Adult Low Strength 81 MG EC tablet, TAKE ONE TABLET BY MOUTH ONCE DAILY, Disp: 90 tablet, Rfl: 3    atorvastatin (LIPITOR) 20 MG tablet, TAKE 1 TABLET DAILY, Disp: 90 tablet, Rfl: 3    Budeson-Glycopyrrol-Formoterol (Breztri Aerosphere) 160-9-4.8 MCG/ACT aerosol inhaler, Inhale 2 puffs 2 (Two) Times a Day. (Patient taking differently: Inhale 2 puffs 2 (Two) Times a Day. Not started yet.), Disp: 3 each, Rfl: 4    furosemide (LASIX) 40 MG tablet, Take 1 tablet by mouth Daily., Disp: 90 tablet, Rfl: 3    levETIRAcetam (KEPPRA) 500 MG tablet, TAKE 2 TABLETS TWICE A DAY, Disp: 360 tablet, Rfl: 3    metoprolol succinate XL (TOPROL-XL) 200 MG 24 hr tablet, TAKE ONE TABLET BY  "MOUTH DAILY, Disp: 90 tablet, Rfl: 3    omega-3 acid ethyl esters (LOVAZA) 1 g capsule, TAKE ONE CAPSULE BY MOUTH TWICE DAILY, Disp: 180 capsule, Rfl: 3    potassium chloride 10 MEQ CR tablet, TAKE TWO TABLETS BY MOUTH DAILY, Disp: 180 tablet, Rfl: 3    silver sulfadiazine (Silvadene) 1 % cream, Apply 1 Application topically to the appropriate area as directed 2 (Two) Times a Day., Disp: 50 g, Rfl: 0    triamterene-hydrochlorothiazide (MAXZIDE-25) 37.5-25 MG per tablet, , Disp: , Rfl:     Medications Discontinued During This Encounter   Medication Reason    lisinopril (PRINIVIL,ZESTRIL) 5 MG tablet     apixaban (Eliquis) 5 MG tablet tablet Reorder     No Known Allergies     Social History     Tobacco Use    Smoking status: Every Day     Current packs/day: 1.50     Average packs/day: 1.5 packs/day for 51.2 years (76.8 ttl pk-yrs)     Types: Cigarettes     Start date: 1974     Passive exposure: Current    Smokeless tobacco: Never    Tobacco comments:     1-1.5 PPD as of 7/5/2023 - [place]    Vaping Use    Vaping status: Never Used   Substance Use Topics    Alcohol use: Yes     Alcohol/week: 1.0 standard drink of alcohol     Types: 1 Cans of beer per week     Comment: some days    Drug use: Never       Family History   Problem Relation Age of Onset    Mental illness Mother     Heart disease Mother     Other Father         parkinson    Colon cancer Neg Hx         Objective     BP 91/61   Pulse 97   Ht 177.8 cm (70\")   Wt 85.3 kg (188 lb)   BMI 26.98 kg/m²       Physical Exam    General Appearance:   no acute distress  Alert and oriented x3  HENT:   lips not cyanotic  Atraumatic  Neck:  No jvd   supple  Respiratory:  no respiratory distress  This breath sounds bilaterally  no rales  Cardiovascular:  Irregular irregular  no S3, no S4   no murmur  no rub  Extremities  No cyanosis  lower extremity edema: none    Skin:   warm, dry  No rashes      Result Review :     proBNP   Date Value Ref Range Status   10/14/2024 10,569.0 " "(H) 0.0 - 900.0 pg/mL Final     CMP          10/14/2024    08:40 10/24/2024    07:34 3/17/2025    07:47   CMP   Glucose 88  108  111    BUN 39  24  20    Creatinine 1.59  1.16  1.08    EGFR 47.0  68.6  74.3    Sodium 133  134  136    Potassium 4.3  3.5  4.3    Chloride 86  88  97    Calcium 10.1  9.6  8.9    Total Protein  8.4  6.7    Albumin  3.9  3.3    Globulin  4.5  3.4    Total Bilirubin  1.1  0.6    Alkaline Phosphatase  199  226    AST (SGOT)  45  31    ALT (SGPT)  44  37    Albumin/Globulin Ratio  0.9  1.0    BUN/Creatinine Ratio 24.5  20.7  18.5    Anion Gap 16.4  20.3  10.0      CBC w/diff          3/28/2024    07:57 10/3/2024    08:42 3/17/2025    07:47   CBC w/Diff   WBC 7.31  6.71  7.46    RBC 5.49  5.56  5.34    Hemoglobin 17.8  18.5  17.8    Hematocrit 51.3  53.0  51.2    MCV 93.4  95.3  95.9    MCH 32.4  33.3  33.3    MCHC 34.7  34.9  34.8    RDW 12.2  12.0  11.7    Platelets 194  146  137    Neutrophil Rel % 73.2  71.0  78.2    Immature Granulocyte Rel % 0.3  0.1  0.4    Lymphocyte Rel % 18.1  21.2  15.5    Monocyte Rel % 5.7  5.7  4.3    Eosinophil Rel % 1.9  1.3  1.1    Basophil Rel % 0.8  0.7  0.5       Lab Results   Component Value Date    TSH 3.140 10/03/2024      No results found for: \"FREET4\"   No results found for: \"DDIMERQUANT\"  No results found for: \"MG\"   No results found for: \"DIGOXIN\"   No results found for: \"TROPONINT\"        Lipid Panel          3/28/2024    07:57 10/3/2024    08:42   Lipid Panel   Total Cholesterol 111  79    Triglycerides 52  57    HDL Cholesterol 42  35    VLDL Cholesterol 12  14    LDL Cholesterol  57  30    LDL/HDL Ratio 1.40  0.93      No results found for: \"POCTROP\"    Results for orders placed in visit on 03/21/22    Adult Transthoracic Echo Complete W/ Cont if Necessary Per Protocol    Interpretation Summary  · Calculated left ventricular EF = 63% Estimated left ventricular EF was in agreement with the calculated left ventricular EF.  · The right ventricular " cavity is moderately dilated.  · The right atrial cavity is moderately dilated.  · D-shaped compression of the interventricular septum consistent with RV pressure volume overload                 Diagnoses and all orders for this visit:    1. Chronic heart failure with preserved ejection fraction (Primary)  Assessment & Plan:  Patient remained stable from a volume standpoint both on exam as well as by weight we will continue with his Lasix at 40 mg once a day encourage daily weight logs      2. Atrial fibrillation, chronic  Assessment & Plan:  Patient is rate controlled continue Eliquis 5 twice daily for CVA prevention    Orders:  -     apixaban (Eliquis) 5 MG tablet tablet; Take 1 tablet by mouth 2 (Two) Times a Day.  Dispense: 180 tablet; Refill: 3    3. Hyperlipidemia LDL goal <70    4. Essential hypertension  Assessment & Plan:  Blood pressure trending on the lower side we will discontinue his lisinopril for now and continue with his Maxide 30 simplifies 25 daily dosing               Follow Up     Return in about 6 months (around 9/20/2025) for Follow with Lilly Jefferson.          Patient was given instructions and counseling regarding his condition or for health maintenance advice. Please see specific information pulled into the AVS if appropriate.

## 2025-03-21 DIAGNOSIS — I63.9 CEREBROVASCULAR ACCIDENT (CVA), UNSPECIFIED MECHANISM: ICD-10-CM

## 2025-03-21 RX ORDER — ASPIRIN 81 MG/1
81 TABLET, COATED ORAL DAILY
Qty: 90 TABLET | Refills: 3 | Status: SHIPPED | OUTPATIENT
Start: 2025-03-21

## 2025-03-21 RX ORDER — OMEGA-3-ACID ETHYL ESTERS 1 G/1
1 CAPSULE, LIQUID FILLED ORAL EVERY 12 HOURS SCHEDULED
Qty: 180 CAPSULE | Refills: 3 | Status: SHIPPED | OUTPATIENT
Start: 2025-03-21

## 2025-03-24 RX ORDER — LEVETIRACETAM 500 MG/1
1000 TABLET ORAL 2 TIMES DAILY
Qty: 360 TABLET | Refills: 1 | OUTPATIENT
Start: 2025-03-24

## 2025-03-28 ENCOUNTER — PATIENT ROUNDING (BHMG ONLY) (OUTPATIENT)
Dept: FAMILY MEDICINE CLINIC | Facility: CLINIC | Age: 70
End: 2025-03-28
Payer: MEDICARE

## 2025-05-06 ENCOUNTER — TELEPHONE (OUTPATIENT)
Dept: FAMILY MEDICINE CLINIC | Facility: CLINIC | Age: 70
End: 2025-05-06

## 2025-05-06 NOTE — TELEPHONE ENCOUNTER
“Please be informed that patient has passed. Patient has been marked  in the system. The date of death is: 26    Caller: JAMSHID GALLARDO    Relationship: Emergency Contact    Best call back number:[phone]     Did the patient have surgery within 30 days of their passing (Y/N): *